# Patient Record
Sex: MALE | Race: WHITE | NOT HISPANIC OR LATINO | Employment: FULL TIME | URBAN - NONMETROPOLITAN AREA
[De-identification: names, ages, dates, MRNs, and addresses within clinical notes are randomized per-mention and may not be internally consistent; named-entity substitution may affect disease eponyms.]

---

## 2020-07-07 RX ORDER — TORSEMIDE 10 MG/1
TABLET ORAL
COMMUNITY
Start: 2019-01-24 | End: 2022-05-06 | Stop reason: SDUPTHER

## 2020-07-07 RX ORDER — ROSUVASTATIN CALCIUM 40 MG/1
TABLET, COATED ORAL
COMMUNITY
Start: 2019-04-10

## 2020-07-07 RX ORDER — ICOSAPENT ETHYL 1000 MG/1
2 CAPSULE ORAL 2 TIMES DAILY
COMMUNITY
End: 2020-08-12 | Stop reason: CLARIF

## 2020-07-07 RX ORDER — UBIDECARENONE 100 MG
100 CAPSULE ORAL DAILY
COMMUNITY

## 2020-07-07 RX ORDER — EZETIMIBE 10 MG/1
10 TABLET ORAL
COMMUNITY

## 2020-07-07 RX ORDER — ALLOPURINOL 300 MG/1
TABLET ORAL
COMMUNITY
Start: 2019-08-14

## 2020-07-07 RX ORDER — LANSOPRAZOLE 30 MG/1
CAPSULE, DELAYED RELEASE ORAL
COMMUNITY
Start: 2019-09-03

## 2020-07-07 RX ORDER — ASPIRIN 81 MG/1
81 TABLET ORAL
COMMUNITY
End: 2022-07-20

## 2020-07-07 RX ORDER — METOPROLOL TARTRATE 100 MG/1
TABLET ORAL
COMMUNITY
Start: 2019-09-06

## 2020-07-07 RX ORDER — AMLODIPINE BESYLATE 2.5 MG/1
TABLET ORAL
COMMUNITY
Start: 2019-09-04 | End: 2021-02-12 | Stop reason: DRUGHIGH

## 2020-07-07 RX ORDER — FLUTICASONE PROPIONATE 50 MCG
2 SPRAY, SUSPENSION (ML) NASAL EVERY 12 HOURS PRN
COMMUNITY

## 2020-07-07 RX ORDER — BENAZEPRIL HYDROCHLORIDE 20 MG/1
TABLET ORAL
COMMUNITY
Start: 2019-09-17 | End: 2020-07-10 | Stop reason: HOSPADM

## 2020-07-07 RX ORDER — FENOFIBRATE 160 MG/1
TABLET ORAL
COMMUNITY
Start: 2019-06-26 | End: 2020-07-10 | Stop reason: HOSPADM

## 2020-07-09 ENCOUNTER — HOSPITAL ENCOUNTER (INPATIENT)
Facility: HOSPITAL | Age: 65
LOS: 1 days | Discharge: HOME | DRG: 916 | End: 2020-07-10
Attending: EMERGENCY MEDICINE | Admitting: STUDENT IN AN ORGANIZED HEALTH CARE EDUCATION/TRAINING PROGRAM
Payer: COMMERCIAL

## 2020-07-09 DIAGNOSIS — E87.6 HYPOKALEMIA: ICD-10-CM

## 2020-07-09 DIAGNOSIS — N18.30 CHRONIC KIDNEY DISEASE (CKD), STAGE III (MODERATE) (HCC): ICD-10-CM

## 2020-07-09 DIAGNOSIS — T78.3XXA ANGIOEDEMA, INITIAL ENCOUNTER: Primary | ICD-10-CM

## 2020-07-09 DIAGNOSIS — I10 ESSENTIAL HYPERTENSION: ICD-10-CM

## 2020-07-09 DIAGNOSIS — E83.42 HYPOMAGNESEMIA: ICD-10-CM

## 2020-07-09 LAB
ANION GAP SERPL CALCULATED.3IONS-SCNC: 12 MMOL/L (ref 4–13)
BASOPHILS # BLD AUTO: 0.06 THOUSANDS/ΜL (ref 0–0.1)
BASOPHILS NFR BLD AUTO: 0 % (ref 0–1)
BUN SERPL-MCNC: 26 MG/DL (ref 5–25)
CALCIUM SERPL-MCNC: 7.4 MG/DL (ref 8.3–10.1)
CHLORIDE SERPL-SCNC: 99 MMOL/L (ref 100–108)
CO2 SERPL-SCNC: 26 MMOL/L (ref 21–32)
CREAT SERPL-MCNC: 2.88 MG/DL (ref 0.6–1.3)
EOSINOPHIL # BLD AUTO: 0.43 THOUSAND/ΜL (ref 0–0.61)
EOSINOPHIL NFR BLD AUTO: 3 % (ref 0–6)
ERYTHROCYTE [DISTWIDTH] IN BLOOD BY AUTOMATED COUNT: 13.9 % (ref 11.6–15.1)
GFR SERPL CREATININE-BSD FRML MDRD: 22 ML/MIN/1.73SQ M
GLUCOSE SERPL-MCNC: 219 MG/DL (ref 65–140)
HCT VFR BLD AUTO: 35.5 % (ref 36.5–49.3)
HGB BLD-MCNC: 12.2 G/DL (ref 12–17)
IMM GRANULOCYTES # BLD AUTO: 0.06 THOUSAND/UL (ref 0–0.2)
IMM GRANULOCYTES NFR BLD AUTO: 0 % (ref 0–2)
LYMPHOCYTES # BLD AUTO: 2.99 THOUSANDS/ΜL (ref 0.6–4.47)
LYMPHOCYTES NFR BLD AUTO: 22 % (ref 14–44)
MCH RBC QN AUTO: 31.2 PG (ref 26.8–34.3)
MCHC RBC AUTO-ENTMCNC: 34.4 G/DL (ref 31.4–37.4)
MCV RBC AUTO: 91 FL (ref 82–98)
MONOCYTES # BLD AUTO: 0.74 THOUSAND/ΜL (ref 0.17–1.22)
MONOCYTES NFR BLD AUTO: 5 % (ref 4–12)
NEUTROPHILS # BLD AUTO: 9.57 THOUSANDS/ΜL (ref 1.85–7.62)
NEUTS SEG NFR BLD AUTO: 70 % (ref 43–75)
NRBC BLD AUTO-RTO: 0 /100 WBCS
PLATELET # BLD AUTO: 366 THOUSANDS/UL (ref 149–390)
PMV BLD AUTO: 9.8 FL (ref 8.9–12.7)
POTASSIUM SERPL-SCNC: 2.9 MMOL/L (ref 3.5–5.3)
RBC # BLD AUTO: 3.91 MILLION/UL (ref 3.88–5.62)
SODIUM SERPL-SCNC: 137 MMOL/L (ref 136–145)
TROPONIN I SERPL-MCNC: <0.02 NG/ML
WBC # BLD AUTO: 13.85 THOUSAND/UL (ref 4.31–10.16)

## 2020-07-09 PROCEDURE — 93005 ELECTROCARDIOGRAM TRACING: CPT

## 2020-07-09 PROCEDURE — 85025 COMPLETE CBC W/AUTO DIFF WBC: CPT | Performed by: EMERGENCY MEDICINE

## 2020-07-09 PROCEDURE — 96365 THER/PROPH/DIAG IV INF INIT: CPT

## 2020-07-09 PROCEDURE — 84484 ASSAY OF TROPONIN QUANT: CPT | Performed by: EMERGENCY MEDICINE

## 2020-07-09 PROCEDURE — 99285 EMERGENCY DEPT VISIT HI MDM: CPT | Performed by: EMERGENCY MEDICINE

## 2020-07-09 PROCEDURE — 36415 COLL VENOUS BLD VENIPUNCTURE: CPT | Performed by: EMERGENCY MEDICINE

## 2020-07-09 PROCEDURE — 80048 BASIC METABOLIC PNL TOTAL CA: CPT | Performed by: EMERGENCY MEDICINE

## 2020-07-09 PROCEDURE — 83735 ASSAY OF MAGNESIUM: CPT | Performed by: PHYSICIAN ASSISTANT

## 2020-07-09 PROCEDURE — 96375 TX/PRO/DX INJ NEW DRUG ADDON: CPT

## 2020-07-09 PROCEDURE — 99285 EMERGENCY DEPT VISIT HI MDM: CPT

## 2020-07-09 RX ORDER — DEXAMETHASONE SODIUM PHOSPHATE 10 MG/ML
10 INJECTION, SOLUTION INTRAMUSCULAR; INTRAVENOUS ONCE
Status: COMPLETED | OUTPATIENT
Start: 2020-07-09 | End: 2020-07-09

## 2020-07-09 RX ORDER — HEPARIN SODIUM 5000 [USP'U]/ML
5000 INJECTION, SOLUTION INTRAVENOUS; SUBCUTANEOUS EVERY 8 HOURS SCHEDULED
Status: DISCONTINUED | OUTPATIENT
Start: 2020-07-09 | End: 2020-07-10 | Stop reason: HOSPADM

## 2020-07-09 RX ORDER — POTASSIUM CHLORIDE 14.9 MG/ML
20 INJECTION INTRAVENOUS
Status: COMPLETED | OUTPATIENT
Start: 2020-07-10 | End: 2020-07-10

## 2020-07-09 RX ORDER — POTASSIUM CHLORIDE 29.8 MG/ML
40 INJECTION INTRAVENOUS ONCE
Status: DISCONTINUED | OUTPATIENT
Start: 2020-07-09 | End: 2020-07-09 | Stop reason: RX

## 2020-07-09 RX ORDER — SODIUM CHLORIDE, SODIUM GLUCONATE, SODIUM ACETATE, POTASSIUM CHLORIDE, MAGNESIUM CHLORIDE, SODIUM PHOSPHATE, DIBASIC, AND POTASSIUM PHOSPHATE .53; .5; .37; .037; .03; .012; .00082 G/100ML; G/100ML; G/100ML; G/100ML; G/100ML; G/100ML; G/100ML
75 INJECTION, SOLUTION INTRAVENOUS CONTINUOUS
Status: DISCONTINUED | OUTPATIENT
Start: 2020-07-10 | End: 2020-07-10

## 2020-07-09 RX ADMIN — SODIUM CHLORIDE, SODIUM LACTATE, POTASSIUM CHLORIDE, AND CALCIUM CHLORIDE 1000 ML: .6; .31; .03; .02 INJECTION, SOLUTION INTRAVENOUS at 22:19

## 2020-07-09 RX ADMIN — DEXAMETHASONE SODIUM PHOSPHATE 10 MG: 10 INJECTION, SOLUTION INTRAMUSCULAR; INTRAVENOUS at 22:19

## 2020-07-09 RX ADMIN — FAMOTIDINE 40 MG: 10 INJECTION, SOLUTION INTRAVENOUS at 22:19

## 2020-07-10 ENCOUNTER — APPOINTMENT (INPATIENT)
Dept: RADIOLOGY | Facility: HOSPITAL | Age: 65
DRG: 916 | End: 2020-07-10
Payer: COMMERCIAL

## 2020-07-10 VITALS
SYSTOLIC BLOOD PRESSURE: 91 MMHG | DIASTOLIC BLOOD PRESSURE: 53 MMHG | HEART RATE: 69 BPM | WEIGHT: 256.39 LBS | OXYGEN SATURATION: 96 % | HEIGHT: 75 IN | TEMPERATURE: 97.8 F | BODY MASS INDEX: 31.88 KG/M2 | RESPIRATION RATE: 20 BRPM

## 2020-07-10 PROBLEM — K20.90 ESOPHAGITIS: Status: ACTIVE | Noted: 2020-07-10

## 2020-07-10 PROBLEM — R73.9 HYPERGLYCEMIA: Status: ACTIVE | Noted: 2020-07-10

## 2020-07-10 PROBLEM — I25.2 H/O ACUTE MYOCARDIAL INFARCTION: Status: ACTIVE | Noted: 2020-07-10

## 2020-07-10 PROBLEM — T78.3XXA ANGIO-EDEMA, INITIAL ENCOUNTER: Status: ACTIVE | Noted: 2020-07-10

## 2020-07-10 PROBLEM — N18.30 CHRONIC KIDNEY DISEASE (CKD), STAGE III (MODERATE) (HCC): Status: ACTIVE | Noted: 2020-07-10

## 2020-07-10 PROBLEM — E78.5 HYPERLIPIDEMIA: Status: ACTIVE | Noted: 2020-07-10

## 2020-07-10 PROBLEM — R63.4 WEIGHT LOSS, NON-INTENTIONAL: Status: ACTIVE | Noted: 2020-07-10

## 2020-07-10 PROBLEM — N17.9 ACUTE KIDNEY INJURY (HCC): Status: ACTIVE | Noted: 2020-07-10

## 2020-07-10 PROBLEM — I10 HTN (HYPERTENSION): Status: ACTIVE | Noted: 2020-07-10

## 2020-07-10 PROBLEM — I25.10 CAD (CORONARY ARTERY DISEASE): Status: ACTIVE | Noted: 2020-07-10

## 2020-07-10 PROBLEM — G47.30 SLEEP APNEA: Status: ACTIVE | Noted: 2020-07-10

## 2020-07-10 PROBLEM — E83.42 HYPOMAGNESEMIA: Status: ACTIVE | Noted: 2020-07-10

## 2020-07-10 PROBLEM — E87.6 HYPOKALEMIA: Status: ACTIVE | Noted: 2020-07-10

## 2020-07-10 LAB
ALBUMIN SERPL BCP-MCNC: 2.9 G/DL (ref 3.5–5)
ALP SERPL-CCNC: 104 U/L (ref 46–116)
ALT SERPL W P-5'-P-CCNC: 45 U/L (ref 12–78)
ANION GAP SERPL CALCULATED.3IONS-SCNC: 12 MMOL/L (ref 4–13)
ANION GAP SERPL CALCULATED.3IONS-SCNC: 8 MMOL/L (ref 4–13)
AST SERPL W P-5'-P-CCNC: 56 U/L (ref 5–45)
BASOPHILS # BLD AUTO: 0.02 THOUSANDS/ΜL (ref 0–0.1)
BASOPHILS NFR BLD AUTO: 0 % (ref 0–1)
BILIRUB DIRECT SERPL-MCNC: 0.2 MG/DL (ref 0–0.2)
BILIRUB SERPL-MCNC: 0.38 MG/DL (ref 0.2–1)
BUN SERPL-MCNC: 21 MG/DL (ref 5–25)
BUN SERPL-MCNC: 22 MG/DL (ref 5–25)
CALCIUM SERPL-MCNC: 8 MG/DL (ref 8.3–10.1)
CALCIUM SERPL-MCNC: 8.3 MG/DL (ref 8.3–10.1)
CHLORIDE SERPL-SCNC: 102 MMOL/L (ref 100–108)
CHLORIDE SERPL-SCNC: 102 MMOL/L (ref 100–108)
CO2 SERPL-SCNC: 24 MMOL/L (ref 21–32)
CO2 SERPL-SCNC: 26 MMOL/L (ref 21–32)
CREAT SERPL-MCNC: 2 MG/DL (ref 0.6–1.3)
CREAT SERPL-MCNC: 2.33 MG/DL (ref 0.6–1.3)
EOSINOPHIL # BLD AUTO: 0 THOUSAND/ΜL (ref 0–0.61)
EOSINOPHIL NFR BLD AUTO: 0 % (ref 0–6)
ERYTHROCYTE [DISTWIDTH] IN BLOOD BY AUTOMATED COUNT: 13.5 % (ref 11.6–15.1)
GFR SERPL CREATININE-BSD FRML MDRD: 29 ML/MIN/1.73SQ M
GFR SERPL CREATININE-BSD FRML MDRD: 34 ML/MIN/1.73SQ M
GLUCOSE SERPL-MCNC: 132 MG/DL (ref 65–140)
GLUCOSE SERPL-MCNC: 151 MG/DL (ref 65–140)
GLUCOSE SERPL-MCNC: 153 MG/DL (ref 65–140)
GLUCOSE SERPL-MCNC: 162 MG/DL (ref 65–140)
HCT VFR BLD AUTO: 35.8 % (ref 36.5–49.3)
HGB BLD-MCNC: 12.1 G/DL (ref 12–17)
IMM GRANULOCYTES # BLD AUTO: 0.04 THOUSAND/UL (ref 0–0.2)
IMM GRANULOCYTES NFR BLD AUTO: 1 % (ref 0–2)
LYMPHOCYTES # BLD AUTO: 0.76 THOUSANDS/ΜL (ref 0.6–4.47)
LYMPHOCYTES NFR BLD AUTO: 11 % (ref 14–44)
MAGNESIUM SERPL-MCNC: 0.4 MG/DL (ref 1.6–2.6)
MAGNESIUM SERPL-MCNC: 1.7 MG/DL (ref 1.6–2.6)
MAGNESIUM SERPL-MCNC: 2.8 MG/DL (ref 1.6–2.6)
MCH RBC QN AUTO: 30.8 PG (ref 26.8–34.3)
MCHC RBC AUTO-ENTMCNC: 33.8 G/DL (ref 31.4–37.4)
MCV RBC AUTO: 91 FL (ref 82–98)
MONOCYTES # BLD AUTO: 0.04 THOUSAND/ΜL (ref 0.17–1.22)
MONOCYTES NFR BLD AUTO: 1 % (ref 4–12)
NEUTROPHILS # BLD AUTO: 6.2 THOUSANDS/ΜL (ref 1.85–7.62)
NEUTS SEG NFR BLD AUTO: 87 % (ref 43–75)
NRBC BLD AUTO-RTO: 0 /100 WBCS
PLATELET # BLD AUTO: 313 THOUSANDS/UL (ref 149–390)
PMV BLD AUTO: 9.9 FL (ref 8.9–12.7)
POTASSIUM SERPL-SCNC: 3.9 MMOL/L (ref 3.5–5.3)
POTASSIUM SERPL-SCNC: 4.1 MMOL/L (ref 3.5–5.3)
PROT SERPL-MCNC: 7.2 G/DL (ref 6.4–8.2)
RBC # BLD AUTO: 3.93 MILLION/UL (ref 3.88–5.62)
SODIUM SERPL-SCNC: 136 MMOL/L (ref 136–145)
SODIUM SERPL-SCNC: 138 MMOL/L (ref 136–145)
WBC # BLD AUTO: 7.06 THOUSAND/UL (ref 4.31–10.16)

## 2020-07-10 PROCEDURE — 80076 HEPATIC FUNCTION PANEL: CPT | Performed by: PHYSICIAN ASSISTANT

## 2020-07-10 PROCEDURE — 99234 HOSP IP/OBS SM DT SF/LOW 45: CPT | Performed by: STUDENT IN AN ORGANIZED HEALTH CARE EDUCATION/TRAINING PROGRAM

## 2020-07-10 PROCEDURE — 83735 ASSAY OF MAGNESIUM: CPT | Performed by: PHYSICIAN ASSISTANT

## 2020-07-10 PROCEDURE — 82948 REAGENT STRIP/BLOOD GLUCOSE: CPT

## 2020-07-10 PROCEDURE — 80048 BASIC METABOLIC PNL TOTAL CA: CPT | Performed by: PHYSICIAN ASSISTANT

## 2020-07-10 PROCEDURE — 71045 X-RAY EXAM CHEST 1 VIEW: CPT

## 2020-07-10 PROCEDURE — 85025 COMPLETE CBC W/AUTO DIFF WBC: CPT | Performed by: PHYSICIAN ASSISTANT

## 2020-07-10 RX ORDER — MAGNESIUM SULFATE HEPTAHYDRATE 40 MG/ML
2 INJECTION, SOLUTION INTRAVENOUS ONCE
Status: COMPLETED | OUTPATIENT
Start: 2020-07-10 | End: 2020-07-10

## 2020-07-10 RX ORDER — PANTOPRAZOLE SODIUM 40 MG/1
40 TABLET, DELAYED RELEASE ORAL
Status: DISCONTINUED | OUTPATIENT
Start: 2020-07-10 | End: 2020-07-10 | Stop reason: HOSPADM

## 2020-07-10 RX ORDER — MAGNESIUM SULFATE HEPTAHYDRATE 40 MG/ML
2 INJECTION, SOLUTION INTRAVENOUS
Status: COMPLETED | OUTPATIENT
Start: 2020-07-10 | End: 2020-07-10

## 2020-07-10 RX ORDER — AMLODIPINE BESYLATE 2.5 MG/1
2.5 TABLET ORAL DAILY
Status: DISCONTINUED | OUTPATIENT
Start: 2020-07-10 | End: 2020-07-10 | Stop reason: HOSPADM

## 2020-07-10 RX ORDER — ATORVASTATIN CALCIUM 40 MG/1
80 TABLET, FILM COATED ORAL
Status: DISCONTINUED | OUTPATIENT
Start: 2020-07-10 | End: 2020-07-10 | Stop reason: HOSPADM

## 2020-07-10 RX ORDER — METOPROLOL TARTRATE 50 MG/1
100 TABLET, FILM COATED ORAL 2 TIMES DAILY
Status: DISCONTINUED | OUTPATIENT
Start: 2020-07-10 | End: 2020-07-10 | Stop reason: HOSPADM

## 2020-07-10 RX ORDER — MAGNESIUM SULFATE HEPTAHYDRATE 40 MG/ML
4 INJECTION, SOLUTION INTRAVENOUS ONCE
Status: DISCONTINUED | OUTPATIENT
Start: 2020-07-10 | End: 2020-07-10 | Stop reason: RX

## 2020-07-10 RX ORDER — TORSEMIDE 10 MG/1
10 TABLET ORAL DAILY
Status: DISCONTINUED | OUTPATIENT
Start: 2020-07-10 | End: 2020-07-10

## 2020-07-10 RX ORDER — NICOTINE 21 MG/24HR
14 PATCH, TRANSDERMAL 24 HOURS TRANSDERMAL DAILY
Status: DISCONTINUED | OUTPATIENT
Start: 2020-07-10 | End: 2020-07-10

## 2020-07-10 RX ORDER — EZETIMIBE 10 MG/1
10 TABLET ORAL
Status: DISCONTINUED | OUTPATIENT
Start: 2020-07-10 | End: 2020-07-10 | Stop reason: HOSPADM

## 2020-07-10 RX ORDER — ALLOPURINOL 100 MG/1
300 TABLET ORAL DAILY
Status: DISCONTINUED | OUTPATIENT
Start: 2020-07-10 | End: 2020-07-10 | Stop reason: HOSPADM

## 2020-07-10 RX ORDER — ASPIRIN 81 MG/1
81 TABLET ORAL DAILY
Status: DISCONTINUED | OUTPATIENT
Start: 2020-07-10 | End: 2020-07-10 | Stop reason: HOSPADM

## 2020-07-10 RX ADMIN — SODIUM CHLORIDE, SODIUM GLUCONATE, SODIUM ACETATE, POTASSIUM CHLORIDE, MAGNESIUM CHLORIDE, SODIUM PHOSPHATE, DIBASIC, AND POTASSIUM PHOSPHATE 75 ML/HR: .53; .5; .37; .037; .03; .012; .00082 INJECTION, SOLUTION INTRAVENOUS at 00:55

## 2020-07-10 RX ADMIN — HEPARIN SODIUM 5000 UNITS: 5000 INJECTION INTRAVENOUS; SUBCUTANEOUS at 05:05

## 2020-07-10 RX ADMIN — ALLOPURINOL 300 MG: 100 TABLET ORAL at 08:34

## 2020-07-10 RX ADMIN — METOPROLOL TARTRATE 100 MG: 50 TABLET, FILM COATED ORAL at 08:34

## 2020-07-10 RX ADMIN — MAGNESIUM SULFATE HEPTAHYDRATE 2 G: 40 INJECTION, SOLUTION INTRAVENOUS at 06:09

## 2020-07-10 RX ADMIN — POTASSIUM CHLORIDE 20 MEQ: 14.9 INJECTION, SOLUTION INTRAVENOUS at 01:03

## 2020-07-10 RX ADMIN — AMLODIPINE BESYLATE 2.5 MG: 2.5 TABLET ORAL at 08:34

## 2020-07-10 RX ADMIN — POTASSIUM CHLORIDE 20 MEQ: 14.9 INJECTION, SOLUTION INTRAVENOUS at 02:39

## 2020-07-10 RX ADMIN — MAGNESIUM SULFATE HEPTAHYDRATE 2 G: 40 INJECTION, SOLUTION INTRAVENOUS at 02:39

## 2020-07-10 RX ADMIN — INSULIN LISPRO 1 UNITS: 100 INJECTION, SOLUTION INTRAVENOUS; SUBCUTANEOUS at 08:50

## 2020-07-10 RX ADMIN — PANTOPRAZOLE SODIUM 40 MG: 40 TABLET, DELAYED RELEASE ORAL at 05:05

## 2020-07-10 RX ADMIN — MAGNESIUM SULFATE HEPTAHYDRATE 2 G: 40 INJECTION, SOLUTION INTRAVENOUS at 00:57

## 2020-07-10 RX ADMIN — INSULIN LISPRO 1 UNITS: 100 INJECTION, SOLUTION INTRAVENOUS; SUBCUTANEOUS at 12:31

## 2020-07-10 NOTE — ASSESSMENT & PLAN NOTE
· LFTs ordered for morning draw  · Continue statin and zetia  · Fenofibrate held with elevated creatinine

## 2020-07-10 NOTE — ASSESSMENT & PLAN NOTE
· Patient denies having DM  · Last HA1C 5 9 with PCP  · Check HA1C with morning labs  · Glucose 219 on admission  · Sliding scale ordered

## 2020-07-10 NOTE — ASSESSMENT & PLAN NOTE
· Resolved since treatment in ED  · Takes Benazepril, as home medication  Discontinue and add to allergy list  · Follow up outpatient with Cardiologist or PCP for blood pressure management

## 2020-07-10 NOTE — ASSESSMENT & PLAN NOTE
· Takes Norvasc, Lopressor and benazepril as home medications for BP control  · Benazepril stopped at this time due to angioedema  ACE inhibitors known to cause angioedema  · Will Consult Cardiology for input on medication change and discontinuation of benazepril

## 2020-07-10 NOTE — H&P
H&P- Brent Peñaloza 1955, 63 y o  male MRN: [id number]    Unit/Bed#: -01 Encounter: [id number]    Primary Care Provider: Oliver Mcintosh DO   Date and time admitted to hospital: 7/9/2020 10:00 PM        Hypomagnesemia  Assessment & Plan  · Magnesium 0 04 on initial labs  · Magnesium 4 g IV ordered stat  · Re-check with AM labs  * Angio-edema, initial encounter  Assessment & Plan  · Improving since treatment in ED  · Takes ARB, Benazepril, as home medication  Will hold at this time  · Placed on Benazepril recently by Cardiologist   Will consult Cardiology for input with medication change  · Monitor airway  Medications as needed if symptoms worsen  Hyperglycemia  Assessment & Plan  · Patient denies having DM  · Last HA1C 5 9 with PCP  · Check HA1C with morning labs  · Glucose 219 on admission  · Sliding scale ordered  Acute kidney injury [facility])  Assessment & Plan  · Creatinine 1 91 on 07/09/2020 on labs with PCP  · On admission, 2 88     · May be due to dehydration  Fluid resuscitation with Isolyte 75 mL/hr for 4 hours  · Encourage oral intake of fluids  · Re-check with AM labs  · Hold diuretic for now and avoid nephrotoxic agents  · Consider Nephrology consult  Hypokalemia  Assessment & Plan  · 2 9 on admission  · Potassium chloride 40 meq IV administered  · Re-check with morning labs  · No EKG changes noted  HTN (hypertension)  Assessment & Plan  · Takes Norvasc, Lopressor and benazepril as home medications for BP control  · Benazepril stopped at this time due to angioedema  ACE inhibitors known to cause angioedema  · Will Consult Cardiology for input on medication change and discontinuation of benazepril  Chronic kidney disease (CKD), stage III (moderate) ([facility])  Assessment & Plan  · 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  · [id number] [facility] one time many years ago     · Baseline creatinine appears to be 1 5-1 8 with review from Beau  · Labs drawn on 7/6, creatinine was 1 9    2 88 on admission  H/O acute myocardial infarction  Assessment & Plan  · MI and heart catheterization in 2004  · No chest pain reported at this time and no EKG changes  · Troponin < 0 02  CAD (coronary artery disease)  Assessment & Plan  · Takes aspirin 81 mg daily  Will continue  · H/o MI and cardiac catheterization in 2004  Esophagitis  Assessment & Plan  · Takes Prevacid at home  · Ordered as Protonix while inpatient  Hyperlipidemia  Assessment & Plan  · LFTs ordered for morning draw  · Continue statin and zetia  · Fenofibrate held with elevated creatinine  Sleep apnea  Assessment & Plan  · Uses CPAP at home  · Encouraged to have wife bring in home unit  · Does not want CPAP on tonight  If he becomes hypoxic while sleeping, will order tonight  Weight loss, non-intentional  Assessment & Plan  · Patient reported during exam of losing almost 20 lbs  Has gone from 271 lbs to his admission weight of 255 in less than a month  · Patient did state that he is eating less due to heat  Doesn't feel like eating  · He is encouraged to monitor weight and follow up with PCP for possible workup if weight loss continues  -------------------------------------------------------------------------------------------------------------  Chief Complaint: tongue and facial swelling  History of Present Illness   HX and PE limited by: None    Yessy Valladares is a pleasant 63 y o  male who presents with swelling of the face and tongue  It began on the evening of 7/9 after he ate dinner  He immediately called EMS  He has not had this issue before  Treatment in the ED with Benadryl, steroids and epi helped  Patient states facial swelling has gone away and his tongue feels mildly swollen  He is having no trouble breathing at this time       Of note, initial labs show hypokalemia, 2 9, and hypomagnesemia, 0 4   Repletion electrolytes ordered  He endorses un-intentional weight loss, from 271 to 255  He stated that he has not been eating much due to the heat and has not been drinking enough fluids  He denies SOB, headache, abdominal pain, chest pain, nausea, vomiting, diarrhea and fevers  It was noted that he takes Benazepril  Will discontinue for now and consult Cardiology for medication change  History obtained from chart review and the patient   -------------------------------------------------------------------------------------------------------------  Dispo: Admit to Stepdown Level 2    Code Status: Level 1 - Full Code  --------------------------------------------------------------------------------------------------------------  Review of Systems    A 12-point, complete review of systems was reviewed and negative except as stated above     Physical Exam   Constitutional: He is oriented to person, place, and time  He appears well-developed and well-nourished  No distress  HENT:   Head: Normocephalic and atraumatic  Right Ear: External ear normal    Left Ear: External ear normal    Nose: Nose normal    Mouth/Throat: Uvula is midline, oropharynx is clear and moist and mucous membranes are normal  No tonsillar exudate  Tongue noted to have mild edema on left side  Eyes: Pupils are equal, round, and reactive to light  Conjunctivae and EOM are normal  Right eye exhibits no discharge  Left eye exhibits no discharge  No scleral icterus  Neck: Normal range of motion  Neck supple  No JVD present  No tracheal deviation present  Cardiovascular: Normal rate, regular rhythm and intact distal pulses  Exam reveals no gallop and no friction rub  Pulmonary/Chest: Effort normal and breath sounds normal  No stridor  No respiratory distress  He has no wheezes  He has no rales  He exhibits no tenderness  Abdominal: Soft  Bowel sounds are normal  He exhibits no distension and no mass   There is no tenderness  There is no rebound and no guarding  Musculoskeletal: Normal range of motion  He exhibits no edema, tenderness or deformity  Neurological: He is alert and oriented to person, place, and time  No cranial nerve deficit or sensory deficit  He exhibits normal muscle tone  Coordination normal    Skin: Skin is warm and dry  Capillary refill takes less than 2 seconds  No rash noted  He is not diaphoretic  No erythema  No pallor  --------------------------------------------------------------------------------------------------------------  Vitals:   Vitals:    07/09/20 2245 07/09/20 2300 07/09/20 2330 07/10/20 0000   BP: 134/60 141/65 133/63 126/67   BP Location: Right arm Right arm Right arm Right arm   Pulse: 83 83 68 65   Resp: (!) 24 (!) 31 22 18   Temp:    98 3 °F (36 8 °C)   TempSrc:    Oral   SpO2: 94% 93% 93% 97%   Weight:    116 kg (256 lb 6 3 oz)   Height:    6' 3" (1 905 m)     Temp  Min: 98 3 °F (36 8 °C)  Max: 98 3 °F (36 8 °C)  IBW: 84 5 kg  Height: 6' 3" (190 5 cm)  Body mass index is 32 05 kg/m²  Laboratory and Diagnostics:  Results from last 7 days   Lab Units 07/09/20  2214   WBC Thousand/uL 13 85*   HEMOGLOBIN g/dL 12 2   HEMATOCRIT % 35 5*   PLATELETS Thousands/uL 366   NEUTROS PCT % 70   MONOS PCT % 5     Results from last 7 days   Lab Units 07/09/20  2214   SODIUM mmol/L 137   POTASSIUM mmol/L 2 9*   CHLORIDE mmol/L 99*   CO2 mmol/L 26   ANION GAP mmol/L 12   BUN mg/dL 26*   CREATININE mg/dL 2 88*   CALCIUM mg/dL 7 4*   GLUCOSE RANDOM mg/dL 219*     Results from last 7 days   Lab Units 07/09/20  2214   MAGNESIUM mg/dL 0 4*           Results from last 7 days   Lab Units 07/09/20  2214   TROPONIN I ng/mL <0 02         ABG:    VBG:          Micro:        EKG: NSR on telemetry  Imaging: There were no images to review         Historical Information   Past Medical History:   Diagnosis Date    CAD (coronary artery disease)     Chronic kidney disease     [facility]     Coronary artery disease     Esophagitis     H/O heart artery stent     Hyperlipidemia     Hypertension     [facility] infarction) [facility]     LUCIO (obstructive sleep apnea)     Sleep apnea      Past Surgical History:   Procedure Laterality Date    CARDIAC CATHETERIZATION      CARDIAC SURGERY      HERNIA REPAIR      ROTATOR CUFF REPAIR       Social History   Social History     Substance and Sexual Activity   Alcohol Use Yes    Frequency: Monthly or less     Social History     Substance and Sexual Activity   Drug Use Never     Social History     Tobacco Use   Smoking Status Former Smoker    Packs/day: 0 50    Types: Cigarettes   Smokeless Tobacco Never Used     Exercise History: Performs independent ADLs  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     Family History:   Family History   Problem Relation Age of Onset    Hypertension Mother     Arthritis Mother     Hypertension Father     Hyperlipidemia Father     Hypertension Sister     Hyperlipidemia Sister     Hyperlipidemia Brother     Diabetes Paternal Grandmother      I have reviewed this patient's family history and commented on sigificant items within the HPI      Medications:  Current Facility-Administered Medications   Medication Dose Route Frequency    allopurinol (ZYLOPRIM) tablet 300 mg  300 mg Oral Daily    amLODIPine (NORVASC) tablet 2 5 mg  2 5 mg Oral Daily    aspirin (ECOTRIN LOW STRENGTH) EC tablet 81 mg  81 mg Oral Daily    atorvastatin (LIPITOR) tablet 80 mg  80 mg Oral Daily With Dinner    ezetimibe (ZETIA) tablet 10 mg  10 mg Oral HS    heparin (porcine) subcutaneous injection 5,000 Units  5,000 Units Subcutaneous Q8H [address]    insulin lispro (HumaLOG) 100 units/mL subcutaneous injection 1-5 Units  1-5 Units Subcutaneous TID AC    magnesium sulfate 2 g/50 mL IVPB (premix) 2 g  2 g Intravenous Q2H    metoprolol tartrate (LOPRESSOR) tablet 100 mg  100 mg Oral BID    multi-electrolyte (PLASMALYTE-A/ISOLYTE-S PH 7 4) IV solution  75 mL/hr Intravenous Continuous    nicotine (NICODERM CQ) 14 mg/24hr TD 24 hr patch 14 mg  14 mg Transdermal Daily    pantoprazole (PROTONIX) EC tablet 40 mg  40 mg Oral Early Morning    potassium chloride 20 mEq IVPB (premix)  20 mEq Intravenous Q2H     Home medications:  Prior to Admission Medications   Prescriptions Last Dose Informant Patient Reported? Taking? Icosapent Ethyl 1 g CAPS 2020 at Unknown time  Yes Yes   Sig: Take 2 g by mouth 2 (two) times a day    allopurinol (ZYLOPRIM) 300 mg tablet 2020 at Unknown time  Yes Yes   Sig: TAKE (1) TABLET BY MOUTH ONCE DAILY  amLODIPine (NORVASC) 2 5 mg tablet 2020 at Unknown time  Yes Yes   Sig: TAKE (1) TABLET BY MOUTH DAILY IN THE MORNING   aspirin (ECOTRIN LOW STRENGTH) 81 mg EC tablet 2020 at Unknown time  Yes Yes   Sig: Take 81 mg by mouth daily after dinner Takes at 1800   benazepril (LOTENSIN) 20 mg tablet 2020 at Unknown time  Yes Yes   Sig: TAKE (1) TABLET BY MOUTH ONCE DAILY  co-enzyme Q-10 100 mg capsule 2020 at Unknown time  Yes Yes   Sig: Take 100 mg by mouth daily    ezetimibe (ZETIA) 10 mg tablet 2020  Yes No   Sig: Take 10 mg by mouth daily at bedtime    fenofibrate (TRIGLIDE) 160 MG tablet 2020  Yes No   Sig: daily at bedtime    fluticasone (FLONASE) 50 mcg/act nasal spray More than a month at Unknown time Spouse/Significant Other Yes No   Si sprays into each nostril every 12 (twelve) hours as needed (sinus infections)    lansoprazole (PREVACID) 30 mg capsule 2020 at Unknown time  Yes Yes   Sig: TAKE 1 CAPSULE BY MOUTH ONCE DAILY  metoprolol tartrate (LOPRESSOR) 100 mg tablet 2020 at Unknown time  Yes Yes   Sig: TAKE 1 TABLET BY MOUTH TWICE DAILY  rosuvastatin (CRESTOR) 40 MG tablet 2020 at Unknown time  Yes Yes   Sig: TAKE 1 TABLET BY MOUTH ONCE DAILY IN THE EVENING     torsemide (DEMADEX) 10 mg tablet 2020 at Unknown time  Yes Yes   Sig: TAKE (1) TABLET BY MOUTH ONCE DAILY  Facility-Administered Medications: None     Allergies: Allergies   Allergen Reactions    Alprazolam Other (See Comments)     Other reaction(s): Other: Document details in comments  SEVERE DISOREINTATION/CRAZY  SEVERE DISOREINTATION/CRAZY      Buspirone      Other reaction(s): Other (See Comments), Other: Document details in comments  SEVERE DISORIENTATION/CRAZY  SEVERE DISORIENTATION/CRAZY      Lorazepam      Other reaction(s): Other (See Comments), Other: Document details in comments  SEVERE DISORIENTATION/CRAZY  SEVERE DISORIENTATION/CRAZY         ------------------------------------------------------------------------------------------------------------  Advance Directive and Living Will:      Power of :    POLST:    ------------------------------------------------------------------------------------------------------------  Anticipated Length of Stay is > 2 midnights    Care Time Delivered:   No Critical Care time spent       [facility]        Portions of the record may have been created with voice recognition software  Occasional wrong word or "sound a like" substitutions may have occurred due to the inherent limitations of voice recognition software    Read the chart carefully and recognize, using context, where substitutions have occurred

## 2020-07-10 NOTE — ASSESSMENT & PLAN NOTE
· Takes Norvasc, Lopressor and benazepril as home medications for BP control  · Benazepril stopped at this time due to angioedema  ACE inhibitors known to cause angioedema    · Follow up outpatient for further management

## 2020-07-10 NOTE — ASSESSMENT & PLAN NOTE
· Patient denies having DM  · Last HA1C 5 9 with PCP  · Glucose 219 on admission and sliding scale ordered     · Follow up outpatient with PCP

## 2020-07-10 NOTE — UTILIZATION REVIEW
Notification of Inpatient Admission/Inpatient Authorization Request   This is a Notification of Inpatient Admission for [address]  Be advised that this patient was admitted to our facility under Inpatient Status  Contact Celina Echevarria at [phone] for additional admission information  [facility] DEPT  DEDICATED [phone]  Patient Name:   Ashley Perez   YOB: 1955       [address]   [address]:   [address]  Tax ID: [id number]  NPI: [id number] Attending Provider/NPI:  Phone:  Address: Jesusita Ybarra [[id number]]  [phone]  SAME AS FACILITY   Place of Service Code: 24 Place of Service Name:  [address] [place] [address]   Start Date: 7/9/20 2331     Discharge Date & Time: 7/10/2020  1:15 PM    Type of Admission: Inpatient Status Discharge Disposition (if discharged): Home/Self Care   Patient Diagnoses: Allergic reaction [T78 40XA]  Angioedema, initial encounter [T78  3XXA]     Orders: Admission Orders (From admission, onward)     Ordered        07/09/20 2334  Inpatient Admission  Once         07/09/20 2331  Inpatient Admission  Once                    Assigned Utilization Review Contact: Celina Echevarria  Utilization   Network Utilization Review Department  Phone: [phone]; Fax [phone]  Email: [email or web address]   ATTENTION PAYERS: Please call the assigned Utilization  directly with any questions or concerns ALL voicemails in the department are confidential  Send all requests for admission clinical reviews, approved or denied determinations and any other requests to dedicated fax number belonging to the campus where the patient is receiving treatment

## 2020-07-10 NOTE — ED NOTES
Pt  Re-assessed, no change to tongue swelling, no signs of acute resp distress noted, pt  breathing easy on room air, VSS, will continue to monitor     Bernardo Sánchez RN  07/09/20 9180

## 2020-07-10 NOTE — ASSESSMENT & PLAN NOTE
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 Nephrology one time many years ago  · Baseline creatinine appears to be 1 5-1 8 with review from Care Everywhere  · Labs drawn on 7/6, creatinine was 1 9 then 2 88 on admission     · Follow up outpatient

## 2020-07-10 NOTE — PROGRESS NOTES
Chart reviewed  Admit with angio edema  Possible dc today  CM consulted for dc planning:    CM met with patient at the bedside,baseline information  was obtained  CM discussed the role of CM in helping the patient develop a discharge plan and assist the patient in carry out their plan      07/10/20 [facility]   Patient Information   Mental Status Alert   Primary Caregiver Self   Support System Immediate family   Activities of Daily Living Prior to Admission   Functional Status Independent   Assistive Device No device needed   Living Arrangement House;Lives with someone   [place] of Transport to Appts: Drive Self       Patient has identified:his wife Mandy Salgado as her primary   She is able to assist upon discharge  No POA: Pt verbalized Mandy Salgado would be the person assisting with decisions with making if need be  No advance directive: At this time patient is not interested in receiving information  PCP:  Angel Luis Bonilla  Pt has a prescription plan and uses [employer]  Patient is not a [place]:   Pt denies SA/MH history    House set up: ranch home  Functional level PTA: very I/pta, works full time  DME use: cpap crom CPO2  Prior use of Home Care or STR: none  Transportation: does drive  Community Resources: None    CM reviewed d/c planning process including the following: identifying help at home, patient preference for d/c planning needs, availability of treatment team to discuss questions or concerns patient and/or family may have regarding understanding medications and recognizing signs and symptoms once discharged  CM also encouraged patient to follow up with all recommended appointments after discharge  Patient advised of importance for patient and family to participate in managing patients medical well being      Goal of patient upon discharge is to go home     Discussed PCP follow up and at this time patient does not want me to make the appointment he will as patient is a 14 for Unplanned Readmission- advised patient to call and been seen with in 3 business days  No other needs identified  Plan is home with family

## 2020-07-10 NOTE — ASSESSMENT & PLAN NOTE
· MI and heart catheterization in 2004  · No chest pain reported at this time and no EKG changes  · Troponin < 0 02

## 2020-07-10 NOTE — PLAN OF CARE
Pt admitted with angioedema which has improved significantly with steriods, benadryl, and epi in ED  Face symmetry back to WDL  Left side of tongue with minimal swelling noted  Pt's K replaced and new result pending from this am   Mag replaced and was 1 7 this am   Another 2 gm Mag hung this am   Pt's IV site with burning s/p replacements  No redness or cording noted  IV with + flushing and blood return  Pt stated his IV felt better after flushing  Warm compress also applied to left arm  Morning Mag to be infused over 4 hrs instead of 2 hrs per PA  Pt tolerating protocols  Cardiology consulted  BMP lab results pending  Problem: Potential for Falls  Goal: Patient will remain free of falls  Description  INTERVENTIONS:  - Assess patient frequently for physical needs  -  Identify cognitive and physical deficits and behaviors that affect risk of falls  -  [place] fall precautions as indicated by assessment   - Educate patient/family on patient safety including physical limitations  - Instruct patient to call for assistance with activity based on assessment  - Modify environment to reduce risk of injury  - Consider OT/PT consult to assist with strengthening/mobility  Outcome: Progressing     Problem: Nutrition/Hydration-ADULT  Goal: Nutrient/Hydration intake appropriate for improving, restoring or maintaining nutritional needs  Description  Monitor and assess patient's nutrition/hydration status for malnutrition  Collaborate with interdisciplinary team and initiate plan and interventions as ordered  Monitor patient's weight and dietary intake as ordered or per policy  Utilize nutrition screening tool and intervene as necessary  Determine patient's food preferences and provide high-protein, high-caloric foods as appropriate       INTERVENTIONS:  - Monitor oral intake, urinary output, labs, and treatment plans  - Assess nutrition and hydration status and recommend course of action  - Evaluate amount of meals eaten  - Assist patient with eating if necessary   - Allow adequate time for meals  - Recommend/ encourage appropriate diets, oral nutritional supplements, and vitamin/mineral supplements  - Order, calculate, and assess calorie counts as needed  - Recommend, monitor, and adjust tube feedings and TPN/PPN based on assessed needs  - Assess need for intravenous fluids  - Provide specific nutrition/hydration education as appropriate  - Include patient/family/caregiver in decisions related to nutrition  Outcome: Progressing     Problem: CARDIOVASCULAR - ADULT  Goal: Maintains optimal cardiac output and hemodynamic stability  Description  INTERVENTIONS:  - Monitor I/O, vital signs and rhythm  - Monitor for S/S and trends of decreased cardiac output  - Administer and titrate ordered vasoactive medications to optimize hemodynamic stability  - Assess quality of pulses, skin color and temperature  - Assess for signs of decreased coronary artery perfusion  - Instruct patient to report change in severity of symptoms  Outcome: Progressing  Goal: Absence of cardiac dysrhythmias or at baseline rhythm  Description  INTERVENTIONS:  - Continuous cardiac monitoring, vital signs, obtain 12 lead EKG if ordered  - Administer antiarrhythmic and heart rate control medications as ordered  - Monitor electrolytes and administer replacement therapy as ordered  Outcome: Progressing     Problem: GASTROINTESTINAL - ADULT  Goal: Maintains adequate nutritional intake  Description  INTERVENTIONS:  - Monitor percentage of each meal consumed  - Identify factors contributing to decreased intake, treat as appropriate  - Assist with meals as needed  - Monitor I&O, weight, and lab values if indicated  - Obtain nutrition services referral as needed  Outcome: Progressing     Problem: METABOLIC, FLUID AND ELECTROLYTES - ADULT  Goal: Electrolytes maintained within normal limits  Description  INTERVENTIONS:  - Monitor labs and assess patient for signs and symptoms of electrolyte imbalances  - Administer electrolyte replacement as ordered  - Monitor response to electrolyte replacements, including repeat lab results as appropriate  - Instruct patient on fluid and nutrition as appropriate  Outcome: Progressing  Goal: Fluid balance maintained  Description  INTERVENTIONS:  - Monitor labs   - Monitor I/O and WT  - Instruct patient on fluid and nutrition as appropriate  - Assess for signs & symptoms of volume excess or deficit  Outcome: Progressing  Goal: Glucose maintained within target range  Description  INTERVENTIONS:  - Monitor Blood Glucose as ordered  - Assess for signs and symptoms of hyperglycemia and hypoglycemia  - Administer ordered medications to maintain glucose within target range  - Assess nutritional intake and initiate nutrition service referral as needed  Outcome: Progressing     Problem: PAIN - ADULT  Goal: Verbalizes/displays adequate comfort level or baseline comfort level  Description  Interventions:  - Encourage patient to monitor pain and request assistance  - Assess pain using appropriate pain scale  - Administer analgesics based on type and severity of pain and evaluate response  - Implement non-pharmacological measures as appropriate and evaluate response  - Consider cultural and social influences on pain and pain management  - Notify physician/advanced practitioner if interventions unsuccessful or patient reports new pain  Outcome: Progressing     Problem: INFECTION - ADULT  Goal: Absence or prevention of progression during hospitalization  Description  INTERVENTIONS:  - Assess and monitor for signs and symptoms of infection  - Monitor lab/diagnostic results  - Monitor all insertion sites, i e  indwelling lines, tubes, and drains  - Monitor endotracheal if appropriate and nasal secretions for changes in amount and color  - [place] appropriate cooling/warming therapies per order  - Administer medications as ordered  - Instruct and encourage patient and family to use good hand hygiene technique  - Identify and instruct in appropriate isolation precautions for identified infection/condition  Outcome: Progressing     Problem: DISCHARGE PLANNING  Goal: Discharge to home or other facility with appropriate resources  Description  INTERVENTIONS:  - Identify barriers to discharge w/patient and caregiver  - Arrange for needed discharge resources and transportation as appropriate  - Identify discharge learning needs (meds, wound care, etc )  - Arrange for interpretive services to assist at discharge as needed  - Refer to Case Management Department for coordinating discharge planning if the patient needs post-hospital services based on physician/advanced practitioner order or complex needs related to functional status, cognitive ability, or social support system  Outcome: Progressing     Problem: Knowledge Deficit  Goal: Patient/family/caregiver demonstrates understanding of disease process, treatment plan, medications, and discharge instructions  Description  Complete learning assessment and assess knowledge base    Interventions:  - Provide teaching at level of understanding  - Provide teaching via preferred learning methods  Outcome: Progressing

## 2020-07-10 NOTE — DISCHARGE SUMMARY
Discharge- Adi Mora 1955, 63 y o  male MRN: [id number]    Unit/Bed#: -01 Encounter: [id number]    Primary Care Provider: Kraig Pak DO   Date and time admitted to hospital: 7/9/2020 10:00 PM  Hypomagnesemia  Assessment & Plan  · Magnesium 0 04 on initial labs  · Replaced- repeat as outpatient next week and follow up with PCP       * Angio-edema, initial encounter  Assessment & Plan  · Resolved since treatment in ED  · Takes Benazepril, as home medication  Discontinue and add to allergy list  · Follow up outpatient with Cardiologist or PCP for blood pressure management  Hyperglycemia  Assessment & Plan  · Patient denies having DM  · Last HA1C 5 9 with PCP  · Glucose 219 on admission and sliding scale ordered  · Follow up outpatient with PCP    Acute kidney injury [facility])  Assessment & Plan  · Creatinine 1 91 on 07/09/2020 on labs with PCP  · On admission, 2 88  · Possibly due to dehydration  Fluid resuscitation with Isolyte 75 mL/hr for 4 hours  · Encourage oral intake of fluids  · Repeat labs show improvement to 2 today  · Have labs drawn Monday 7/13 and follow up with PCP    Hypokalemia  Assessment & Plan  · 2 9 on admission  · Potassium replaced  · No EKG changes noted  · Follow up outpatient with PCP and have labs drawn Monday 7/13    HTN (hypertension)  Assessment & Plan  · Takes Norvasc, Lopressor and benazepril as home medications for BP control  · Benazepril stopped at this time due to angioedema  ACE inhibitors known to cause angioedema  · Follow up outpatient for further management    Chronic kidney disease (CKD), stage III (moderate) ([facility] [address] )  Assessment & Plan  · 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 [place]  · Baseline creatinine appears to be 1 5-1 8 with review from Care Everywhere  · Labs drawn on 7/6, creatinine was 1 9 then 2 88 on admission     · Follow up outpatient    H/O acute myocardial infarction  Assessment & Plan  · MI and heart catheterization in 2004  · No chest pain reported at this time and no EKG changes  · Troponin < 0 02  CAD (coronary artery disease)  Assessment & Plan  · Takes aspirin 81 mg daily  Will continue  · H/o MI and cardiac catheterization in 2004  Esophagitis  Assessment & Plan  · Takes Prevacid at home  Hyperlipidemia  Assessment & Plan  · LFT's normal    · Continue statin and zetia  · Fenofibrate held with elevated creatinine  Sleep apnea  Assessment & Plan  · Uses CPAP at home  · Encouraged to have wife bring in home unit  · Does not want CPAP on tonight  Weight loss, non-intentional  Assessment & Plan  · Patient reported during exam of losing almost 20 lbs  Has gone from 271 lbs to his admission weight of 255 in less than a month  · Patient did state that he is eating less due to heat  Doesn't feel like eating  · He is encouraged to monitor weight and follow up with PCP for workup of weight loss  Resolved Problems  Date Reviewed: 7/10/2020    None          Admission Date:   Admission Orders (From admission, onward)     Ordered        07/09/20 2334  Inpatient Admission  Once         07/09/20 2331  Inpatient Admission  Once                     Admitting Diagnosis: Allergic reaction [T78 40XA]  Angioedema, initial encounter [T78  3XXA]    HPI: Margi Muse is a pleasant 63 y o  male  with past medical history of hypertension, coronary artery disease, history of MI who presents with swelling of the face and tongue  It began on the evening of 7/9 after he ate dinner  He immediately called EMS  Procedures Performed: No orders of the defined types were placed in this encounter  Summary of Hospital Course: He has not had this issue before  Treatment in the ED with Benadryl, steroids and epi helped  Patient states facial swelling has gone away and his tongue feels mildly swollen    He is having no trouble breathing at the time of admission  He was not eating any new foods  This was determined to likely be angioedema from ACEI        Of note, initial labs show hypokalemia, 2 9, and hypomagnesemia, 0 4  Repletion electrolytes ordered       He endorses un-intentional weight loss, from 271 to 255  He stated that he has not been eating much due to the heat and has not been drinking enough fluids  He denies SOB, headache, abdominal pain, chest pain, nausea, vomiting, diarrhea and fevers          It was noted that he takes Benazepril, which was discontinued  The patient was monitored closely overnight and all symptoms continued to be resolved  His electrolytes were replaced and his initial creatinine was noted to be 2 88  His baseline appears to be around 1 8-1 9  He did receive IV fluid and repeat creatinine prior to discharge was 2 0  All electrolytes were normal upon discharge  Patient will follow-up outpatient with his PCP in 1 week and will have labs drawn on Monday July 13th to include a CBC, BMP, and magnesium  The patient also needs to follow-up with his primary care provider for monitoring his blood sugar and unintentional weight loss  He will follow-up with cardiology for continued management of his hypertension given that he is no longer able to take an ACE-inhibitor  He will continue on his Norvasc and metoprolol  His wife does have an EpiPen at home  She understands as does the patient to call 911 immediately with any signs of shortness of breath or facial swelling  All were in agreement and patient will be discharged today  Significant Findings, Care, Treatment and Services Provided: See above    Complications: None    Condition at Discharge: stable         Discharge instructions/Information to patient and family:   See after visit summary for information provided to patient and family        Provisions for Follow-Up Care:  See after visit summary for information related to follow-up care and any pertinent home health orders  PCP: Jody Cerna DO    Disposition: Home    Planned Readmission: No    Discharge Statement   I spent 30 minutes discharging the patient  This time was spent on the day of discharge  I had direct contact with the patient on the day of discharge  Additional documentation is required if more than 30 minutes were spent on discharge  Discharge Medications:  See after visit summary for reconciled discharge medications provided to patient and family

## 2020-07-10 NOTE — ASSESSMENT & PLAN NOTE
· Patient reported during exam of losing almost 20 lbs  Has gone from 271 lbs to his admission weight of 255 in less than a month  · Patient did state that he is eating less due to heat  Doesn't feel like eating  · He is encouraged to monitor weight and follow up with PCP for workup of weight loss

## 2020-07-10 NOTE — ASSESSMENT & PLAN NOTE
· Patient reported during exam of losing almost 20 lbs  Has gone from 271 lbs to his admission weight of 255 in less than a month  · Patient did state that he is eating less due to heat  Doesn't feel like eating  · He is encouraged to monitor weight and follow up with PCP for possible workup if weight loss continues

## 2020-07-10 NOTE — ASSESSMENT & PLAN NOTE
· Magnesium 0 04 on initial labs     · Replaced- repeat as outpatient next week and follow up with PCP

## 2020-07-10 NOTE — ED NOTES
Re-assessed pt , pt  Resting on liter, easily arousable , slight improvement in facial swelling, no significant change to tongue swelling, pt   Reports feeling like its becoming a bit easier to swallow, slurred speech still noted, VSS, will continue to monitor     Ayo Harrington RN  07/09/20 1465

## 2020-07-10 NOTE — ASSESSMENT & PLAN NOTE
· Improving since treatment in ED  · Takes ARB, Benazepril, as home medication  Will hold at this time  · Placed on Benazepril recently by Cardiologist   Will consult Cardiology for input with medication change  · Monitor airway  Medications as needed if symptoms worsen

## 2020-07-10 NOTE — ASSESSMENT & PLAN NOTE
· Creatinine 1 91 on 07/09/2020 on labs with PCP  · On admission, 2 88  · Possibly due to dehydration  Fluid resuscitation with Isolyte 75 mL/hr for 4 hours  · Encourage oral intake of fluids      · Repeat labs show improvement to 2 today  · Have labs drawn Monday 7/13 and follow up with PCP

## 2020-07-10 NOTE — ASSESSMENT & PLAN NOTE
· 2 9 on admission  · Potassium replaced  · No EKG changes noted     · Follow up outpatient with PCP and have labs drawn Monday 7/13

## 2020-07-10 NOTE — ASSESSMENT & PLAN NOTE
· Uses CPAP at home  · Encouraged to have wife bring in home unit  · Does not want CPAP on tonight  If he becomes hypoxic while sleeping, will order tonight

## 2020-07-10 NOTE — ASSESSMENT & PLAN NOTE
· 2 9 on admission  · Potassium chloride 40 meq IV administered  · Re-check with morning labs  · No EKG changes noted

## 2020-07-10 NOTE — ASSESSMENT & PLAN NOTE
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 one time many years ago  · Baseline creatinine appears to be 1 5-1 8 with review from Care Everywhere  · Labs drawn on 7/6, creatinine was 1 9    2 88 on admission

## 2020-07-10 NOTE — ASSESSMENT & PLAN NOTE
· Creatinine 1 91 on 07/09/2020 on labs with PCP  · On admission, 2 88     · May be due to dehydration  Fluid resuscitation with Isolyte 75 mL/hr for 4 hours  · Encourage oral intake of fluids  · Re-check with AM labs  · Hold diuretic for now and avoid nephrotoxic agents  · Consider Nephrology consult

## 2020-07-10 NOTE — UTILIZATION REVIEW
Initial Clinical Review    Admission: Date/Time/Statement: Admission Orders (From admission, onward)     Ordered        07/09/20 2334  Inpatient Admission  Once         07/09/20 2331  Inpatient Admission  Once                   Orders Placed This Encounter   Procedures    Inpatient Admission     Standing Status:   Standing     Number of Occurrences:   1     Order Specific Question:   Admitting Physician     Answer:   Yahir Rhodes [[id number]]     Order Specific Question:   Level of Care     Answer:   Critical Care [15]     Order Specific Question:   Estimated length of stay     Answer:   Not Applicable    Inpatient Admission     Standing Status:   Standing     Number of Occurrences:   1     Order Specific Question:   Admitting Physician     Answer:   Yahir Rhodes [[id number]]     Order Specific Question:   Level of Care     Answer:   Level 2 Stepdown / HOT [14]     Order Specific Question:   Estimated length of stay     Answer:   Not Applicable     ED Arrival Information     Expected Arrival Acuity Means of Arrival Escorted By Service Admission Type    7/9/2020 21:56 7/9/2020 21:56 Emergent Ambulance Unknown Critical Care/ICU Emergency    Arrival Complaint    allergic reaction        Chief Complaint   Patient presents with    Allergic Reaction     pt  had left sided facial swelling and left sided tounge swelling, denies change in diet or bug bite, slight difficulty swallowing, pt  took 25 benadryl prio to EMS arrival,2113 epi given by BLS, 2125 given addition 25 benadryl IV, 2135 0 3 epi IM given prior to ER arrival, sligjht improvemtn to facial and tounge swelling per EMS, pt  denies pain, no acute resop distress noted, pt  still c/o diffiuclty swallowing, slurred speech with tounge swelling     Assessment/Plan: 64 yo male to ED from home via EMS w/ swelling of face and tongue  Began after eating dinner and called EMS  In ED given benadryl , steroids and epi   Admitted IP status to ICU to monitor    Also noted to have K 2 9 and mg 0 4 both repleted  Also c/o unintentional weight loss  Hold ARB , benazepril and consult cardiology and monitor airway   Recheck lytes in am   ESTELLE creat 2 88 on admission , creat 1 91 on 7/9 , may be d/t dehydration , treat w/ IVF and consider nephrology consult  PE : tongue w/ mild edema on L side     ED Triage Vitals   Temperature Pulse Respirations Blood Pressure SpO2   07/09/20 2201 07/09/20 2201 07/09/20 2201 07/09/20 2201 07/09/20 2201   98 3 °F (36 8 °C) 90 16 156/71 94 %      Temp Source Heart Rate Source Patient Position - Orthostatic VS BP Location FiO2 (%)   07/09/20 2201 07/09/20 2201 07/09/20 2201 07/09/20 2201 --   Temporal Monitor Sitting Right arm       Pain Score       07/10/20 0000       No Pain        Wt Readings from Last 1 Encounters:   07/10/20 116 kg (256 lb 6 3 oz)     Additional Vital Signs:   07/10/20 1050  97 8 °F (36 6 °C)  69  20  91/53  68  96 %  None (Room air)  Lying   07/10/20 0716  97 5 °F (36 4 °C)  60  19  104/52  72  97 %  None (Room air)  Sitting   07/10/20 0700  --  58  14  --  --  94 %  --  --   07/10/20 0600  --  58  19  --  --  93 %  --  --   07/10/20 0500  --  61  22  --  --  97 %  --  --   07/10/20 0400  --  61  22  113/57  77  95 %  --  --   07/10/20 0300  97 5 °F (36 4 °C)  62  24Abnormal   126/59  85  93 %  None (Room air)  Lying   07/10/20 0200  --  62  23Abnormal   131/63  91  95 %  --  --   07/10/20 0100  --  66  23Abnormal   141/65  94  94 %  --  --   07/10/20 0000  98 3 °F (36 8 °C)  65  18  126/67  91  97 %  None (Room air)  Lying   07/09/20 2330  --  68  22  133/63  91  93 %  None (Room air)  Lying   07/09/20 2300  --  83  31Abnormal   141/65  93  93 %  None (Room air)  Sitting   07/09/20 2245  --  83  24Abnormal   134/60  87  94 %  None (Room air)  Sitting   07/09/20 2209  --  --  --  --  --  --  None (Room air)  --       Pertinent Labs/Diagnostic Test Results:   7/10 CXR - No acute cardiopulmonary disease  Results from last 7 days   Lab Units 07/10/20  0505 07/09/20  2214   WBC Thousand/uL 7 06 13 85*   HEMOGLOBIN g/dL 12 1 12 2   HEMATOCRIT % 35 8* 35 5*   PLATELETS Thousands/uL 313 366   NEUTROS ABS Thousands/µL 6 20 9 57*     Results from last 7 days   Lab Units 07/10/20  1159 07/10/20  0530 07/10/20  0505 07/09/20  2214   SODIUM mmol/L 136 138  --  137   POTASSIUM mmol/L 3 9 4 1  --  2 9*   CHLORIDE mmol/L 102 102  --  99*   CO2 mmol/L 26 24  --  26   ANION GAP mmol/L 8 12  --  12   BUN mg/dL 21 22  --  26*   CREATININE mg/dL 2 00* 2 33*  --  2 88*   EGFR ml/min/1 73sq m 34 29  --  22   CALCIUM mg/dL 8 3 8 0*  --  7 4*   MAGNESIUM mg/dL 2 8*  --  1 7 0 4*     Results from last 7 days   Lab Units 07/10/20  0505   AST U/L 56*   ALT U/L 45   ALK PHOS U/L 104   TOTAL PROTEIN g/dL 7 2   ALBUMIN g/dL 2 9*   TOTAL BILIRUBIN mg/dL 0 38   BILIRUBIN DIRECT mg/dL 0 20     Results from last 7 days   Lab Units 07/10/20  1052 07/10/20  0719   POC GLUCOSE mg/dl 153* 151*     Results from last 7 days   Lab Units 07/10/20  1159 07/10/20  0530 07/09/20  2214   GLUCOSE RANDOM mg/dL 132 162* 219*     Results from last 7 days   Lab Units 07/09/20  2214   TROPONIN I ng/mL <0 02     ED Treatment:   Medication Administration from 07/09/2020 2156 to 07/10/2020 0023       Date/Time Order Dose Route Action     07/09/2020 2219 lactated ringers bolus 1,000 mL 1,000 mL Intravenous New Bag     07/09/2020 2219 dexamethasone (PF) (DECADRON) injection 10 mg 10 mg Intravenous Given     07/09/2020 2219 famotidine (PEPCID) injection 40 mg 40 mg Intravenous Given        Past Medical History:   Diagnosis Date    CAD (coronary artery disease)     Chronic kidney disease     [facility]     Coronary artery disease     Esophagitis     H/O heart artery stent     Hyperlipidemia     Hypertension     MI (myocardial infarction) ([address] )     LUCIO (obstructive sleep apnea)     Sleep apnea      Present on Admission:   Hypokalemia   Acute kidney [facility] ([facility]   Angio-edema, initial encounter   Hyperglycemia   Chronic kidney disease (CKD), stage III (moderate) (HCC)   HTN (hypertension)   Hyperlipidemia   Esophagitis   CAD (coronary artery disease)   Sleep apnea   Hypomagnesemia   Weight loss, non-intentional      Admitting Diagnosis: Allergic reaction [T78 40XA]  Angioedema, initial encounter [T78  3XXA]  Age/Sex: 63 y o  male  Admission Orders:  Scheduled Medications:    Medications:  allopurinol 300 mg Oral Daily   amLODIPine 2 5 mg Oral Daily   aspirin 81 mg Oral Daily   atorvastatin 80 mg Oral Daily With Dinner   ezetimibe 10 mg Oral HS   heparin (porcine) 5,000 Units Subcutaneous Q8H [address]   insulin lispro 1-5 Units Subcutaneous TID AC   metoprolol tartrate 100 mg Oral BID   pantoprazole 40 mg Oral Early Morning     Continuous IV Infusions:     PRN Meds:    fingerstick ac and hs   Act as shamar   NPO   Neuro checks  Dysphagia eval     IP CONSULT TO CASE MANAGEMENT    Network Utilization Review Department  [email or web address]  ATTENTION: Please call with any questions or concerns to [phone] and carefully listen to the prompts so that you are directed to the right person  All voicemails are confidential   Brenda Cárdenas all requests for admission clinical reviews, approved or denied determinations and any other requests to dedicated fax number below belonging to the campus where the patient is receiving treatment   List of dedicated fax numbers for the Facilities:  FACILITY NAME UR FAX NUMBER   ADMISSION DENIALS (Administrative/Medical Necessity) [phone]   [address] (Maternity/NICU/Pediatrics) [phone]   Tenisha Baprajni [phone]   Areta Sell [phone]   [address] [place] [address] [phone]   [facility] [address] [facility] [address] [place] [address] [facility] [phone]   [address] [place], [place] [phone]

## 2020-07-13 LAB
ATRIAL RATE: 86 BPM
P AXIS: 60 DEGREES
PR INTERVAL: 140 MS
QRS AXIS: -15 DEGREES
QRSD INTERVAL: 90 MS
QT INTERVAL: 378 MS
QTC INTERVAL: 452 MS
T WAVE AXIS: 82 DEGREES
VENTRICULAR RATE: 86 BPM

## 2020-08-12 ENCOUNTER — OFFICE VISIT (OUTPATIENT)
Dept: CARDIOLOGY CLINIC | Facility: CLINIC | Age: 65
End: 2020-08-12
Payer: COMMERCIAL

## 2020-08-12 VITALS
DIASTOLIC BLOOD PRESSURE: 80 MMHG | HEART RATE: 58 BPM | HEIGHT: 74 IN | BODY MASS INDEX: 34.08 KG/M2 | SYSTOLIC BLOOD PRESSURE: 132 MMHG | TEMPERATURE: 97.2 F | WEIGHT: 265.6 LBS

## 2020-08-12 DIAGNOSIS — R60.0 LOCALIZED EDEMA: ICD-10-CM

## 2020-08-12 DIAGNOSIS — E78.2 MIXED HYPERLIPIDEMIA: ICD-10-CM

## 2020-08-12 DIAGNOSIS — E87.8 ELECTROLYTE ABNORMALITY: ICD-10-CM

## 2020-08-12 DIAGNOSIS — I25.10 CORONARY ARTERY DISEASE INVOLVING NATIVE CORONARY ARTERY OF NATIVE HEART WITHOUT ANGINA PECTORIS: Primary | ICD-10-CM

## 2020-08-12 DIAGNOSIS — G47.33 OBSTRUCTIVE SLEEP APNEA SYNDROME: ICD-10-CM

## 2020-08-12 DIAGNOSIS — Z95.820 S/P ANGIOPLASTY WITH STENT: ICD-10-CM

## 2020-08-12 DIAGNOSIS — I10 ESSENTIAL HYPERTENSION: ICD-10-CM

## 2020-08-12 PROCEDURE — 99244 OFF/OP CNSLTJ NEW/EST MOD 40: CPT | Performed by: INTERNAL MEDICINE

## 2020-08-12 RX ORDER — FENOFIBRATE 160 MG/1
160 TABLET ORAL DAILY
COMMUNITY
End: 2020-08-12 | Stop reason: CLARIF

## 2020-08-12 RX ORDER — BENAZEPRIL HYDROCHLORIDE 20 MG/1
20 TABLET ORAL DAILY
COMMUNITY
End: 2020-08-12 | Stop reason: CLARIF

## 2020-08-12 NOTE — PATIENT INSTRUCTIONS
I would recommend updated blood work sometime this week if possible  Blood pressure is elevated on exam on my personal recheck  I would recommend monitoring intermittently at home  Blood pressure check in the office next week  Bring home blood pressure cuff along with you so we can check calibration  It is possible that he will need more medication/different medication to replace the benazepril your previously taking  I would recommend updated echocardiogram to assess heart structure and function given increased lower extremity edema/swelling

## 2020-08-12 NOTE — PROGRESS NOTES
Cardiology Office Visit    Indra De La Cruz  [id number]  1955    [facility]  [address] [facility] [place] [id number] [phone]      Dear Doroteo Carreno DO,    I had the pleasure of seeing your patient at our [address] [place] [address] office today 8/12/2020  As you know he is a pleasant 63y o  year old male with a medical history as described below  Patient previously followed with me at The [address] [place] [address] Drive  I had last seen the patient 10/19/2016  Reason for office visit:  Reestablish care for coronary artery disease, hypertension and hyperlipidemia  1  Coronary artery disease involving native coronary artery of native heart without angina pectoris  Assessment & Plan:  Coronary Artery Disease:  A  Inferior MI 6/4/2004 with VF arrest   B  PCI and stent to RCA 6/4/2004  C  Cardiac catheterization 10/2005: LM ok  LAD 20-30% proximal stenosis  Dx 50% ostial  LCx 50% OM and PLB with 50%  RCA 40% ISR  No intervention  EF 60%  D  Non ischemic Lexiscan stress test 8/12/2019  EF 54%  Patient denies any chest pain or shortness of breath  Patient is up-to-date with cardiac stress testing as he did undergo Lexiscan stress test 08/12/2019 which showed no evidence of scar or ischemia and an ejection fraction of 54%  Repeat echocardiogram given lower extremity edema  Orders:  -     Echo complete with contrast if indicated; Future; Expected date: 08/12/2020  -     NT-BNP PRO; Future  -     Basic metabolic panel; Future  -     Magnesium    2  S/P angioplasty with stent    3  Essential hypertension  Assessment & Plan:  Blood pressure is high on exam   Benazepril was stopped in the hospital due to angioedema  Recommend blood pressure check in office in 1 week with home blood pressure cuff  May need to consider additional medication to replace benazepril  Orders:  -     Echo complete with contrast if indicated;  Future; Expected date: 08/12/2020  -     NT-BNP PRO; Future  -     Basic metabolic panel; Future  -     Magnesium    4  Mixed hyperlipidemia  Assessment & Plan:  Patient is currently on rosuvastatin 40 mg daily as well as Zetia and fenofibrate  LDL 7/6/2020 was 82  Will discuss possible transition to PCSK9 inhibitor at follow up as despite multiple medications his LDL remains above goal      Orders:  -     Echo complete with contrast if indicated; Future; Expected date: 08/12/2020  -     NT-BNP PRO; Future  -     Basic metabolic panel; Future  -     Magnesium    5  Obstructive sleep apnea syndrome  Assessment & Plan:  Uses CPAP consistently at home  6  Localized edema  Assessment & Plan:  Patient as noted onset of lower extremity edema  Repeat echocardiogram   BNP with updated BMP and magnesium given electrolyte abnormalities during hospitalization  Orders:  -     Echo complete with contrast if indicated; Future; Expected date: 08/12/2020  -     NT-BNP PRO; Future  -     Basic metabolic panel; Future  -     Magnesium    7  Electrolyte abnormality  Assessment & Plan:  Significant hypomagnesemia noted during hospital stay 7/9/2020 along with hypokalemia  Repeat BMP and Magnesium  HPI     Patient presents to reestablish care for coronary artery disease, hypertension and hyperlipidemia  Patient has a history of MI with VF arrest 2004 at which time he underwent PCI of the right coronary artery  Patient also has a history of chronic kidney disease and was following with Nephrology when I had last seen him in 2016  Patient was [facility] 7/9/2020 with swelling of his face and tongue  This occurred after eating dinner on 07/09/2020  The patient was given Benadryl and steroids as well as epinephrine it was felt that the patient likely had developed angioedema from ACE-inhibitor    On admission he was noted to have hypokalemia with a potassium of 2 9 as well as hypomagnesemia with a magnesium of 0 4  Electrolytes were repleted  The patient felt well and was discharged home on 07/10/2020  Patient states that since his hospitalization he has been feeling significantly better  He reported prior to admission that he had significantly decreased energy and was not eating well  He did undergo a nuclear stress test 08/11/2019 which showed ejection fraction of 54% and was in normal limits  Patient has noted occasional lower extremity edema  He denies any chest pain  He denies any shortness of breath  He denies any lightheadedness or dizziness  He denies any palpitations        Patient Active Problem List   Diagnosis    Hypokalemia    Hyperglycemia    [facility]CK[facility] ([facility]) [facility]    HTN (hypertension)    Hyperlipidemia    Esophagitis    Coronary artery disease involving native coronary artery of native heart without angina pectoris    H/O acute myocardial infarction    Sleep apnea    Hypomagnesemia    Weight loss, non-intentional    S/P angioplasty with stent    Electrolyte abnormality    Localized edema     Past Medical History:   Diagnosis Date    Chronic kidney disease     Coronary artery disease     GERD (gastroesophageal reflux disease)     H/O heart artery stent     [facility]     Hyperlipidemia     Hypertension     MI (myocardial infarction) ([facility] )     LUCIO (obstructive sleep apnea)     Tobacco use      Social History     Socioeconomic History    Marital status: /Civil Union     Spouse name: Not on file    Number of children: Not on file    Years of education: Not on file    Highest education level: Not on file   Occupational History    Not on file   Social Needs    Financial resource strain: Not on file    Food insecurity     Worry: Not on file     Inability: Not on file    Transportation needs     Medical: Not on file     Non-medical: Not on file   Tobacco Use    Smoking status: Former Smoker     Packs/day: 0 50     Types: Cigarettes    Smokeless tobacco: Never Used   Substance and Sexual Activity    Alcohol use: Yes     Frequency: Monthly or less    Drug use: Never    Sexual activity: Not on file     Comment: Defer   Lifestyle    Physical activity     Days per week: Not on file     Minutes per session: Not on file    Stress: Not on file   Relationships    Social connections     Talks on phone: Not on file     Gets together: Not on file     Attends Gnosticist service: Not on file     Active member of club or organization: Not on file     Attends meetings of clubs or organizations: Not on file     Relationship status: Not on file    Intimate partner violence     Fear of current or ex partner: Not on file     Emotionally abused: Not on file     Physically abused: Not on file     Forced sexual activity: Not on file   Other Topics Concern    Not on file   Social History Narrative    Not on file      Family History   Problem Relation Age of Onset    Hypertension Mother    Huntley Lita Arthritis Mother     Hypertension Father     Hyperlipidemia Father     Hypertension Sister     Hyperlipidemia Sister     Hyperlipidemia Brother     Diabetes Paternal Grandmother      Past Surgical History:   Procedure Laterality Date    BACK SURGERY  2012    CARDIAC CATHETERIZATION  06/04/2004    PTCA and Cypher stent placement to the RCA(p)   CARDIAC CATHETERIZATION  09/16/2005    LAD(p) 20-30%  Dx 50% ostial  LCx 50%  OM and PLB 50%  RCA 40% ISR  No intervention  EF 60%   CARDIAC SURGERY      HERNIA REPAIR      ROTATOR CUFF REPAIR         Current Outpatient Medications:     allopurinol (ZYLOPRIM) 300 mg tablet, TAKE (1) TABLET BY MOUTH ONCE DAILY  , Disp: , Rfl:     amLODIPine (NORVASC) 2 5 mg tablet, TAKE (1) TABLET BY MOUTH DAILY IN THE MORNING, Disp: , Rfl:     aspirin (ECOTRIN LOW STRENGTH) 81 mg EC tablet, Take 81 mg by mouth daily after dinner Takes at 1800, Disp: , Rfl:     co-enzyme Q-10 100 mg capsule, Take 100 mg by mouth daily , Disp: , Rfl:     ezetimibe (ZETIA) 10 mg tablet, Take 10 mg by mouth daily at bedtime , Disp: , Rfl:     fluticasone (FLONASE) 50 mcg/act nasal spray, 2 sprays into each nostril every 12 (twelve) hours as needed (sinus infections) , Disp: , Rfl:     lansoprazole (PREVACID) 30 mg capsule, TAKE 1 CAPSULE BY MOUTH ONCE DAILY  , Disp: , Rfl:     metoprolol tartrate (LOPRESSOR) 100 mg tablet, TAKE 1 TABLET BY MOUTH TWICE DAILY  , Disp: , Rfl:     rosuvastatin (CRESTOR) 40 MG tablet, TAKE 1 TABLET BY MOUTH ONCE DAILY IN THE EVENING , Disp: , Rfl:     torsemide (DEMADEX) 10 mg tablet, TAKE (1) TABLET BY MOUTH ONCE DAILY  , Disp: , Rfl:   Allergies   Allergen Reactions    Ace Inhibitors Angioedema    Alprazolam Other (See Comments)     Other reaction(s): Other: Document details in comments  SEVERE DISOREINTATION/CRAZY  SEVERE DISOREINTATION/CRAZY      Buspirone      Other reaction(s): Other (See Comments), Other: Document details in comments  SEVERE DISORIENTATION/CRAZY  SEVERE DISORIENTATION/CRAZY      Lorazepam      Other reaction(s): Other (See Comments), Other: Document details in comments  SEVERE DISORIENTATION/CRAZY  SEVERE DISORIENTATION/CRAZY         Cardiac Testing:    Nuclear Lexiscan Stress Test 8/12/2019:   Normal study  EF 54%  Echocardiogram(5/11/2015):  EF 55-60%  Mild MR  Mild TR  Holter Monitor - (7/6/2004) 27 VE beats  1 idioventricular episode 2 4 seconds  EKG - (4/15/2016) NSR 70 BPM  Inferior MI  Nuclear Stress Test - (5/11/2015) [place]  No scar  No ischemia  EF 57%  Cardiac Procedures:    Cardiac Catheterization - (9/16/2005) LM ok  LAD 20-30% proximal stenosis  Dx 50% ostial  LCx 50% OM and PLB with 50%  RCA 40% ISR  No intervention  EF 60%  Cardiac Catheterization - (6/4/2004) PTCA and Cypher stent placement to the Proximal RCA  Percutaneous Coronary Intervention - (6/4/2004) Cypher stent to RCA        Review of Systems:    Review of Systems   Constitutional: Positive for unexpected weight change (20 pound weight loss)  Negative for activity change, appetite change and fatigue  HENT: Negative for congestion, hearing loss, tinnitus and trouble swallowing  Eyes: Negative for visual disturbance  Respiratory: Negative for cough, chest tightness, shortness of breath and wheezing  Cardiovascular: Positive for leg swelling  Negative for chest pain and palpitations  Gastrointestinal: Negative for abdominal distention, abdominal pain, nausea and vomiting  Genitourinary: Negative for difficulty urinating  Musculoskeletal: Negative for arthralgias  Skin: Negative for rash  Neurological: Negative for dizziness, syncope and light-headedness  Hematological: Does not bruise/bleed easily  Psychiatric/Behavioral: Negative for confusion  The patient is not nervous/anxious  All other systems reviewed and are negative  Vitals:    08/12/20 1315   BP: 132/80   BP Location: Left arm   Patient Position: Sitting   Cuff Size: Adult   Pulse: 58   Temp: (!) 97 2 °F (36 2 °C)   Weight: 120 kg (265 lb 9 6 oz)   Height: 6' 2" (1 88 m)     Vitals:    08/12/20 1315   Weight: 120 kg (265 lb 9 6 oz)     Height: 6' 2" (188 cm)     Physical Exam   Constitutional: He is oriented to person, place, and time  He appears well-developed and well-nourished  HENT:   Head: Normocephalic and atraumatic  Eyes: Pupils are equal, round, and reactive to light  Conjunctivae are normal    Neck: Normal range of motion  No JVD present  Cardiovascular: Normal rate, regular rhythm and normal heart sounds  Exam reveals no gallop and no friction rub  No murmur heard  Pulmonary/Chest: Effort normal and breath sounds normal    Abdominal: Soft  Bowel sounds are normal    Musculoskeletal:         General: Edema present  Neurological: He is alert and oriented to person, place, and time  Skin: Skin is warm and dry  Psychiatric: He has a normal mood and affect   His behavior is normal  Vitals reviewed

## 2020-08-21 ENCOUNTER — CLINICAL SUPPORT (OUTPATIENT)
Dept: CARDIOLOGY CLINIC | Facility: CLINIC | Age: 65
End: 2020-08-21
Payer: COMMERCIAL

## 2020-08-21 VITALS — HEART RATE: 57 BPM | DIASTOLIC BLOOD PRESSURE: 80 MMHG | SYSTOLIC BLOOD PRESSURE: 130 MMHG

## 2020-08-21 DIAGNOSIS — I10 HYPERTENSION, UNSPECIFIED TYPE: Primary | ICD-10-CM

## 2020-08-21 PROCEDURE — 99211 OFF/OP EST MAY X REQ PHY/QHP: CPT

## 2020-08-21 NOTE — PROGRESS NOTES
Patient came to office for blood pressure check  Vitals were found to be as follows:  Pulse 57  /80    Patient brought in his blood pressure cuff for calibration  His cuff registered /72 Pulse 50  Also instructed patient to make sure that his arm is at heart level and supported, and to place cuff on bare skin while taking at home

## 2020-08-23 PROBLEM — T78.3XXA ANGIO-EDEMA, INITIAL ENCOUNTER: Status: RESOLVED | Noted: 2020-07-10 | Resolved: 2020-08-23

## 2020-08-23 PROBLEM — Z95.820 S/P ANGIOPLASTY WITH STENT: Status: ACTIVE | Noted: 2020-08-23

## 2020-08-23 PROBLEM — E87.8 ELECTROLYTE ABNORMALITY: Status: ACTIVE | Noted: 2020-08-23

## 2020-08-23 PROBLEM — R60.0 LOCALIZED EDEMA: Status: ACTIVE | Noted: 2020-08-23

## 2020-08-23 PROBLEM — N17.9 ACUTE KIDNEY INJURY (HCC): Status: RESOLVED | Noted: 2020-07-10 | Resolved: 2020-08-23

## 2020-08-23 NOTE — ASSESSMENT & PLAN NOTE
Coronary Artery Disease:  A  Inferior MI 6/4/2004 with VF arrest   B  PCI and stent to RCA 6/4/2004  C  Cardiac catheterization 10/2005: LM ok  LAD 20-30% proximal stenosis  Dx 50% ostial  LCx 50% OM and PLB with 50%  RCA 40% ISR  No intervention  EF 60%  D  Non ischemic Lexiscan stress test 8/12/2019  EF 54%  Patient denies any chest pain or shortness of breath  Patient is up-to-date with cardiac stress testing as he did undergo Lexiscan stress test 08/12/2019 which showed no evidence of scar or ischemia and an ejection fraction of 54%  Repeat echocardiogram given lower extremity edema

## 2020-08-24 NOTE — ASSESSMENT & PLAN NOTE
Patient is currently on rosuvastatin 40 mg daily as well as Zetia and fenofibrate  LDL 7/6/2020 was 82     Will discuss possible transition to PCSK9 inhibitor at follow up as despite multiple medications his LDL remains above goal

## 2020-08-24 NOTE — ASSESSMENT & PLAN NOTE
Blood pressure is high on exam   Benazepril was stopped in the hospital due to angioedema  Recommend blood pressure check in office in 1 week with home blood pressure cuff  May need to consider additional medication to replace benazepril

## 2020-08-24 NOTE — ASSESSMENT & PLAN NOTE
Significant hypomagnesemia noted during hospital stay 7/9/2020 along with hypokalemia  Repeat BMP and Magnesium

## 2020-08-24 NOTE — ASSESSMENT & PLAN NOTE
Patient as noted onset of lower extremity edema  Repeat echocardiogram   BNP with updated BMP and magnesium given electrolyte abnormalities during hospitalization

## 2020-08-24 NOTE — PROGRESS NOTES
Please call the patient and let him know that I am aware of BP reading  Continue current medications without change  Thank you

## 2020-09-18 ENCOUNTER — HOSPITAL ENCOUNTER (OUTPATIENT)
Dept: NON INVASIVE DIAGNOSTICS | Facility: HOSPITAL | Age: 65
Discharge: HOME | End: 2020-09-18
Attending: INTERNAL MEDICINE
Payer: COMMERCIAL

## 2020-09-18 DIAGNOSIS — I10 ESSENTIAL HYPERTENSION: ICD-10-CM

## 2020-09-18 DIAGNOSIS — E78.2 MIXED HYPERLIPIDEMIA: ICD-10-CM

## 2020-09-18 DIAGNOSIS — R60.0 LOCALIZED EDEMA: ICD-10-CM

## 2020-09-18 DIAGNOSIS — I25.10 CORONARY ARTERY DISEASE INVOLVING NATIVE CORONARY ARTERY OF NATIVE HEART WITHOUT ANGINA PECTORIS: ICD-10-CM

## 2020-09-18 PROCEDURE — 93306 TTE W/DOPPLER COMPLETE: CPT

## 2020-09-18 PROCEDURE — 93306 TTE W/DOPPLER COMPLETE: CPT | Performed by: INTERNAL MEDICINE

## 2020-09-18 RX ADMIN — PERFLUTREN 0.6 ML/MIN: 6.52 INJECTION, SUSPENSION INTRAVENOUS at 16:22

## 2021-02-10 ENCOUNTER — CLINICAL SUPPORT (OUTPATIENT)
Dept: CARDIOLOGY CLINIC | Facility: CLINIC | Age: 66
End: 2021-02-10
Payer: COMMERCIAL

## 2021-02-10 VITALS
HEART RATE: 60 BPM | WEIGHT: 274.4 LBS | OXYGEN SATURATION: 95 % | HEIGHT: 74 IN | DIASTOLIC BLOOD PRESSURE: 70 MMHG | BODY MASS INDEX: 35.22 KG/M2 | TEMPERATURE: 97.7 F | SYSTOLIC BLOOD PRESSURE: 152 MMHG

## 2021-02-10 DIAGNOSIS — I10 ESSENTIAL HYPERTENSION: Primary | ICD-10-CM

## 2021-02-10 PROCEDURE — 99211 OFF/OP EST MAY X REQ PHY/QHP: CPT

## 2021-02-12 ENCOUNTER — TELEPHONE (OUTPATIENT)
Dept: CARDIOLOGY CLINIC | Facility: CLINIC | Age: 66
End: 2021-02-12

## 2021-02-12 DIAGNOSIS — I10 ESSENTIAL HYPERTENSION: Primary | ICD-10-CM

## 2021-02-12 RX ORDER — AMLODIPINE BESYLATE 5 MG/1
5 TABLET ORAL DAILY
Qty: 30 TABLET | Refills: 11 | Status: SHIPPED | OUTPATIENT
Start: 2021-02-12 | End: 2022-05-06 | Stop reason: SDUPTHER

## 2021-02-12 NOTE — TELEPHONE ENCOUNTER
Wife called stating BP was 147/67 after doubling amlodipine x 2 days  If he is to continue with that dose, he will need a new rx sent to pharmacy

## 2021-02-12 NOTE — PROGRESS NOTES
Pt her under the direction of Dr Maggie Berg    Per Dr Maggie Berg, he is to double his amlodipine to 5 mg

## 2021-02-15 RX ORDER — AMLODIPINE BESYLATE 5 MG/1
5 TABLET ORAL DAILY
Qty: 30 TABLET | Refills: 11 | Status: CANCELLED | OUTPATIENT
Start: 2021-02-15

## 2021-02-22 ENCOUNTER — TELEPHONE (OUTPATIENT)
Dept: OBGYN CLINIC | Facility: CLINIC | Age: 66
End: 2021-02-22

## 2021-02-22 ENCOUNTER — HOSPITAL ENCOUNTER (EMERGENCY)
Facility: HOSPITAL | Age: 66
Discharge: HOME | End: 2021-02-22
Attending: EMERGENCY MEDICINE | Admitting: EMERGENCY MEDICINE
Payer: OTHER MISCELLANEOUS

## 2021-02-22 ENCOUNTER — APPOINTMENT (OUTPATIENT)
Dept: RADIOLOGY | Facility: HOSPITAL | Age: 66
End: 2021-02-22
Payer: OTHER MISCELLANEOUS

## 2021-02-22 ENCOUNTER — APPOINTMENT (EMERGENCY)
Dept: RADIOLOGY | Facility: HOSPITAL | Age: 66
End: 2021-02-22
Payer: OTHER MISCELLANEOUS

## 2021-02-22 VITALS
HEART RATE: 50 BPM | DIASTOLIC BLOOD PRESSURE: 70 MMHG | RESPIRATION RATE: 14 BRPM | SYSTOLIC BLOOD PRESSURE: 149 MMHG | HEIGHT: 75 IN | TEMPERATURE: 97.7 F | BODY MASS INDEX: 35.77 KG/M2 | WEIGHT: 287.7 LBS | OXYGEN SATURATION: 95 %

## 2021-02-22 DIAGNOSIS — S62.101A CLOSED FRACTURE OF RIGHT WRIST, INITIAL ENCOUNTER: Primary | ICD-10-CM

## 2021-02-22 PROCEDURE — 99284 EMERGENCY DEPT VISIT MOD MDM: CPT

## 2021-02-22 PROCEDURE — 99284 EMERGENCY DEPT VISIT MOD MDM: CPT | Performed by: ORTHOPAEDIC SURGERY

## 2021-02-22 PROCEDURE — 96374 THER/PROPH/DIAG INJ IV PUSH: CPT

## 2021-02-22 PROCEDURE — 99285 EMERGENCY DEPT VISIT HI MDM: CPT | Performed by: EMERGENCY MEDICINE

## 2021-02-22 PROCEDURE — 73090 X-RAY EXAM OF FOREARM: CPT

## 2021-02-22 PROCEDURE — 73110 X-RAY EXAM OF WRIST: CPT

## 2021-02-22 PROCEDURE — 25605 CLTX DST RDL FX/EPHYS SEP W/: CPT | Performed by: ORTHOPAEDIC SURGERY

## 2021-02-22 PROCEDURE — 73100 X-RAY EXAM OF WRIST: CPT

## 2021-02-22 RX ORDER — LIDOCAINE HYDROCHLORIDE 10 MG/ML
10 INJECTION, SOLUTION EPIDURAL; INFILTRATION; INTRACAUDAL; PERINEURAL ONCE
Status: COMPLETED | OUTPATIENT
Start: 2021-02-22 | End: 2021-02-22

## 2021-02-22 RX ORDER — HYDROCODONE BITARTRATE AND ACETAMINOPHEN 5; 325 MG/1; MG/1
1 TABLET ORAL EVERY 6 HOURS PRN
Qty: 9 TABLET | Refills: 0 | Status: SHIPPED | OUTPATIENT
Start: 2021-02-22 | End: 2021-02-24 | Stop reason: SDUPTHER

## 2021-02-22 RX ORDER — MORPHINE SULFATE 4 MG/ML
4 INJECTION, SOLUTION INTRAMUSCULAR; INTRAVENOUS ONCE
Status: COMPLETED | OUTPATIENT
Start: 2021-02-22 | End: 2021-02-22

## 2021-02-22 RX ADMIN — LIDOCAINE HYDROCHLORIDE 10 ML: 10 INJECTION, SOLUTION EPIDURAL; INFILTRATION; INTRACAUDAL; PERINEURAL at 12:23

## 2021-02-22 RX ADMIN — MORPHINE SULFATE 4 MG: 4 INJECTION INTRAVENOUS at 11:38

## 2021-02-22 NOTE — ED ATTENDING ATTESTATION
2/22/2021  I, Heidy Cueto MD, saw and evaluated the patient  I have discussed the patient with the resident/non-physician practitioner and agree with the resident's/non-physician practitioner's findings, Plan of Care, and MDM as documented in the resident's/non-physician practitioner's note, except where noted  All available labs and Radiology studies were reviewed  I was present for key portions of any procedure(s) performed by the resident/non-physician practitioner and I was immediately available to provide assistance  At this point I agree with the current assessment done in the Emergency Department        ED Course         Critical Care Time  Procedures

## 2021-02-22 NOTE — Clinical Note
Ramon Haq was seen and treated in our emergency department on 2/22/2021  No work until cleared by Family Doctor/Orthopedics        Diagnosis:     Brand Nicolas    He may return on this date: If you have any questions or concerns, please don't hesitate to call        Cruz Chang PA-C    ______________________________           _______________          _______________  Hospital Representative                              Date                                Time

## 2021-02-22 NOTE — ED PROVIDER NOTES
History  Chief Complaint   Patient presents with    Wrist Injury     pt slipped on ice and fell forward bracing fall with hands  pt c/o right wrist pain w/noticible deformity per EMS  denies hitting head/loc  74-year-old male presents the emergency department for evaluation of right wrist injury  Patient states while at work he slipped on ice and fell forward  Reports he braced himself with both hands  States right wrist "took the brunt of the fall " Patient denies hitting his head or LOC  States he did land on his knees  Denies any knee pain  States he was able to stand and ambulate on his own after the fall  Denies any nausea, vomiting, headaches, visual changes  Reports significant discomfort in the right wrist   Denies shoulder elbow pain  Denies any chest pain or difficulty breathing  Denies any previous injury  Denies any weakness, numbness, paresthesias  History provided by:  Patient  Wrist Pain  Location:  Right wrist  Severity:  Severe  Onset quality:  Sudden  Duration:  30 minutes  Timing:  Constant  Chronicity:  New  Context:  FOOSH  Ineffective treatments:  Splint and ice by EMS  Associated symptoms: no abdominal pain, no chest pain, no congestion, no cough, no diarrhea, no ear pain, no fatigue, no fever, no headaches, no loss of consciousness, no myalgias, no nausea, no rash, no rhinorrhea, no shortness of breath, no sore throat, no vomiting and no wheezing        Prior to Admission Medications   Prescriptions Last Dose Informant Patient Reported? Taking?   allopurinol (ZYLOPRIM) 300 mg tablet   Yes Yes   Sig: TAKE (1) TABLET BY MOUTH ONCE DAILY     amLODIPine (NORVASC) 5 mg tablet   No Yes   Sig: Take 1 tablet (5 mg total) by mouth daily   aspirin (ECOTRIN LOW STRENGTH) 81 mg EC tablet   Yes Yes   Sig: Take 81 mg by mouth daily after dinner Takes at 1800   co-enzyme Q-10 100 mg capsule   Yes Yes   Sig: Take 100 mg by mouth daily    ezetimibe (ZETIA) 10 mg tablet   Yes Yes   Sig: Take 10 mg by mouth daily at bedtime    fluticasone (FLONASE) 50 mcg/act nasal spray  Spouse/Significant Other Yes Yes   Si sprays into each nostril every 12 (twelve) hours as needed (sinus infections)    lansoprazole (PREVACID) 30 mg capsule   Yes Yes   Sig: TAKE 1 CAPSULE BY MOUTH ONCE DAILY  metoprolol tartrate (LOPRESSOR) 100 mg tablet   Yes Yes   Sig: TAKE 1 TABLET BY MOUTH TWICE DAILY  rosuvastatin (CRESTOR) 40 MG tablet   Yes Yes   Sig: TAKE 1 TABLET BY MOUTH ONCE DAILY IN THE EVENING  torsemide (DEMADEX) 10 mg tablet   Yes Yes   Sig: TAKE (1) TABLET BY MOUTH ONCE DAILY  Facility-Administered Medications: None       Past Medical History:   Diagnosis Date    Chronic kidney disease     Coronary artery disease     GERD (gastroesophageal reflux disease)     H/O heart artery stent     [facility]     Hyperlipidemia     Hypertension     [facility] )     LUCIO (obstructive sleep apnea)     Tobacco use        Past Surgical History:   Procedure Laterality Date    BACK SURGERY      CARDIAC CATHETERIZATION  2004    PTCA and Cypher stent placement to the RCA(p)   CARDIAC CATHETERIZATION  2005    LAD(p) 20-30%  Dx 50% ostial  LCx 50%  OM and PLB 50%  RCA 40% ISR  No intervention  EF 60%   CARDIAC SURGERY      HERNIA REPAIR      ROTATOR CUFF REPAIR         Family History   Problem Relation Age of Onset    Hypertension Mother    Eduaradrianna Holder Arthritis Mother     Hypertension Father     Hyperlipidemia Father     Hypertension Sister     Hyperlipidemia Sister     Hyperlipidemia Brother     Diabetes Paternal Grandmother      I have reviewed and agree with the history as documented      E-Cigarette/Vaping    E-Cigarette Use Never User      E-Cigarette/Vaping Substances    Nicotine No     THC No     CBD No     Flavoring No      Social History     Tobacco Use    Smoking status: Former Smoker     Packs/day: 0 50     Types: Cigarettes    Smokeless tobacco: Never Used   Substance Use Topics    Alcohol use: Yes     Frequency: Monthly or less    Drug use: Never       Review of Systems   Constitutional: Negative  Negative for appetite change, chills, diaphoresis, fatigue and fever  HENT: Negative  Negative for congestion, ear pain, rhinorrhea and sore throat  Eyes: Negative for visual disturbance  Respiratory: Negative  Negative for cough, choking, chest tightness, shortness of breath, wheezing and stridor  Cardiovascular: Negative  Negative for chest pain, palpitations and leg swelling  Gastrointestinal: Negative  Negative for abdominal pain, diarrhea, nausea and vomiting  Genitourinary: Negative  Musculoskeletal: Positive for arthralgias and joint swelling  Negative for back pain, gait problem, myalgias, neck pain and neck stiffness  Skin: Negative  Negative for color change, pallor, rash and wound  Neurological: Negative  Negative for loss of consciousness and headaches  All other systems reviewed and are negative  Physical Exam  Physical Exam  Vitals signs and nursing note reviewed  Constitutional:       General: He is not in acute distress  Appearance: Normal appearance  He is not ill-appearing, toxic-appearing or diaphoretic  HENT:      Head: Normocephalic and atraumatic  Nose: Nose normal  No congestion or rhinorrhea  Mouth/Throat:      Mouth: Mucous membranes are moist       Pharynx: Oropharynx is clear  No oropharyngeal exudate or posterior oropharyngeal erythema  Eyes:      Extraocular Movements: Extraocular movements intact  Conjunctiva/sclera: Conjunctivae normal       Pupils: Pupils are equal, round, and reactive to light  Neck:      Musculoskeletal: Normal range of motion and neck supple  No muscular tenderness  Cardiovascular:      Rate and Rhythm: Normal rate and regular rhythm  Pulses:           Radial pulses are 2+ on the right side and 2+ on the left side     Pulmonary: Effort: Pulmonary effort is normal  No respiratory distress  Breath sounds: Normal breath sounds  No stridor  No wheezing, rhonchi or rales  Chest:      Chest wall: No tenderness  Abdominal:      General: Abdomen is flat  Bowel sounds are normal  There is no distension  Palpations: Abdomen is soft  Tenderness: There is no abdominal tenderness  There is no guarding  Musculoskeletal:         General: Swelling, tenderness and deformity present  Right shoulder: Normal       Left shoulder: Normal       Right elbow: Normal He exhibits normal range of motion, no swelling, no effusion and no deformity  No tenderness found  Left elbow: Normal       Right wrist: He exhibits decreased range of motion, tenderness, bony tenderness, swelling and deformity  He exhibits no laceration  Left wrist: Normal       Right hip: Normal       Left hip: Normal       Right knee: Normal       Left knee: Normal       Cervical back: Normal       Thoracic back: Normal       Lumbar back: Normal       Right forearm: He exhibits tenderness, bony tenderness and deformity  Right hand: He exhibits decreased range of motion  He exhibits no tenderness, no bony tenderness, normal capillary refill, no laceration and no swelling  Normal sensation noted  Normal strength noted  Skin:     General: Skin is warm and dry  Capillary Refill: Capillary refill takes less than 2 seconds  Coloration: Skin is not pale  Findings: No bruising, erythema or rash  Neurological:      General: No focal deficit present  Mental Status: He is alert and oriented to person, place, and time  Sensory: No sensory deficit  Motor: No weakness  Coordination: Coordination normal       Gait: Gait normal       Deep Tendon Reflexes: Reflexes normal    Psychiatric:         Mood and Affect: Mood normal          Behavior: Behavior normal          Thought Content:  Thought content normal          Judgment: Judgment normal          Vital Signs  ED Triage Vitals   Temperature Pulse Respirations Blood Pressure SpO2   02/22/21 1033 02/22/21 1033 02/22/21 1033 02/22/21 1033 02/22/21 1032   97 7 °F (36 5 °C) (!) 50 14 (!) 183/81 96 %      Temp Source Heart Rate Source Patient Position - Orthostatic VS BP Location FiO2 (%)   02/22/21 1033 02/22/21 1033 02/22/21 1033 02/22/21 1033 --   Temporal Monitor Sitting Left arm       Pain Score       02/22/21 1033       6           Vitals:    02/22/21 1130 02/22/21 1215 02/22/21 1230 02/22/21 1255   BP: 134/63 152/67 151/75 149/70   Pulse: (!) 51 (!) 50 (!) 50 (!) 50   Patient Position - Orthostatic VS: Lying Lying Lying Lying         Visual Acuity      ED Medications  Medications   morphine (PF) 4 mg/mL injection 4 mg (4 mg Intravenous Given 2/22/21 1138)   lidocaine (PF) (XYLOCAINE-MPF) 1 % injection 10 mL (10 mL Infiltration Given by Other 2/22/21 1223)       Diagnostic Studies  Results Reviewed     None                 XR wrist 2 vw right   Final Result by Nelson Yanez MD (02/22 1343)      Status post reduction of the distal right radial fracture            Workstation performed: [id number]         XR forearm 2 views RIGHT   Final Result by Montrell St MD (02/22 1211)   Acute fractures distal radius and ulna         Workstation performed: [id number]         XR wrist 3+ views RIGHT   Final Result by Montrell St MD (02/22 1212)      Acute fractures of distal radius and ulna            Workstation performed: [id number]                    Procedures  Procedures         ED Course                             SBIRT 22yo+      Most Recent Value   SBIRT (23 yo +)   In order to provide better care to our patients, we are screening all of our patients for alcohol and drug use  Would it be okay to ask you these screening questions? Yes Filed at: 02/22/2021 1045   Initial Alcohol Screen: US AUDIT-C    1  How often do you have a drink containing alcohol?   1 Filed at: 02/22/2021 1047 2  How many drinks containing alcohol do you have on a typical day you are drinking? 1 Filed at: 02/22/2021 1045   3a  Male UNDER 65: How often do you have five or more drinks on one occasion? 1 Filed at: 02/22/2021 1045   Audit-C Score  3 Filed at: 02/22/2021 1045   PADMINI: How many times in the past year have you    Used an illegal drug or used a prescription medication for non-medical reasons? Never Filed at: 02/22/2021 1045                    MDM  Number of Diagnoses or Management Options  Closed fracture of right wrist, initial encounter: new and requires workup  Diagnosis management comments: 63year old male presents to the emergency department for evaluation of right wrist injury status post slip and fall on ice  [address] [place] [address] Rd injury  Vitals and medical record reviewed  Deformity noted  Palpable radial pulse  No head strike or LOC  X-rays confirming distal ulnar and radius fracture  Discussed with Orthopedics who saw patient at bedside, reduce the injury and applied splint  Patient was educated on results, findings, symptomatic treatment and symptoms that require prompt return to the ED for further evaluation verbalized understanding  He will follow-up with orthopedics on Friday         Amount and/or Complexity of Data Reviewed  Clinical lab tests: ordered and reviewed  Tests in the radiology section of CPT®: ordered and reviewed  Review and summarize past medical records: yes  Discuss the patient with other providers: yes  Independent visualization of images, tracings, or specimens: yes        Disposition  Final diagnoses:   Closed fracture of right wrist, initial encounter     Time reflects when diagnosis was documented in both MDM as applicable and the Disposition within this note     Time User Action Codes Description Comment    2/22/2021 12:50 PM Miles Encarnacion Add [U00 101A] Closed fracture of right wrist, initial encounter       ED Disposition     ED Disposition Condition Date/Time Comment Discharge Stable Mon Feb 22, 2021 12:50 PM Sierra Herbert discharge to home/self care  Follow-up Information     Follow up With Specialties Details Why Robert Casper 55, DO Family Medicine In 1 week As needed, If symptoms worsen Cyrus Gonzalez C/ [address]  [address] [facility] In 3 days PLEASE SEE DR Gary Avilezsworth [facility] [address]  [address]  [address] [place] [address],[place] [address] [phone]            Discharge Medication List as of 2/22/2021 12:53 PM      START taking these medications    Details   HYDROcodone-acetaminophen (NORCO) 5-325 mg per tablet Take 1 tablet by mouth every 6 (six) hours as needed for pain for up to 3 daysMax Daily Amount: 4 tablets, Starting Mon 2/22/2021, Until Thu 2/25/2021, Normal         CONTINUE these medications which have NOT CHANGED    Details   allopurinol (ZYLOPRIM) 300 mg tablet TAKE (1) TABLET BY MOUTH ONCE DAILY  , Historical Med      amLODIPine (NORVASC) 5 mg tablet Take 1 tablet (5 mg total) by mouth daily, Starting Fri 2/12/2021, Normal      aspirin (ECOTRIN LOW STRENGTH) 81 mg EC tablet Take 81 mg by mouth daily after dinner Takes at 1800, Historical Med      co-enzyme Q-10 100 mg capsule Take 100 mg by mouth daily , Historical Med      ezetimibe (ZETIA) 10 mg tablet Take 10 mg by mouth daily at bedtime , Historical Med      fluticasone (FLONASE) 50 mcg/act nasal spray 2 sprays into each nostril every 12 (twelve) hours as needed (sinus infections) , Historical Med      lansoprazole (PREVACID) 30 mg capsule TAKE 1 CAPSULE BY MOUTH ONCE DAILY  , Historical Med      metoprolol tartrate (LOPRESSOR) 100 mg tablet TAKE 1 TABLET BY MOUTH TWICE DAILY  , Historical Med      rosuvastatin (CRESTOR) 40 MG tablet TAKE 1 TABLET BY MOUTH ONCE DAILY IN THE EVENING , Historical Med      torsemide (DEMADEX) 10 mg tablet TAKE (1) TABLET BY MOUTH ONCE DAILY  , Historical Med           No discharge procedures on file      PDMP Review     None          ED Provider  Electronically Signed by           Jomar Betancourt PA-C  02/22/21 310 [facility] MD Matthew  02/23/21 2089

## 2021-02-22 NOTE — ED NOTES
Dr Franck Estrada at pt bedside performed right wrist reduction w/nerve block and splinted extremity  Pt tolerated procedure well  V/S stable w/o complaints        Batsheva Christensen RN  02/22/21 7977

## 2021-02-22 NOTE — CONSULTS
Consultation - Orthopedics   Mey Ball 63 y o  male MRN: [id number]  Unit/Bed#:  13A Encounter: [id number]      Assessment/Plan     Assessment:  Comminuted, displaced fracture distal right radius and ulna  Plan:   I discussed the risks, benefits, options and alternatives of treatment  After a thorough discussion of both operative and non operative alternatives, he elected to proceed with closed reduction with a local anesthetic  This was performed in the emergency room with acceptable alignment in a sugar-tong splint  He does understand that there is significant comminution and that displacement may occur requiring additional surgical intervention  I will see him in my office on 02/26/2021  X-rays of the wrist will be obtained in the AP and lateral images  If this does show any loss of reduction, referral to hand/ upper extremity for surgical treatment would be indicated  He was provided with analgesic medication by the emergency room  He was instructed to contact me if questions or concerns arise  He is to maintain the splint, keep the splint clean and dry and to use ice and elevation over the next 48-72 hours  History of Present Illness   Physician Requesting Consult: Aric Iraheta MD  Reason for Consult / Principal Problem:   Fracture right wrist  HPI: Mey Ball is a 63y o  year old right-hand-dominant male who presents with  Injury to his right wrist when he fell at work slipping on ice earlier today  He was brought to the emergency room where he was evaluated, x-rays obtained and orthopedic consultation was requested  The patient was seen by myself  He notes paresthesias in the median nerve distribution of the right hand  He does admit to significant carpal tunnel symptoms in his left hand  He denies any additional injuries occurring when he fell today     He denies any history of previous injuries to his right wrist   He is status post right rotator cuff repair without residual symptoms  Consults    Review of Systems :  The patient is 10 organ system review was negative excluding as indicated in the history of chief complaint above  Historical Information   Past Medical History:   Diagnosis Date    Chronic kidney disease     Coronary artery disease     GERD (gastroesophageal reflux disease)     H/O heart artery stent     [facility]     Hyperlipidemia     Hypertension     [facility]     LUCIO (obstructive sleep apnea)     Tobacco use      Past Surgical History:   Procedure Laterality Date    BACK SURGERY  2012    CARDIAC CATHETERIZATION  06/04/2004    PTCA and Cypher stent placement to the RCA(p)   CARDIAC CATHETERIZATION  09/16/2005    LAD(p) 20-30%  Dx 50% ostial  LCx 50%  OM and PLB 50%  RCA 40% ISR  No intervention  EF 60%   CARDIAC SURGERY      HERNIA REPAIR      ROTATOR CUFF REPAIR       Social History   Social History     Substance and Sexual Activity   Alcohol Use Yes    Frequency: Monthly or less     Social History     Substance and Sexual Activity   Drug Use Never     E-Cigarette/Vaping    E-Cigarette Use Never User      E-Cigarette/Vaping Substances    Nicotine No     THC No     CBD No     Flavoring No      Social History     Tobacco Use   Smoking Status Former Smoker    Packs/day: 0 50    Types: Cigarettes   Smokeless Tobacco Never Used     Family History: non-contributory    Meds/Allergies   all current active meds have been reviewed  Allergies   Allergen Reactions    Ace Inhibitors Angioedema    Alprazolam Other (See Comments)     Other reaction(s): Other: Document details in comments  SEVERE DISOREINTATION/CRAZY  SEVERE DISOREINTATION/CRAZY      Buspirone      Other reaction(s): Other (See Comments), Other: Document details in comments  SEVERE DISORIENTATION/CRAZY  SEVERE DISORIENTATION/CRAZY      Lorazepam      Other reaction(s):  Other (See Comments), Other: Document details in comments  SEVERE DISORIENTATION/CRAZY  SEVERE DISORIENTATION/CRAZY         Objective   Vitals: Blood pressure 149/70, pulse (!) 50, temperature 97 7 °F (36 5 °C), temperature source Temporal, resp  rate 14, height 6' 3" (1 905 m), weight 130 kg (287 lb 11 2 oz), SpO2 95 %  ,Body mass index is 35 96 kg/m²  No intake or output data in the 24 hours ending 02/22/21 1303  No intake/output data recorded  Invasive Devices     None                 Physical Exam :  Zahira Kulkarni is alert and oriented and seems to be in no acute distress  His right wrist is splinted  HEENT exam is benign   Heart regular, peripheral pulses palpable   lungs clear   abdomen soft and nontender   clavicles and ribs nontender   Skin without lacerations or abrasions  Ortho Exam: The right upper extremity exam demonstrates the splint in place at the right wrist   There is obvious deformity without laceration or abrasion  Distal sensation is grossly intact although he does indicate subjectively decreased sensation in the index and long fingers  The right elbow, humerus and shoulder exams are benign  The left upper extremity exam and bilateral lower extremity examinations are benign  X-rays of his wrist and forearm were reviewed  These demonstrate a completely dorsally displaced, shortened and impacted fracture of the distal radius and ulnar styloid fracture  Post reduction x-rays demonstrate acceptable alignment with significant comminution noted primarily on the lateral image  Imaging Studies: I have personally reviewed pertinent reports  Procedure:  After a thorough discussion of the risks, benefits, options and alternatives of treatment, it was elected to proceed with closed reduction under a local anesthetic  10 cc of 1% plain lidocaine were injected as a hematoma block after a time-out was performed    After adequate analgesia, a closed reduction of the distal radius and ulnar styloid fracture was performed in standard fashion followed by application of a sugar-tong splint with molding  Postreduction x-rays were obtained demonstrating acceptable alignment

## 2021-02-22 NOTE — TELEPHONE ENCOUNTER
LVM That we need all his workers comp info for his appt, mainly name, address and phone to where we will be submitting his medical claims

## 2021-02-24 ENCOUNTER — TELEPHONE (OUTPATIENT)
Dept: OBGYN CLINIC | Facility: HOSPITAL | Age: 66
End: 2021-02-24

## 2021-02-24 ENCOUNTER — TELEPHONE (OUTPATIENT)
Dept: OBGYN CLINIC | Facility: MEDICAL CENTER | Age: 66
End: 2021-02-24

## 2021-02-24 DIAGNOSIS — S62.101A CLOSED FRACTURE OF RIGHT WRIST, INITIAL ENCOUNTER: ICD-10-CM

## 2021-02-24 RX ORDER — HYDROCODONE BITARTRATE AND ACETAMINOPHEN 5; 325 MG/1; MG/1
1 TABLET ORAL EVERY 6 HOURS PRN
Qty: 9 TABLET | Refills: 0 | Status: CANCELLED | OUTPATIENT
Start: 2021-02-24 | End: 2021-02-27

## 2021-02-24 RX ORDER — HYDROCODONE BITARTRATE AND ACETAMINOPHEN 5; 325 MG/1; MG/1
1 TABLET ORAL EVERY 6 HOURS PRN
Qty: 20 TABLET | Refills: 0 | Status: SHIPPED | OUTPATIENT
Start: 2021-02-24 | End: 2022-05-06 | Stop reason: ALTCHOICE

## 2021-02-24 NOTE — TELEPHONE ENCOUNTER
Patient called in requesting to speak to [facility]  He stated he would like an afternoon appt instead  Reach out to the office and Pollie Meigs took a msg

## 2021-02-24 NOTE — TELEPHONE ENCOUNTER
Johann Ziegler, patient's wife is calling to find out if Dr Sim Dong could give her  a work note that would keep him out of work until seen by Dr Samantha Parker on 3/2/21  Please let her know if we cannot or fax it to her at [phone]       [phone]

## 2021-02-24 NOTE — TELEPHONE ENCOUNTER
Pt has an apt for Tuesday w/ Dr Jamal Zavala, however, pt is in a lot of pain and will take his last dose this morn   Please call back to advise if med can/not be refilled

## 2021-02-25 ENCOUNTER — OFFICE VISIT (OUTPATIENT)
Dept: OBGYN CLINIC | Facility: MEDICAL CENTER | Age: 66
End: 2021-02-25
Payer: OTHER MISCELLANEOUS

## 2021-02-25 VITALS
BODY MASS INDEX: 35.68 KG/M2 | SYSTOLIC BLOOD PRESSURE: 152 MMHG | DIASTOLIC BLOOD PRESSURE: 77 MMHG | HEART RATE: 57 BPM | HEIGHT: 75 IN | WEIGHT: 287 LBS

## 2021-02-25 DIAGNOSIS — S62.101A CLOSED FRACTURE OF RIGHT WRIST, INITIAL ENCOUNTER: Primary | ICD-10-CM

## 2021-02-25 PROCEDURE — 29125 APPL SHORT ARM SPLINT STATIC: CPT | Performed by: ORTHOPAEDIC SURGERY

## 2021-02-25 PROCEDURE — 99214 OFFICE O/P EST MOD 30 MIN: CPT | Performed by: ORTHOPAEDIC SURGERY

## 2021-02-25 NOTE — H&P (VIEW-ONLY)
Chief Complaint     Right wrist fracture      History of Present Illness     Brina Fagan is a 63 y o  right hand dominant male  Presenting for evaluation regarding his right wrist fracture sustained 02/22/2021 when he slipped on ice while walking into work  He was treated in the ED for a distal radius fracture and had this reduced  He was placed in a splint and referred to hand surgery  He does not have history of injury to this right wrist although he did have right rotator cuff repair done about a year ago  He states he also had carpal tunnel but describes a numbness and tingling to the ring and small fingers  He states he has had this for years prior to this injury  He had an increase of this numbness and tingling prior to the fracture reduction and notes that this is overall resolved post reduction  Patient has been maintaining finger motion  He has been taking hydrocodone for pain  Patient is a smoker, states he smokes 2-3 cigarettes per day  Patient is currently working as maintenance  This is a worker's compensation case  Past Medical History:   Diagnosis Date    Chronic kidney disease     Coronary artery disease     GERD (gastroesophageal reflux disease)     H/O heart artery stent     [facility] [address] [facility]     Hyperlipidemia     Hypertension     [facility]myocardial infarction) [facility] [address] [facility]     LUCIO (obstructive sleep apnea)     Tobacco use        Past Surgical History:   Procedure Laterality Date    BACK SURGERY  2012    CARDIAC CATHETERIZATION  06/04/2004    PTCA and Cypher stent placement to the RCA(p)   CARDIAC CATHETERIZATION  09/16/2005    LAD(p) 20-30%  Dx 50% ostial  LCx 50%  OM and PLB 50%  RCA 40% ISR  No intervention  EF 60%   CARDIAC SURGERY      HERNIA REPAIR      ROTATOR CUFF REPAIR         Allergies   Allergen Reactions    Ace Inhibitors Angioedema    Alprazolam Other (See Comments)     Other reaction(s):  Other: Document details in comments  SEVERE DISOREINTATION/CRAZY  SEVERE DISOREINTATION/CRAZY      Buspirone      Other reaction(s): Other (See Comments), Other: Document details in comments  SEVERE DISORIENTATION/CRAZY  SEVERE DISORIENTATION/CRAZY      Lorazepam      Other reaction(s): Other (See Comments), Other: Document details in comments  SEVERE DISORIENTATION/CRAZY  SEVERE DISORIENTATION/CRAZY         Current Outpatient Medications on File Prior to Visit   Medication Sig Dispense Refill    allopurinol (ZYLOPRIM) 300 mg tablet TAKE (1) TABLET BY MOUTH ONCE DAILY   amLODIPine (NORVASC) 5 mg tablet Take 1 tablet (5 mg total) by mouth daily 30 tablet 11    aspirin (ECOTRIN LOW STRENGTH) 81 mg EC tablet Take 81 mg by mouth daily after dinner Takes at 1800      co-enzyme Q-10 100 mg capsule Take 100 mg by mouth daily       ezetimibe (ZETIA) 10 mg tablet Take 10 mg by mouth daily at bedtime       fluticasone (FLONASE) 50 mcg/act nasal spray 2 sprays into each nostril every 12 (twelve) hours as needed (sinus infections)       HYDROcodone-acetaminophen (NORCO) 5-325 mg per tablet Take 1 tablet by mouth every 6 (six) hours as needed for pain for up to 20 dosesMax Daily Amount: 4 tablets 20 tablet 0    lansoprazole (PREVACID) 30 mg capsule TAKE 1 CAPSULE BY MOUTH ONCE DAILY   metoprolol tartrate (LOPRESSOR) 100 mg tablet TAKE 1 TABLET BY MOUTH TWICE DAILY   rosuvastatin (CRESTOR) 40 MG tablet TAKE 1 TABLET BY MOUTH ONCE DAILY IN THE EVENING   torsemide (DEMADEX) 10 mg tablet TAKE (1) TABLET BY MOUTH ONCE DAILY  No current facility-administered medications on file prior to visit          Social History     Tobacco Use    Smoking status: Former Smoker     Packs/day: 0 50     Types: Cigarettes    Smokeless tobacco: Never Used   Substance Use Topics    Alcohol use: Yes     Frequency: Monthly or less    Drug use: Never       Family History   Problem Relation Age of Onset    Hypertension Mother    Julieann Frankel Arthritis Mother    Julieann Frankel Hypertension Father     Hyperlipidemia Father     Hypertension Sister     Hyperlipidemia Sister     Hyperlipidemia Brother     Diabetes Paternal Grandmother        Review of Systems     As stated in the HPI  All other systems were reviewed and are negative  Physical Exam     /77   Pulse 57   Ht 6' 3" (1 905 m)   Wt 130 kg (287 lb)   BMI 35 87 kg/m²     GENERAL: This is a well-developed, well-nourished, age-appropriate patient in no acute distress  The patient is alert and oriented x3  Pleasant and cooperative  Eyes: Anicteric sclerae  Extraocular movements appear intact  HENT: Nares are patent with no drainage  Lungs: There is equal chest rise on inspection  Breathing is non-labored with no audible wheezing  Cardiovascular: No cyanosis  No upper extremity lymphadema  Skin: Skin is warm to touch  No obvious skin lesions or rashes other than described below  Neurologic: No ataxia  Psychiatric: Mood and affect are appropriate  Right wrist:  No significant deformity noted  Minimal swelling about the wrist, no ecchymosis or erythema  Tender distal radius,  No tenderness at the elbow   Mildly elevated DPC  5/5 Motor to the APB, FDI, FDP2, FDP5, EDC  Sensation intact to light touch in the median, radial, and ulnar nerve distribution  Brisk capillary refill noted all digits   Palpable distal radial pulse    Data Review     Results Reviewed     None             Imaging:  Pre and post reduction xrays of the right wrist were personally reviewed by me and demonstrate improved alignment and angulation of distal radius fracture with ulnar styloid  Base fracture    Assessment and Plan      Diagnoses and all orders for this visit:    Closed fracture of right wrist, initial encounter  -     CT upper extremity wo contrast right; Future         63year old male with right distal radius and ulnar styloid fractures   Discussed that non-operative management of this fracture will have a very low success rate as this is a very unstable fracture with significant intra-articular injury despite the  Improved alignment on reduction  For this reason I strongly recommended surgical management in the form of closed reduction internal fixation  Explained to the patient that this would be best done in a 2 stage process with a dorsal bridge plate and subsequent removal  We can consider returning to work in some capacity in between but we will discuss this at a later date  Patient is a light smoker and was counseled on the risks of this in regards to healing and infection  Patient reports a heart attack in 2004 but states he is able to do light exertional activity including walking up a flight of stairs without significant shortness of breath and no chest pain  I would like to obtain a CT prior to surgery for planning purposes  This was ordered STAT today  Patient was placed in a new volar resting splint today  Follow Up: post-op    To Do Next Visit: sutures out    PROCEDURES PERFORMED:  Splint application    Date/Time: 2/25/2021 2:53 PM  Performed by: Puneet Zapata MD  Authorized by: Puneet Zapata MD   Universal Protocol:  Consent: Verbal consent obtained  Risks and benefits: risks, benefits and alternatives were discussed  Consent given by: patient  Timeout called at: 2/25/2021 2:53 PM     Pre-procedure details:     Sensation:  Normal  Procedure details:     Laterality:  Right    Location:  Wrist    Splint type:  Volar short arm    Supplies:  Elastic bandage, fiberglass and cotton padding  Post-procedure details:     Pain:  Unchanged    Sensation:  Normal    Patient tolerance of procedure:   Tolerated well, no immediate complications           Scribe Attestation    I,:  Ginny Kincaid MA am acting as a scribe while in the presence of the attending physician :       I,:  Puneet Zapata MD personally performed the services described in this documentation    as scribed in my presence :

## 2021-02-25 NOTE — PROGRESS NOTES
Chief Complaint     Right wrist fracture      History of Present Illness     Mara Hodgson is a 63 y o  right hand dominant male  Presenting for evaluation regarding his right wrist fracture sustained 02/22/2021 when he slipped on ice while walking into work  He was treated in the ED for a distal radius fracture and had this reduced  He was placed in a splint and referred to hand surgery  He does not have history of injury to this right wrist although he did have right rotator cuff repair done about a year ago  He states he also had carpal tunnel but describes a numbness and tingling to the ring and small fingers  He states he has had this for years prior to this injury  He had an increase of this numbness and tingling prior to the fracture reduction and notes that this is overall resolved post reduction  Patient has been maintaining finger motion  He has been taking hydrocodone for pain  Patient is a smoker, states he smokes 2-3 cigarettes per day  Patient is currently working as maintenance  This is a worker's compensation case  Past Medical History:   Diagnosis Date    Chronic kidney disease     Coronary artery disease     GERD (gastroesophageal reflux disease)     H/O heart artery stent     [facility] attack [facility] [address]     Hyperlipidemia     Hypertension     MI [facility]ocardial infarction) [facility] [address] [facility]     LUCIO (obstructive sleep apnea)     Tobacco use        Past Surgical History:   Procedure Laterality Date    BACK SURGERY  2012    CARDIAC CATHETERIZATION  06/04/2004    PTCA and Cypher stent placement to the RCA(p)   CARDIAC CATHETERIZATION  09/16/2005    LAD(p) 20-30%  Dx 50% ostial  LCx 50%  OM and PLB 50%  RCA 40% ISR  No intervention  EF 60%   CARDIAC SURGERY      HERNIA REPAIR      ROTATOR CUFF REPAIR         Allergies   Allergen Reactions    Ace Inhibitors Angioedema    Alprazolam Other (See Comments)     Other reaction(s):  Other: Document details in comments  SEVERE DISOREINTATION/CRAZY  SEVERE DISOREINTATION/CRAZY      Buspirone      Other reaction(s): Other (See Comments), Other: Document details in comments  SEVERE DISORIENTATION/CRAZY  SEVERE DISORIENTATION/CRAZY      Lorazepam      Other reaction(s): Other (See Comments), Other: Document details in comments  SEVERE DISORIENTATION/CRAZY  SEVERE DISORIENTATION/CRAZY         Current Outpatient Medications on File Prior to Visit   Medication Sig Dispense Refill    allopurinol (ZYLOPRIM) 300 mg tablet TAKE (1) TABLET BY MOUTH ONCE DAILY   amLODIPine (NORVASC) 5 mg tablet Take 1 tablet (5 mg total) by mouth daily 30 tablet 11    aspirin (ECOTRIN LOW STRENGTH) 81 mg EC tablet Take 81 mg by mouth daily after dinner Takes at 1800      co-enzyme Q-10 100 mg capsule Take 100 mg by mouth daily       ezetimibe (ZETIA) 10 mg tablet Take 10 mg by mouth daily at bedtime       fluticasone (FLONASE) 50 mcg/act nasal spray 2 sprays into each nostril every 12 (twelve) hours as needed (sinus infections)       HYDROcodone-acetaminophen (NORCO) 5-325 mg per tablet Take 1 tablet by mouth every 6 (six) hours as needed for pain for up to 20 dosesMax Daily Amount: 4 tablets 20 tablet 0    lansoprazole (PREVACID) 30 mg capsule TAKE 1 CAPSULE BY MOUTH ONCE DAILY   metoprolol tartrate (LOPRESSOR) 100 mg tablet TAKE 1 TABLET BY MOUTH TWICE DAILY   rosuvastatin (CRESTOR) 40 MG tablet TAKE 1 TABLET BY MOUTH ONCE DAILY IN THE EVENING   torsemide (DEMADEX) 10 mg tablet TAKE (1) TABLET BY MOUTH ONCE DAILY  No current facility-administered medications on file prior to visit          Social History     Tobacco Use    Smoking status: Former Smoker     Packs/day: 0 50     Types: Cigarettes    Smokeless tobacco: Never Used   Substance Use Topics    Alcohol use: Yes     Frequency: Monthly or less    Drug use: Never       Family History   Problem Relation Age of Onset    Hypertension Mother    [facility] Arthritis Mother    [facility] Hypertension Father     Hyperlipidemia Father     Hypertension Sister     Hyperlipidemia Sister     Hyperlipidemia Brother     Diabetes Paternal Grandmother        Review of Systems     As stated in the HPI  All other systems were reviewed and are negative  Physical Exam     /77   Pulse 57   Ht 6' 3" (1 905 m)   Wt 130 kg (287 lb)   BMI 35 87 kg/m²     GENERAL: This is a well-developed, well-nourished, age-appropriate patient in no acute distress  The patient is alert and oriented x3  Pleasant and cooperative  Eyes: Anicteric sclerae  Extraocular movements appear intact  HENT: Nares are patent with no drainage  Lungs: There is equal chest rise on inspection  Breathing is non-labored with no audible wheezing  Cardiovascular: No cyanosis  No upper extremity lymphadema  Skin: Skin is warm to touch  No obvious skin lesions or rashes other than described below  Neurologic: No ataxia  Psychiatric: Mood and affect are appropriate  Right wrist:  No significant deformity noted  Minimal swelling about the wrist, no ecchymosis or erythema  Tender distal radius,  No tenderness at the elbow   Mildly elevated DPC  5/5 Motor to the APB, FDI, FDP2, FDP5, EDC  Sensation intact to light touch in the median, radial, and ulnar nerve distribution  Brisk capillary refill noted all digits   Palpable distal radial pulse    Data Review     Results Reviewed     None             Imaging:  Pre and post reduction xrays of the right wrist were personally reviewed by me and demonstrate improved alignment and angulation of distal radius fracture with ulnar styloid  Base fracture    Assessment and Plan      Diagnoses and all orders for this visit:    Closed fracture of right wrist, initial encounter  -     CT upper extremity wo contrast right; Future         63year old male with right distal radius and ulnar styloid fractures   Discussed that non-operative management of this fracture will have a very low success rate as this is a very unstable fracture with significant intra-articular injury despite the  Improved alignment on reduction  For this reason I strongly recommended surgical management in the form of closed reduction internal fixation  Explained to the patient that this would be best done in a 2 stage process with a dorsal bridge plate and subsequent removal  We can consider returning to work in some capacity in between but we will discuss this at a later date  Patient is a light smoker and was counseled on the risks of this in regards to healing and infection  Patient reports a heart attack in 2004 but states he is able to do light exertional activity including walking up a flight of stairs without significant shortness of breath and no chest pain  I would like to obtain a CT prior to surgery for planning purposes  This was ordered STAT today  Patient was placed in a new volar resting splint today  Follow Up: post-op    To Do Next Visit: sutures out    PROCEDURES PERFORMED:  Splint application    Date/Time: 2/25/2021 2:53 PM  Performed by: Abimbola Alvarez MD  Authorized by: Abimbola Alvarez MD   Universal Protocol:  Consent: Verbal consent obtained  Risks and benefits: risks, benefits and alternatives were discussed  Consent given by: patient  Timeout called at: 2/25/2021 2:53 PM     Pre-procedure details:     Sensation:  Normal  Procedure details:     Laterality:  Right    Location:  Wrist    Splint type:  Volar short arm    Supplies:  Elastic bandage, fiberglass and cotton padding  Post-procedure details:     Pain:  Unchanged    Sensation:  Normal    Patient tolerance of procedure:   Tolerated well, no immediate complications           Scribe Attestation    I,:  Roverto Masters MA am acting as a scribe while in the presence of the attending physician :       I,:  Abimbola Alvarez MD personally performed the services described in this documentation    as scribed in my presence :

## 2021-02-26 ENCOUNTER — ANESTHESIA EVENT (OUTPATIENT)
Dept: PERIOP | Facility: HOSPITAL | Age: 66
End: 2021-02-26
Payer: OTHER MISCELLANEOUS

## 2021-02-26 ENCOUNTER — HOSPITAL ENCOUNTER (OUTPATIENT)
Dept: CT IMAGING | Facility: HOSPITAL | Age: 66
Discharge: HOME | End: 2021-02-26
Attending: ORTHOPAEDIC SURGERY
Payer: OTHER MISCELLANEOUS

## 2021-02-26 DIAGNOSIS — S62.101A CLOSED FRACTURE OF RIGHT WRIST, INITIAL ENCOUNTER: ICD-10-CM

## 2021-02-26 PROCEDURE — 73200 CT UPPER EXTREMITY W/O DYE: CPT

## 2021-02-26 NOTE — PRE-PROCEDURE INSTRUCTIONS
Pre-Surgery Instructions:   Medication Instructions    allopurinol (ZYLOPRIM) 300 mg tablet Instructed to take per normal schedule including DOS with sips water    amLODIPine (NORVASC) 5 mg tablet Instructed to take per normal schedule including DOS with sips water    co-enzyme Q-10 100 mg capsule Instructed to avoid all ASA/NSAIDs and OTC Vit/Supp from now until after surgery  Tylenol ok prn    ezetimibe (ZETIA) 10 mg tablet Instructed to take per normal schedule including DOS with sips water    fluticasone (FLONASE) 50 mcg/act nasal spray Instructed to take as needed including DOS    HYDROcodone-acetaminophen (NORCO) 5-325 mg per tablet Instructed to take as needed including DOS    lansoprazole (PREVACID) 30 mg capsule Instructed to take per normal schedule including DOS with sips water    metoprolol tartrate (LOPRESSOR) 100 mg tablet Instructed to take per normal schedule including DOS with sips water    rosuvastatin (CRESTOR) 40 MG tablet Instructed to take per normal schedule including DOS with sips water    torsemide (DEMADEX) 10 mg tablet Instructed to take per normal schedule except DOS    Pre op,medications and showering instructions reviewed-Patient has hibiclens  Pt is on Baby ASA but was not instructed when to stop  Message sent to [facility] and will call Pt  Back with instructions  Instructed to avoid all NSAIDs and OTC Vit/Supp from now until after surgery  Tylenol ok prn  Pt  Verbalized an understanding of all instructions reviewed and offers no concerns at this time    Pt  Verbalized understanding of current visitor restrictions

## 2021-02-28 NOTE — ANESTHESIA PREPROCEDURE EVALUATION
Procedure:  OPEN REDUCTION W/ INTERNAL FIXATION (ORIF) RADIUS / ULNA (WRIST) (Right Wrist)    Relevant Problems   CARDIO   (+) Coronary artery disease involving native coronary artery of native heart without angina pectoris   (+) HTN (hypertension) (lopressor taken this am)   (+) Hyperlipidemia      /RENAL   (+) Chronic kidney disease (CKD), stage III (moderate)      NEURO/PSYCH   (+) H/O acute myocardial infarction      PULMONARY   (+) Sleep apnea      Other   (+) S/P angioplasty with stent (2004)         LEFT VENTRICLE: Size was normal  Systolic function was normal  Ejection fraction was estimated in the range of 55 % to 60 %  There were no regional wall motion abnormalities  Wall thickness was at the upper limits of normal  No evidence of  apical thrombus  DOPPLER: Doppler parameters were consistent with abnormal left ventricular relaxation (grade 1 diastolic dysfunction)      RIGHT VENTRICLE: The size was normal  Systolic function was normal  Wall thickness was normal      LEFT ATRIUM: The atrium was mildly dilated      RIGHT ATRIUM: Size was at the upper limits of normal      MITRAL VALVE: Valve structure was normal  There was normal leaflet separation  DOPPLER: The transmitral velocity was within the normal range  There was no evidence for stenosis  There was trace regurgitation      AORTIC VALVE: Leaflets exhibited sclerosis  DOPPLER: Transaortic velocity was within the normal range  There was no evidence for stenosis  There was no significant regurgitation      TRICUSPID VALVE: The valve structure was normal  There was normal leaflet separation  DOPPLER: The transtricuspid velocity was within the normal range  There was no evidence for stenosis  There was trace to mild regurgitation      PULMONIC VALVE: Not well visualized  DOPPLER: The transpulmonic velocity was within the normal range  There was no significant regurgitation      PERICARDIUM: There was no pericardial effusion   The pericardium was normal in appearance      AORTA: The root exhibited normal size       Anesthesia Plan  ASA Score- 3     Anesthesia Type- general and regional with ASA Monitors  Additional Monitors:   Airway Plan: LMA  Comment: Right Supraclavicular block  Plan Factors-    Chart reviewed  EKG reviewed  Obstructive sleep apnea risk education given perioperatively  Induction- intravenous  Postoperative Plan-     Informed Consent- Anesthetic plan and risks discussed with patient  I personally reviewed this patient with the CRNA  Discussed and agreed on the Anesthesia Plan with the CRNA  Destiny Reynoso

## 2021-03-01 ENCOUNTER — APPOINTMENT (OUTPATIENT)
Dept: RADIOLOGY | Facility: HOSPITAL | Age: 66
End: 2021-03-01
Payer: OTHER MISCELLANEOUS

## 2021-03-01 ENCOUNTER — HOSPITAL ENCOUNTER (OUTPATIENT)
Facility: HOSPITAL | Age: 66
Setting detail: OUTPATIENT SURGERY
Discharge: HOME | End: 2021-03-01
Attending: ORTHOPAEDIC SURGERY | Admitting: ORTHOPAEDIC SURGERY
Payer: OTHER MISCELLANEOUS

## 2021-03-01 ENCOUNTER — ANESTHESIA (OUTPATIENT)
Dept: PERIOP | Facility: HOSPITAL | Age: 66
End: 2021-03-01
Payer: OTHER MISCELLANEOUS

## 2021-03-01 VITALS
WEIGHT: 275 LBS | HEART RATE: 59 BPM | SYSTOLIC BLOOD PRESSURE: 170 MMHG | RESPIRATION RATE: 18 BRPM | TEMPERATURE: 97.3 F | HEIGHT: 75 IN | OXYGEN SATURATION: 97 % | DIASTOLIC BLOOD PRESSURE: 81 MMHG | BODY MASS INDEX: 34.19 KG/M2

## 2021-03-01 DIAGNOSIS — Z87.81 S/P ORIF (OPEN REDUCTION INTERNAL FIXATION) FRACTURE: Primary | ICD-10-CM

## 2021-03-01 DIAGNOSIS — Z98.890 S/P ORIF (OPEN REDUCTION INTERNAL FIXATION) FRACTURE: Primary | ICD-10-CM

## 2021-03-01 PROCEDURE — C1713 ANCHOR/SCREW BN/BN,TIS/BN: HCPCS | Performed by: ORTHOPAEDIC SURGERY

## 2021-03-01 PROCEDURE — 73110 X-RAY EXAM OF WRIST: CPT

## 2021-03-01 PROCEDURE — 25609 OPTX DST RD XART FX/EP SEP3+: CPT | Performed by: ORTHOPAEDIC SURGERY

## 2021-03-01 PROCEDURE — 73110 X-RAY EXAM OF WRIST: CPT | Performed by: ORTHOPAEDIC SURGERY

## 2021-03-01 DEVICE — 2.5 CORTICAL SCREW 16MM, HD7, 5/PKG
Type: IMPLANTABLE DEVICE | Site: WRIST | Status: FUNCTIONAL
Brand: APTUS

## 2021-03-01 DEVICE — 2.5 CORTICAL SCREW 18MM, HD7, 5/PKG
Type: IMPLANTABLE DEVICE | Site: WRIST | Status: FUNCTIONAL
Brand: APTUS

## 2021-03-01 DEVICE — 2.5 ADAPTIVE II TRILOCK DISTRAD.PL VOL R
Type: IMPLANTABLE DEVICE | Site: WRIST | Status: FUNCTIONAL
Brand: APTUS

## 2021-03-01 DEVICE — 2.5 TRILOCK SCREW 22MM, HD7, 1/PKG
Type: IMPLANTABLE DEVICE | Site: WRIST | Status: FUNCTIONAL
Brand: APTUS

## 2021-03-01 DEVICE — 2.5 TRILOCK SCREW 20MM, HD7, 1/PKG
Type: IMPLANTABLE DEVICE | Site: WRIST | Status: FUNCTIONAL
Brand: APTUS

## 2021-03-01 RX ORDER — PROPOFOL 10 MG/ML
INJECTION, EMULSION INTRAVENOUS AS NEEDED
Status: DISCONTINUED | OUTPATIENT
Start: 2021-03-01 | End: 2021-03-01

## 2021-03-01 RX ORDER — MAGNESIUM HYDROXIDE 1200 MG/15ML
LIQUID ORAL AS NEEDED
Status: DISCONTINUED | OUTPATIENT
Start: 2021-03-01 | End: 2021-03-01 | Stop reason: HOSPADM

## 2021-03-01 RX ORDER — DEXMEDETOMIDINE HYDROCHLORIDE 100 UG/ML
INJECTION, SOLUTION INTRAVENOUS AS NEEDED
Status: DISCONTINUED | OUTPATIENT
Start: 2021-03-01 | End: 2021-03-01

## 2021-03-01 RX ORDER — FENTANYL CITRATE/PF 50 MCG/ML
25 SYRINGE (ML) INJECTION
Status: COMPLETED | OUTPATIENT
Start: 2021-03-01 | End: 2021-03-01

## 2021-03-01 RX ORDER — ONDANSETRON 2 MG/ML
INJECTION INTRAMUSCULAR; INTRAVENOUS AS NEEDED
Status: DISCONTINUED | OUTPATIENT
Start: 2021-03-01 | End: 2021-03-01

## 2021-03-01 RX ORDER — ROPIVACAINE HYDROCHLORIDE 5 MG/ML
INJECTION, SOLUTION EPIDURAL; INFILTRATION; PERINEURAL
Status: COMPLETED | OUTPATIENT
Start: 2021-03-01 | End: 2021-03-01

## 2021-03-01 RX ORDER — OXYCODONE HYDROCHLORIDE 5 MG/1
5 TABLET ORAL EVERY 6 HOURS PRN
Qty: 20 TABLET | Refills: 0 | Status: SHIPPED | OUTPATIENT
Start: 2021-03-01 | End: 2022-05-06 | Stop reason: ALTCHOICE

## 2021-03-01 RX ORDER — HYDROMORPHONE HCL/PF 1 MG/ML
0.5 SYRINGE (ML) INJECTION
Status: DISCONTINUED | OUTPATIENT
Start: 2021-03-01 | End: 2021-03-01 | Stop reason: HOSPADM

## 2021-03-01 RX ORDER — OXYCODONE HYDROCHLORIDE 5 MG/1
5 TABLET ORAL EVERY 6 HOURS PRN
Status: DISCONTINUED | OUTPATIENT
Start: 2021-03-01 | End: 2021-03-01 | Stop reason: HOSPADM

## 2021-03-01 RX ORDER — FENTANYL CITRATE 50 UG/ML
INJECTION, SOLUTION INTRAMUSCULAR; INTRAVENOUS AS NEEDED
Status: DISCONTINUED | OUTPATIENT
Start: 2021-03-01 | End: 2021-03-01

## 2021-03-01 RX ORDER — LIDOCAINE HYDROCHLORIDE 10 MG/ML
INJECTION, SOLUTION EPIDURAL; INFILTRATION; INTRACAUDAL; PERINEURAL AS NEEDED
Status: DISCONTINUED | OUTPATIENT
Start: 2021-03-01 | End: 2021-03-01

## 2021-03-01 RX ORDER — GLYCOPYRROLATE 0.2 MG/ML
INJECTION INTRAMUSCULAR; INTRAVENOUS AS NEEDED
Status: DISCONTINUED | OUTPATIENT
Start: 2021-03-01 | End: 2021-03-01

## 2021-03-01 RX ORDER — EPHEDRINE SULFATE 50 MG/ML
INJECTION INTRAVENOUS AS NEEDED
Status: DISCONTINUED | OUTPATIENT
Start: 2021-03-01 | End: 2021-03-01

## 2021-03-01 RX ORDER — DIPHENHYDRAMINE HYDROCHLORIDE 50 MG/ML
12.5 INJECTION INTRAMUSCULAR; INTRAVENOUS ONCE AS NEEDED
Status: DISCONTINUED | OUTPATIENT
Start: 2021-03-01 | End: 2021-03-01 | Stop reason: HOSPADM

## 2021-03-01 RX ORDER — SODIUM CHLORIDE, SODIUM LACTATE, POTASSIUM CHLORIDE, CALCIUM CHLORIDE 600; 310; 30; 20 MG/100ML; MG/100ML; MG/100ML; MG/100ML
125 INJECTION, SOLUTION INTRAVENOUS CONTINUOUS
Status: DISCONTINUED | OUTPATIENT
Start: 2021-03-01 | End: 2021-03-01 | Stop reason: HOSPADM

## 2021-03-01 RX ORDER — DEXAMETHASONE SODIUM PHOSPHATE 10 MG/ML
INJECTION, SOLUTION INTRAMUSCULAR; INTRAVENOUS AS NEEDED
Status: DISCONTINUED | OUTPATIENT
Start: 2021-03-01 | End: 2021-03-01

## 2021-03-01 RX ADMIN — SODIUM CHLORIDE, SODIUM LACTATE, POTASSIUM CHLORIDE, AND CALCIUM CHLORIDE 125 ML/HR: .6; .31; .03; .02 INJECTION, SOLUTION INTRAVENOUS at 15:16

## 2021-03-01 RX ADMIN — GLYCOPYRROLATE 0.2 MG: 0.2 INJECTION, SOLUTION INTRAMUSCULAR; INTRAVENOUS at 13:50

## 2021-03-01 RX ADMIN — FENTANYL CITRATE 25 MCG: 50 INJECTION, SOLUTION INTRAMUSCULAR; INTRAVENOUS at 15:28

## 2021-03-01 RX ADMIN — DEXMEDETOMIDINE HYDROCHLORIDE 8 MCG: 100 INJECTION, SOLUTION INTRAVENOUS at 13:44

## 2021-03-01 RX ADMIN — ROPIVACAINE HYDROCHLORIDE 25 ML: 5 INJECTION, SOLUTION EPIDURAL; INFILTRATION; PERINEURAL at 13:01

## 2021-03-01 RX ADMIN — SODIUM CHLORIDE, SODIUM LACTATE, POTASSIUM CHLORIDE, AND CALCIUM CHLORIDE: .6; .31; .03; .02 INJECTION, SOLUTION INTRAVENOUS at 12:36

## 2021-03-01 RX ADMIN — FENTANYL CITRATE 50 MCG: 50 INJECTION INTRAMUSCULAR; INTRAVENOUS at 13:41

## 2021-03-01 RX ADMIN — FENTANYL CITRATE 50 MCG: 50 INJECTION INTRAMUSCULAR; INTRAVENOUS at 12:45

## 2021-03-01 RX ADMIN — LIDOCAINE HYDROCHLORIDE 30 MG: 10 INJECTION, SOLUTION EPIDURAL; INFILTRATION; INTRACAUDAL; PERINEURAL at 13:15

## 2021-03-01 RX ADMIN — ONDANSETRON HYDROCHLORIDE 4 MG: 2 INJECTION, SOLUTION INTRAMUSCULAR; INTRAVENOUS at 13:15

## 2021-03-01 RX ADMIN — DEXMEDETOMIDINE HYDROCHLORIDE 12 MCG: 100 INJECTION, SOLUTION INTRAVENOUS at 13:18

## 2021-03-01 RX ADMIN — HYDROMORPHONE HYDROCHLORIDE 0.5 MG: 1 INJECTION, SOLUTION INTRAMUSCULAR; INTRAVENOUS; SUBCUTANEOUS at 15:33

## 2021-03-01 RX ADMIN — DEXMEDETOMIDINE HYDROCHLORIDE 12 MCG: 100 INJECTION, SOLUTION INTRAVENOUS at 14:45

## 2021-03-01 RX ADMIN — DEXAMETHASONE SODIUM PHOSPHATE 4 MG: 10 INJECTION, SOLUTION INTRAMUSCULAR; INTRAVENOUS at 13:15

## 2021-03-01 RX ADMIN — EPHEDRINE SULFATE 10 MG: 50 INJECTION, SOLUTION INTRAVENOUS at 13:28

## 2021-03-01 RX ADMIN — CEFAZOLIN SODIUM 3000 MG: 1 POWDER, FOR SOLUTION INTRAMUSCULAR; INTRAVENOUS at 13:04

## 2021-03-01 RX ADMIN — PROPOFOL 200 MG: 10 INJECTION, EMULSION INTRAVENOUS at 13:15

## 2021-03-01 RX ADMIN — FENTANYL CITRATE 25 MCG: 50 INJECTION, SOLUTION INTRAMUSCULAR; INTRAVENOUS at 15:18

## 2021-03-01 RX ADMIN — EPHEDRINE SULFATE 10 MG: 50 INJECTION, SOLUTION INTRAVENOUS at 13:23

## 2021-03-01 RX ADMIN — FENTANYL CITRATE 25 MCG: 50 INJECTION, SOLUTION INTRAMUSCULAR; INTRAVENOUS at 15:23

## 2021-03-01 RX ADMIN — FENTANYL CITRATE 50 MCG: 50 INJECTION INTRAMUSCULAR; INTRAVENOUS at 13:36

## 2021-03-01 RX ADMIN — FENTANYL CITRATE 50 MCG: 50 INJECTION INTRAMUSCULAR; INTRAVENOUS at 12:56

## 2021-03-01 RX ADMIN — FENTANYL CITRATE 25 MCG: 50 INJECTION, SOLUTION INTRAMUSCULAR; INTRAVENOUS at 15:13

## 2021-03-01 NOTE — OP NOTE
DATE OF SURGERY: 3/1/2021    SURGEON: Tanya Wilkins MD    PREOPERATIVE DIAGNOSIS:   Right intra-articular distal radius fracture    POSTOPERATIVE DIAGNOSIS:  same    PROCEDURE:  Open reduction internal fixation right distal radius fracture intra-articular greater than 3 parts    IMPLANTS:   Implant Name Type Inv  Item Serial No   Lot No  LRB No  Used Action   PLATE 7 2OG TRILOCK DSTL RADIUS VOLAR WIDE 13HL RT - [id number] Plate PLATE 4 6BJ TRILOCK DSTL RADIUS VOLAR WIDE 13HL RT  [employer]ART[employer]  Right 1 Implanted   SCREW TRILOCK 2 5 X 20MM HEXADRIVE 7 - [id number] Screw SCREW TRILOCK 2 5 X 20MM HEXADRIVE 7  [employer]ART[employer]  Right 6 Implanted   SCREW TRILOCK 2 5 X 22MM HEXADRIVE 7 - [id number] Screw SCREW TRILOCK 2 5 X 22MM HEXADRIVE 7  [employer]ARTIS  Right 1 Implanted   SCREW RIVKA 2 5 X 18MM HEXADRIVE 7 - [id number] Screw SCREW RIVKA 2 5 X 18MM HEXADRIVE 7  [employer]ARTIS  Right 2 Implanted   SCREW RIVKA 2 5 X 16MM HEXADRIVE 7 - [id number] Screw SCREW RIVKA 2 5 X 16MM HEXADRIVE 7  MEDARTIS  Right 1 Implanted        ASSISTANTS:     * Cristal Parrish MD - Assisting     * Marcia Mandujano PA-C - Assisting       ANESTHESIA: General w/ Regional    ESTIMATED BLOOD LOSS: Minimal     INTRAVENOUS FLUIDS: Per anesthesia    URINE OUTPUT:  none    TOURNIQUET TIME:  About 60 minutes    COMPLICATIONS:  none    ANTIBIOTICS:  3 g Ancef    SPECIMENS: * No specimens in log *         INDICATIONS: Cynthia Mercedes is a 63y o  year old male who sustained the above conditions  The indications for operative intervention were  A displaced intra-articular distal radius fracture  The alternatives to operative intervention included  nonoperative care  The risks of the operative procedure were discussed in detail with the patient and  His wife including but not limited to the risks of anesthesia, infection, injury to blood vessel/nerve, pain, malunion, nonunion, painful implants, stiffness, changes in sensation, and the need for repeat surgery   The patient understood these risks and alternatives and elected to proceed with surgery  DESCRIPTION OF PROCEDURE: The patient was seen in the preoperative holding area and identification was performed as per the institution's protocol  The patient was then brought to the operating room, a briefing was held and prophylactic antibiotics  Were given  Anesthesia was induced  A tourniquet cuff was applied to the operative extremity, set at  250 mmHg The site was pre-scrubbed with chlorhexidine and prepped with  ChloraPrep and draped in the usual sterile fashion  Following a timeout procedure, incision was made overlying the flexor carpi radialis tendon  Dissection was carried down through skin and subcutaneous tissue taking care to cauterize any crossing veins  Dissection was carried into the sub sheath of the FCR was and the tendon was retracted ulnarly  The sub sheath was incised taking care to avoid the palmar cutaneous branch of the median nerve  The flexor pollicis longus tendon was swept ulnarly as well revealing the pronator quadratus  This was incised transversely at the transitional fibrous zone and then longitudinally along the radial border of the radius  The fracture was then exposed  Hematoma was cleared from the fracture  Using manual reduction, the fracture was reduced  We had a bit of difficulty mobilizing the radial sided fracture, so brachial radialis tenotomy was performed  We then placed a Maci wire down through the radial styloid and from the radial shaft into the ulnar corner  This provided provisional fixation  A volar locking plate was then coapted to the fracture and a nonlocking screw was drilled into the oblong hole  Biplanar fluoroscopy was used to set the appropriate orientation of the plate and small adjustments were made as necessary  Once we were satisfied, a nonlocking screw was then drilled into the distal row to help coapt the plate to the distal fragment perfectly  This also transfixed the intra-articular splits  We then filled the remaining screws in the distal row with locking screws taking care to not place them bicortically and ensuring at least 2 good locking screws in the ulnar corner  The nonlocking screw was replaced with a locking screw in the distal row  wires were removed  Two locking kickstand screws were then placed in the second distal row  Two further nonlocking screws were then placed proximally  Biplanar fluoroscopic views in addition to a skyline view showed no evidence of joint penetration with the distal row screws and the length of all screws was appropriate  The wrist was then ranged in flexion, extension, pronation, and supination with no crepitance or instability noted  The DRUJ was stressed and was stable  The wounds were copiously irrigated and closed in layers including   Three-0 Monocryl and 3-0 nylon in the skin  Sterile dressing was applied as well as a volar resting splint  All sharps and sponge counts were correct and there were no complications  I attest that I, Christin Stevenson, was present and scrubbed for the entirety of the procedure  A physician assistant was required during the procedure for retraction, tissue handling, dissection and suturing  The patient was awoken from anesthesia in good condition and taken to the PACU  PLAN: The patient will   Follow up in 10-14 days  And three-view x-rays of the right wrist will be taken out of splint

## 2021-03-01 NOTE — NURSING NOTE
Pt returned to APU awake,alert,IV infusing,mild operative pain, not requiring pain medication at this time, S/O at bedside  Taking po

## 2021-03-01 NOTE — NURSING NOTE
Pt is awake,alert,tolerated diet, assisted OOB to BR, voided QS  Written and verbal instructions reviewed with patient and wife, who verbalize an understanding and voices no questions or complaints  Sling applied for support, due to block  Exposed fingers are pink and warm  With (+) movement

## 2021-03-01 NOTE — DISCHARGE INSTRUCTIONS
[facility] - Dr Will Solorzano (Orthopedic Surgery)    Follow-up Appointments   Please call to set up/confirm your first postoperative visit with Dr Rowena Mendoza in 10-14 days      Dressing and [address] A dressing has been placed on your hand/arm to keep the incisions clean  Keep your dressing clean and dry  o Do not remove the surgical dressing/splint  It will be removed at your first postoperative office visit with the doctor or therapist     Hailey Noe a plastic bag over your dressing/splint whenever you take a shower or bath until you are allowed to remove it   Swelling is normal after surgery  Elevate your hand/arm so the surgical site is above your heart to decrease the swelling  Swelling is like water, it runs downhill  This is especially important for the first 72 hours after surgery  o The best way to elevate your hand/arm is with your fingers pointing towards the ceiling and your hand/arm above the level of the heart   o You can use pillows to help prop your hand/arm up when sitting or lying down   If you are experiencing pain, be sure you are elevating your hand/arm as often as possible   Apply an ice pack over your dressing/splint for 20 minutes of every hour for the first 3 days when you are awake  This can help to reduce swelling and inflammation  Be sure the ice pack is waterproof so it does not leak on the dressing/splint  A simple ice pack can be made by adding ten cubes and a small amount of water in a small zip-lock bag  Seal this small bag tightly  Place this small bag in a larger zip lock bag  Apply to the area in pain   If the dressing feels too tight in spite of elevation, loosen the outer wrap but do not remove the entire dressing     If you have exposed pins/wires, take clean gauze or a cotton tip applicator (like a Q-tip), get it wet in clean warm water mixed with non-scented hand soap (like Blackfeet, [place], Dial, etc ), and gently wipe around the base of the pin where it comes through the skin once a day  Do not use water from a well to clean as this has bacteria in it and can contribute to infections  ACTIVITIES:  [facility] and straighten the parts of your hand, wrist, elbow, and shoulder that are not included in your surgical dressing or splint  Do this at least 6 times a day, as this will help decrease swelling and speed up your recovery  This includes your fingers  See the instructions listed below for finger motion  THIS IS VERY IMPORTANT FOR YOUR RECOVERY! POSTOPERATIVE CARE/CONCERNS:  Emaline Folds You may experience some temporary numbness in your fingers   You should have very little to no bleeding on your dressing   Notify the office (see contact info at bottom of page) for any of the following:  o Excessive pain not relieved by rest, elevation, and pain medications  o Feeling that the dressing is too tight in spite of adequately elevating hand/arm  o Active bleeding through the dressing  o Drainage from the wound site or pin sites  o Foul odor from the dressing/wound  o Temperature greater that 101? F or chills  o Blue or excessively cold fingertips  o Numbness of the fingertips that does not improve in spite of adequately elevating hand/arm    PAIN MEDICATION:   Pain is a normal part of the recovery after surgery  The pain medication provided to you will help to decrease the discomfort but will not completely eliminate the pain   A prescription for a narcotic pain medicine (oxycodone or hydrocodone) and anti-inflammatory (naproxen) were called in to your pharmacy  Please take the anti-inflammatory medication AND over-the-counter acetaminophen (Tylenol) regularly  The instructions will be listed on the bottles  The narcotic pain medicine should be used for pain that is not controlled by these other medications and only for the first few days after surgery   The narcotic is HIGHLY ADDICTIVE and has many side effects such as causing constipation, dizziness, confusion, decreased breathing and more  It is safe to take for a short period of time after surgery  It is almost never prescribed for longer than a few weeks and never after one month for elective surgeries   Do not take narcotic or anti-inflammatories on an empty stomach   It is illegal to drive while taking narcotic pain medication   Your pain should decrease over the first few days after surgery which will allow you to take less pain medicine, increase the time between doses of medication, or stop taking all pain medicine        OFFICE CONTACT NUMBERS   Please call [phone] with any questions about appointments or any medical concerns

## 2021-03-01 NOTE — ANESTHESIA PROCEDURE NOTES
Peripheral Block    Start time: 3/1/2021 1:55 PM  Reason for block: at surgeon's request and post-op pain management  Staffing  Anesthesiologist: Mario Alberto Clayton MD  Preanesthetic Checklist  Completed: patient identified, site marked, surgical consent, pre-op evaluation, timeout performed, IV checked, risks and benefits discussed and monitors and equipment checked  Peripheral Block  Patient position: supine  Prep: ChloraPrep  Patient monitoring: heart rate, cardiac monitor, continuous pulse ox and frequent blood pressure checks  Laterality: right  Injection technique: single-shot  Procedures: ultrasound guided, Ultrasound guidance required for the procedure to increase accuracy and safety of medication placement and decrease risk of complications  and nerve stimulator  Ultrasound permanent image savedropivacaine (NAROPIN) 0 5 % perineural infiltration, 25 mL  Needle  Needle type: Stimuplex   Needle gauge: 20G    Needle length: 10 cm  Needle localization: ultrasound guidance and nerve stimulator  Assessment  Injection assessment: incremental injection, local visualized surrounding nerve on ultrasound and negative aspiration for heme  Paresthesia pain: none  Heart rate change: no  Slow fractionated injection: no  Post-procedure:  site cleaned  patient tolerated the procedure well with no immediate complications

## 2021-03-02 ENCOUNTER — TELEPHONE (OUTPATIENT)
Dept: OBGYN CLINIC | Facility: MEDICAL CENTER | Age: 66
End: 2021-03-02

## 2021-03-02 ENCOUNTER — TELEPHONE (OUTPATIENT)
Dept: OBGYN CLINIC | Facility: HOSPITAL | Age: 66
End: 2021-03-02

## 2021-03-02 NOTE — TELEPHONE ENCOUNTER
Patient Sees Dr Matthew Quinonez    Patient's wife Mello Blanchard) called and wanted the work note faxed to: [phone]  She also wanted to let Dr Matthew Quinonez know that Robb Essex is doing fine       - [phone]

## 2021-03-02 NOTE — TELEPHONE ENCOUNTER
Called patient in regards to picking up a work note  Left message for patient to call office back regarding whether he would like to print it from his my chart, or have it faxed, or pick it up from office

## 2021-03-10 ENCOUNTER — TELEPHONE (OUTPATIENT)
Dept: OBGYN CLINIC | Facility: HOSPITAL | Age: 66
End: 2021-03-10

## 2021-03-10 NOTE — TELEPHONE ENCOUNTER
Dr Breanna Zurita said he can give her a call later in the day  We have a full schedule tomorrow during clinic hours

## 2021-03-10 NOTE — TELEPHONE ENCOUNTER
Patient Sees Dr Debby Pacheco a  for [facility] called and would like to know if she can meet with the Dr after the patients appointment tomorrow at 8:45am ?    [address]  - [phone]

## 2021-03-11 ENCOUNTER — OFFICE VISIT (OUTPATIENT)
Dept: OBGYN CLINIC | Facility: MEDICAL CENTER | Age: 66
End: 2021-03-11

## 2021-03-11 ENCOUNTER — APPOINTMENT (OUTPATIENT)
Dept: RADIOLOGY | Facility: MEDICAL CENTER | Age: 66
End: 2021-03-11
Payer: OTHER MISCELLANEOUS

## 2021-03-11 VITALS
BODY MASS INDEX: 34.19 KG/M2 | DIASTOLIC BLOOD PRESSURE: 74 MMHG | HEART RATE: 50 BPM | TEMPERATURE: 98.1 F | SYSTOLIC BLOOD PRESSURE: 166 MMHG | HEIGHT: 75 IN | WEIGHT: 275 LBS

## 2021-03-11 DIAGNOSIS — S62.101A CLOSED FRACTURE OF RIGHT WRIST, INITIAL ENCOUNTER: ICD-10-CM

## 2021-03-11 DIAGNOSIS — S62.101A CLOSED FRACTURE OF RIGHT WRIST, INITIAL ENCOUNTER: Primary | ICD-10-CM

## 2021-03-11 DIAGNOSIS — Z87.81 S/P ORIF (OPEN REDUCTION INTERNAL FIXATION) FRACTURE: ICD-10-CM

## 2021-03-11 DIAGNOSIS — Z98.890 S/P ORIF (OPEN REDUCTION INTERNAL FIXATION) FRACTURE: ICD-10-CM

## 2021-03-11 PROCEDURE — 99024 POSTOP FOLLOW-UP VISIT: CPT | Performed by: ORTHOPAEDIC SURGERY

## 2021-03-11 PROCEDURE — 73110 X-RAY EXAM OF WRIST: CPT

## 2021-03-11 NOTE — LETTER
March 11, 2021     Patient: Joel Mariscal   YOB: 1955   Date of Visit: 3/11/2021       To Whom it May Concern:    Joel Mariscal is under my professional care  He was seen in my office on 3/11/2021  He should remain out of work until his follow up in 4 weeks  Work status will be reevaluated at that time  If you have any questions or concerns, please don't hesitate to call           Sincerely,          Hilario Roberson MD        CC: No Recipients

## 2021-03-11 NOTE — PATIENT INSTRUCTIONS
For the next two weeks at least, use the brace on your injured wrist  Use it longer than two weeks if you continue to have pain  During this time, you should remove the brace 4-5 times a day and practice exercises for your wrist  There are 5 exercises (see below)  You can take the brace off to shower and wash hands as well  Do not lift more than 10 lbs in the next two weeks  Exercises:  - Keep elbow at the side  Rotate palm up and palm down  Assist the rotation with your uninjured hand  - Keep elbow on the table  Flex wrist down and back   Assist the flexion and back bending with your uninjured hand  - Clench fist  Assist the clench with your uninjured hand

## 2021-03-11 NOTE — PROGRESS NOTES
History of the Present Illness     Sheryle Chambers is a 63 y o  male status post right distal radius ORIF 03/01/21  Patient states overall he is doing well  Denies numbness/tingling  Has been trying to move his fingers  Physical Exam     /74   Pulse (!) 50   Temp 98 1 °F (36 7 °C)   Ht 6' 3" (1 905 m)   Wt 125 kg (275 lb)   BMI 34 37 kg/m²     Right Upper Extremity:  Expected postoperative edema  Incision healing well  No erythema, fluctuance, drainage  20 degrees supination  70 degrees pronation  30 degrees flexion  10 degrees extension  DPC 0    5/5 Motor to the APB, FDI, FDP2, FDP5, EDC  Sensation intact to light touch in the median, radial, and ulnar nerve distribution  Palmar cutaneous nerve intact  Radial pulse 2+  Data Review       Imaging:  Xrays of the right wrist taken today were personally reviewed by me and show well-aligned distal radius fracture status post ORIF  No evidence of implant loosening  Assessment and Plan     63 y o  male status post right distal radius ORIF 03/01/21  Xrays were reviewed with the patient today  At this time, patient will be transitioned to a removable wrist splint  Should wear this other than hygiene and therapy  Therapy to work on range of motion, no strengthening  No lifting with this hand  Scar massage discussed  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

## 2021-03-12 ENCOUNTER — TELEPHONE (OUTPATIENT)
Dept: OBGYN CLINIC | Facility: MEDICAL CENTER | Age: 66
End: 2021-03-12

## 2021-03-12 DIAGNOSIS — Z87.81 S/P ORIF (OPEN REDUCTION INTERNAL FIXATION) FRACTURE: Primary | ICD-10-CM

## 2021-03-12 DIAGNOSIS — Z98.890 S/P ORIF (OPEN REDUCTION INTERNAL FIXATION) FRACTURE: Primary | ICD-10-CM

## 2021-03-30 ENCOUNTER — TELEPHONE (OUTPATIENT)
Dept: OBGYN CLINIC | Facility: HOSPITAL | Age: 66
End: 2021-03-30

## 2021-03-30 NOTE — TELEPHONE ENCOUNTER
Patient sees Dr Geovanna Guerrero () called to have operative report from 3/1 to [phone] Attn: Daniel Hopping  Please fax

## 2021-04-07 NOTE — TELEPHONE ENCOUNTER
Brit from [facility] called in requesting operative notes to be faxed again  It was never received       Fax#: [phone] att: Maria C Harris    Please fax

## 2021-04-08 ENCOUNTER — TELEPHONE (OUTPATIENT)
Dept: OBGYN CLINIC | Facility: HOSPITAL | Age: 66
End: 2021-04-08

## 2021-04-08 ENCOUNTER — APPOINTMENT (OUTPATIENT)
Dept: RADIOLOGY | Facility: MEDICAL CENTER | Age: 66
End: 2021-04-08

## 2021-04-08 ENCOUNTER — OFFICE VISIT (OUTPATIENT)
Dept: OBGYN CLINIC | Facility: MEDICAL CENTER | Age: 66
End: 2021-04-08

## 2021-04-08 ENCOUNTER — OFFICE VISIT (OUTPATIENT)
Dept: CARDIOLOGY CLINIC | Facility: CLINIC | Age: 66
End: 2021-04-08
Payer: COMMERCIAL

## 2021-04-08 VITALS
BODY MASS INDEX: 34.19 KG/M2 | HEIGHT: 75 IN | DIASTOLIC BLOOD PRESSURE: 72 MMHG | HEART RATE: 64 BPM | WEIGHT: 275 LBS | SYSTOLIC BLOOD PRESSURE: 134 MMHG

## 2021-04-08 VITALS
DIASTOLIC BLOOD PRESSURE: 78 MMHG | WEIGHT: 289.8 LBS | HEIGHT: 75 IN | BODY MASS INDEX: 36.03 KG/M2 | OXYGEN SATURATION: 97 % | SYSTOLIC BLOOD PRESSURE: 158 MMHG | HEART RATE: 58 BPM

## 2021-04-08 DIAGNOSIS — Z98.890 S/P ORIF (OPEN REDUCTION INTERNAL FIXATION) FRACTURE: ICD-10-CM

## 2021-04-08 DIAGNOSIS — Z87.81 S/P ORIF (OPEN REDUCTION INTERNAL FIXATION) FRACTURE: Primary | ICD-10-CM

## 2021-04-08 DIAGNOSIS — I10 ESSENTIAL HYPERTENSION: ICD-10-CM

## 2021-04-08 DIAGNOSIS — G47.33 OBSTRUCTIVE SLEEP APNEA SYNDROME: ICD-10-CM

## 2021-04-08 DIAGNOSIS — Z98.890 S/P ORIF (OPEN REDUCTION INTERNAL FIXATION) FRACTURE: Primary | ICD-10-CM

## 2021-04-08 DIAGNOSIS — Z87.81 S/P ORIF (OPEN REDUCTION INTERNAL FIXATION) FRACTURE: ICD-10-CM

## 2021-04-08 DIAGNOSIS — S62.101D CLOSED FRACTURE OF RIGHT WRIST WITH ROUTINE HEALING, SUBSEQUENT ENCOUNTER: ICD-10-CM

## 2021-04-08 DIAGNOSIS — E78.2 MIXED HYPERLIPIDEMIA: ICD-10-CM

## 2021-04-08 DIAGNOSIS — R60.0 LOCALIZED EDEMA: ICD-10-CM

## 2021-04-08 DIAGNOSIS — I25.10 CORONARY ARTERY DISEASE INVOLVING NATIVE CORONARY ARTERY OF NATIVE HEART WITHOUT ANGINA PECTORIS: Primary | ICD-10-CM

## 2021-04-08 DIAGNOSIS — Z95.820 S/P ANGIOPLASTY WITH STENT: ICD-10-CM

## 2021-04-08 DIAGNOSIS — E87.8 ELECTROLYTE ABNORMALITY: ICD-10-CM

## 2021-04-08 PROBLEM — R73.9 HYPERGLYCEMIA: Status: RESOLVED | Noted: 2020-07-10 | Resolved: 2021-04-08

## 2021-04-08 PROBLEM — E87.6 HYPOKALEMIA: Status: RESOLVED | Noted: 2020-07-10 | Resolved: 2021-04-08

## 2021-04-08 PROBLEM — E83.42 HYPOMAGNESEMIA: Status: RESOLVED | Noted: 2020-07-10 | Resolved: 2021-04-08

## 2021-04-08 PROBLEM — R63.4 WEIGHT LOSS, NON-INTENTIONAL: Status: RESOLVED | Noted: 2020-07-10 | Resolved: 2021-04-08

## 2021-04-08 PROCEDURE — 99024 POSTOP FOLLOW-UP VISIT: CPT | Performed by: ORTHOPAEDIC SURGERY

## 2021-04-08 PROCEDURE — 73110 X-RAY EXAM OF WRIST: CPT

## 2021-04-08 PROCEDURE — 99214 OFFICE O/P EST MOD 30 MIN: CPT | Performed by: INTERNAL MEDICINE

## 2021-04-08 RX ORDER — DOXAZOSIN MESYLATE 1 MG/1
1 TABLET ORAL DAILY
Qty: 30 TABLET | Refills: 11 | Status: SHIPPED | OUTPATIENT
Start: 2021-04-08 | End: 2022-04-13

## 2021-04-08 NOTE — ASSESSMENT & PLAN NOTE
Patient is currently on rosuvastatin 40 mg daily as well as Zetia and fenofibrate  Lipid Panel 2/9/2021: C 163  T 215  H 38  L 82    Will discuss possible transition to PCSK9 inhibitor at follow up as despite multiple medications his LDL remains above goal

## 2021-04-08 NOTE — ASSESSMENT & PLAN NOTE
Blood pressure remains elevated on exam   Benazepril was stopped in the hospital due to angioedema  I had added amlodipine 2 5 mg and increased it to 5 mg since his last office visit  Blood pressure still elevated intermittently  Will add doxazosin 1 mg daily  Patient was instructed to monitor blood pressure and to call me next week and let me know how his pressures are running

## 2021-04-08 NOTE — TELEPHONE ENCOUNTER
Patient sees Dr Alonzo Leon (Hand Therapist) called stating she faxed over a letter for Dr Marti Bernal pertaining to the patient  Nothing was seen in Epic  She will fax again  Please keep a look out for it

## 2021-04-08 NOTE — LETTER
April 8, 2021     Patient: Flex Singh   YOB: 1955   Date of Visit: 4/8/2021       To Whom it May Concern:    Flex Singh is under my professional care  He was seen in my office on 4/8/2021  He can return to work with the following restrictions: 5lb weight limit, avoid heavy gripping/pinching activities of the right hand/wrist     If you have any questions or concerns, please don't hesitate to call           Sincerely,          Shu Lilly MD        CC: No Recipients

## 2021-04-08 NOTE — TELEPHONE ENCOUNTER
Fax received, we will get it scanned into patient's chart  I did speak with Regina Javier who assured me she tried sending it multiple times and I explained that we understand sometimes it doesn't come through

## 2021-04-08 NOTE — TELEPHONE ENCOUNTER
Lane Flor is calling back asking to speak to Doctor or PA  Lane Flor wants to make sure that there is communication because she was yelled at by the rehab nurse stating that she was going to report Lane Flor wants us to confirm that we got the fax & she is sending it to [phone] very shortly       [phone]

## 2021-04-08 NOTE — ASSESSMENT & PLAN NOTE
Patient continues to have lower extremity edema  Echocardiogram 9/18/2020 showed mild diastolic dysfunction  Suspect edema increased due to amlodipine  NT-proBNP 8/13/2020: 359

## 2021-04-08 NOTE — PROGRESS NOTES
Cardiology Office Visit    Brent Peñaloza  [id number]  1955    [facility] [place] [place] [id number] [phone]      Dear Oliver Mcintosh DO,    I had the pleasure of seeing your patient at our [place] [address] [facility] office today 4/8/2021  As you know he is a pleasant 63y o  year old male with a medical history as described below  Reason for office visit: Follow up coronary artery disease, hypertension and hyperlipidemia  1  Coronary artery disease involving native coronary artery of native heart without angina pectoris  Assessment & Plan:  Coronary Artery Disease:  A  Inferior MI 6/4/2004 with VF arrest   B  PCI and stent to RCA 6/4/2004  C  Cardiac catheterization 10/2005: LM ok  LAD 20-30% proximal stenosis  Dx 50% ostial  LCx 50% OM and PLB with 50%  RCA 40% ISR  No intervention  EF 60%  D  Non ischemic Lexiscan stress test 8/12/2019  EF 54%  Patient denies any chest pain or shortness of breath  Patient is up-to-date with cardiac stress testing as he did undergo Lexiscan stress test 08/12/2019 which showed no evidence of scar or ischemia and an ejection fraction of 54%  Echocardiogram 9/18/2020 showed ejection fraction 55-60% with mild diastolic dysfunction  Will discuss repeat stress testing in 2022 unless symptoms warrant earlier testing  2  S/P angioplasty with stent    3  Essential hypertension  Assessment & Plan:  Blood pressure remains elevated on exam   Benazepril was stopped in the hospital due to angioedema  I had added amlodipine 2 5 mg and increased it to 5 mg since his last office visit  Blood pressure still elevated intermittently  Will add doxazosin 1 mg daily  Patient was instructed to monitor blood pressure and to call me next week and let me know how his pressures are running  Orders:  -     doxazosin (CARDURA) 1 mg tablet; Take 1 tablet (1 mg total) by mouth daily    4  Mixed hyperlipidemia  Assessment & Plan:  Patient is currently on rosuvastatin 40 mg daily as well as Zetia and fenofibrate  Lipid Panel 2/9/2021: C 163  T 215  H 38  L 82  Will discuss possible transition to PCSK9 inhibitor at follow up as despite multiple medications his LDL remains above goal        5  Obstructive sleep apnea syndrome  Assessment & Plan:  Uses CPAP consistently at home  6  Localized edema  Assessment & Plan:  Patient continues to have lower extremity edema  Echocardiogram 9/18/2020 showed mild diastolic dysfunction  Suspect edema increased due to amlodipine  NT-proBNP 8/13/2020: 359       7  Electrolyte abnormality           HPI     Patient has a history of MI with VF arrest 2004 at which time he underwent PCI of the right coronary artery  Patient was admitted to [facility] 7/9/2020 with swelling of his face and tongue  This occurred after eating dinner on 07/09/2020  The patient was given Benadryl and steroids as well as epinephrine it was felt that the patient likely had developed angioedema from ACE-inhibitor  On admission he was noted to have hypokalemia with a potassium of 2 9 as well as hypomagnesemia with a magnesium of 0 4  Electrolytes were repleted  The patient felt well and was discharged home on 07/10/2020       8/12/2020: Patient presents to reestablish care for coronary artery disease, hypertension and hyperlipidemia  Patient states that since his hospitalization he has been feeling significantly better  He reported prior to admission that he had significantly decreased energy and was not eating well  He did undergo a nuclear stress test 08/11/2019 which showed ejection fraction of 54% and was in normal limits  Patient has noted occasional lower extremity edema  He denies any chest pain  He denies any shortness of breath  He denies any lightheadedness or dizziness  He denies any palpitations        4/8/2021: Patient presents to the office today for follow-up  He has been doing well in general the exception of breaking his wrist 02/22/2021  He underwent surgical intervention 03/01/2021  He still has a brace in place  Blood pressure has been intermittently labile  He denies any chest pain  Patient did present for blood pressure check 02/10/2021 as his blood pressure had been running high  His amlodipine was increased to 5 mg daily  Patient Active Problem List   Diagnosis    Chronic kidney disease (CKD), stage III (moderate)    HTN (hypertension)    Hyperlipidemia    Esophagitis    Coronary artery disease involving native coronary artery of native heart without angina pectoris    H/O acute myocardial infarction    Sleep apnea    S/P angioplasty with stent    Localized edema     Past Medical History:   Diagnosis Date    Chronic kidney disease     Coronary artery disease     CPAP (continuous positive airway pressure) dependence     Intermittant    GERD (gastroesophageal reflux disease)     H/O heart artery stent     Heart attack ([facility] )     Hyperlipidemia     Hypertension     MI (myocardial infarction) ([facility] )     Myocardial infarction ([place] [address] )     2004 x 1 stent    LUCIO (obstructive sleep apnea)     Tobacco use      Social History     Socioeconomic History    Marital status: /Civil Union     Spouse name: Not on file    Number of children: Not on file    Years of education: Not on file    Highest education level: Not on file   Occupational History    Not on file   Social Needs    Financial resource strain: Not on file    Food insecurity     Worry: Not on file     Inability: Not on file   [employer] needs     Medical: Not on file     Non-medical: Not on file   Tobacco Use    Smoking status: Current Every Day Smoker     Packs/day: 0 25     Types: Cigarettes    Smokeless tobacco: Never Used    Tobacco comment:  Occ Cigs   Substance and Sexual Activity    Alcohol use: Not Currently     Frequency: Monthly or less    Drug use: Never    Sexual activity: Not on file     Comment: Defer   Lifestyle    Physical activity     Days per week: Not on file     Minutes per session: Not on file    Stress: Not on file   Relationships    Social connections     Talks on phone: Not on file     Gets together: Not on file     Attends Congregational service: Not on file     Active member of club or organization: Not on file     Attends meetings of clubs or organizations: Not on file     Relationship status: Not on file    Intimate partner violence     Fear of current or ex partner: Not on file     Emotionally abused: Not on file     Physically abused: Not on file     Forced sexual activity: Not on file   Other Topics Concern    Not on file   Social History Narrative    Not on file      Family History   Problem Relation Age of Onset    Hypertension Mother    [facility] Arthritis Mother     Hypertension Father     Hyperlipidemia Father     Hypertension Sister     Hyperlipidemia Sister     Hyperlipidemia Brother     Diabetes Paternal Grandmother      Past Surgical History:   Procedure Laterality Date    BACK SURGERY  2012    CARDIAC CATHETERIZATION  06/04/2004    PTCA and Cypher stent placement to the RCA(p)   CARDIAC CATHETERIZATION  09/16/2005    LAD(p) 20-30%  Dx 50% ostial  LCx 50%  OM and PLB 50%  RCA 40% ISR  No intervention  EF 60%   CARDIAC SURGERY      HERNIA REPAIR      IL OPEN RX DISTAL RADIUS FX, INTRA-ARTICULAR, 2 FRAG Right 3/1/2021    Procedure: OPEN REDUCTION W/ INTERNAL FIXATION (ORIF) DISTAL RADIUS;  Surgeon: Albert Vaca MD;  Location: [address] [place] [address];  Service: Orthopedics    ROTATOR CUFF REPAIR         Current Outpatient Medications:     allopurinol (ZYLOPRIM) 300 mg tablet, TAKE (1) TABLET BY MOUTH ONCE DAILY  , Disp: , Rfl:     amLODIPine (NORVASC) 5 mg tablet, Take 1 tablet (5 mg total) by mouth daily, Disp: 30 tablet, Rfl: 11    aspirin (ECOTRIN LOW STRENGTH) 81 mg EC tablet, Take 81 mg by mouth daily after dinner Takes at 1800, Disp: , Rfl:     co-enzyme Q-10 100 mg capsule, Take 100 mg by mouth daily , Disp: , Rfl:     ezetimibe (ZETIA) 10 mg tablet, Take 10 mg by mouth daily at bedtime , Disp: , Rfl:     fluticasone (FLONASE) 50 mcg/act nasal spray, 2 sprays into each nostril every 12 (twelve) hours as needed (sinus infections) , Disp: , Rfl:     lansoprazole (PREVACID) 30 mg capsule, TAKE 1 CAPSULE BY MOUTH ONCE DAILY  , Disp: , Rfl:     metoprolol tartrate (LOPRESSOR) 100 mg tablet, TAKE 1 TABLET BY MOUTH TWICE DAILY  , Disp: , Rfl:     rosuvastatin (CRESTOR) 40 MG tablet, TAKE 1 TABLET BY MOUTH ONCE DAILY IN THE EVENING , Disp: , Rfl:     torsemide (DEMADEX) 10 mg tablet, TAKE (1) TABLET BY MOUTH ONCE DAILY  , Disp: , Rfl:     doxazosin (CARDURA) 1 mg tablet, Take 1 tablet (1 mg total) by mouth daily, Disp: 30 tablet, Rfl: 11    HYDROcodone-acetaminophen (NORCO) 5-325 mg per tablet, Take 1 tablet by mouth every 6 (six) hours as needed for pain for up to 20 dosesMax Daily Amount: 4 tablets (Patient not taking: Reported on 4/8/2021), Disp: 20 tablet, Rfl: 0    oxyCODONE (ROXICODONE) 5 mg immediate release tablet, Take 1 tablet (5 mg total) by mouth every 6 (six) hours as needed for severe pain for up to 20 dosesMax Daily Amount: 20 mg (Patient not taking: Reported on 4/8/2021), Disp: 20 tablet, Rfl: 0  Allergies   Allergen Reactions    Ace Inhibitors Angioedema    Alprazolam Other (See Comments)     Other reaction(s): Other: Document details in comments  SEVERE DISOREINTATION/CRAZY  SEVERE DISOREINTATION/CRAZY      Buspirone      Other reaction(s): Other (See Comments), Other: Document details in comments  SEVERE DISORIENTATION/CRAZY  SEVERE DISORIENTATION/CRAZY      Lorazepam      Other reaction(s): Other (See Comments), Other: Document details in comments  SEVERE DISORIENTATION/CRAZY  SEVERE DISORIENTATION/CRAZY         Cardiac Test Results:     Lipid Panel 2/9/2021: C 163  T 215  H 38   L 82     Echocardiogram 9/18/2020:   EF 55-60%  Upper normal wall thickness  Mild diastolic dysfunction  Mildly dilated left atrium  Trace mitral regurgitation  Trace to mild tricuspid regurgitation  No significant change compared to prior study 05/11/2015  Nuclear Lexiscan Stress Test 8/12/2019:   Normal study  EF 54%  Echocardiogram(5/11/2015):  EF 55-60%  Mild MR  Mild TR  Holter Monitor - (7/6/2004) 27 VE beats  1 idioventricular episode 2 4 seconds  EKG - (4/15/2016) NSR 70 BPM  Inferior MI  Nuclear Stress Test - (5/11/2015) Noah Yan  No scar  No ischemia  EF 57%  Cardiac Procedures:    Cardiac Catheterization - (9/16/2005) LM ok  LAD 20-30% proximal stenosis  Dx 50% ostial  LCx 50% OM and PLB with 50%  RCA 40% ISR  No intervention  EF 60%  Cardiac Catheterization - (6/4/2004) PTCA and Cypher stent placement to the Proximal RCA  Percutaneous Coronary Intervention - (6/4/2004) Cypher stent to RCA  Review of Systems:    Review of Systems   Constitutional: Negative for activity change, appetite change and fatigue  HENT: Negative for congestion, hearing loss, tinnitus and trouble swallowing  Eyes: Negative for visual disturbance  Respiratory: Negative for cough, chest tightness, shortness of breath and wheezing  Cardiovascular: Positive for leg swelling  Negative for chest pain and palpitations  Gastrointestinal: Negative for abdominal distention, abdominal pain, nausea and vomiting  Genitourinary: Negative for difficulty urinating  Musculoskeletal: Negative for arthralgias  Skin: Negative for rash  Neurological: Negative for dizziness, syncope and light-headedness  Hematological: Does not bruise/bleed easily  Psychiatric/Behavioral: Negative for confusion  The patient is not nervous/anxious  All other systems reviewed and are negative          Vitals:    04/08/21 1537 04/08/21 1636   BP: 152/88 158/78   Pulse: 58    SpO2: 97%    Weight: 131 kg (289 lb 12 8 oz) Height: 6' 3" (1 905 m)      Vitals:    04/08/21 1537   Weight: 131 kg (289 lb 12 8 oz)     Height: 6' 3" (190 5 cm)     Physical Exam   Constitutional: He is oriented to person, place, and time  He appears well-developed and well-nourished  HENT:   Head: Normocephalic and atraumatic  Eyes: Pupils are equal, round, and reactive to light  Conjunctivae are normal    Neck: Normal range of motion  No JVD present  Cardiovascular: Normal rate, regular rhythm and normal heart sounds  Exam reveals no gallop and no friction rub  No murmur heard  Pulmonary/Chest: Effort normal and breath sounds normal    Abdominal: Soft  Bowel sounds are normal    Musculoskeletal:         General: Edema present  Neurological: He is alert and oriented to person, place, and time  Skin: Skin is warm and dry  Psychiatric: He has a normal mood and affect  His behavior is normal    Vitals reviewed

## 2021-04-08 NOTE — ASSESSMENT & PLAN NOTE
Coronary Artery Disease:  A  Inferior MI 6/4/2004 with VF arrest   B  PCI and stent to RCA 6/4/2004  C  Cardiac catheterization 10/2005: LM ok  LAD 20-30% proximal stenosis  Dx 50% ostial  LCx 50% OM and PLB with 50%  RCA 40% ISR  No intervention  EF 60%  D  Non ischemic Lexiscan stress test 8/12/2019  EF 54%  Patient denies any chest pain or shortness of breath  Patient is up-to-date with cardiac stress testing as he did undergo Lexiscan stress test 08/12/2019 which showed no evidence of scar or ischemia and an ejection fraction of 54%  Echocardiogram 9/18/2020 showed ejection fraction 55-60% with mild diastolic dysfunction  Will discuss repeat stress testing in 2022 unless symptoms warrant earlier testing

## 2021-04-09 ENCOUNTER — TELEPHONE (OUTPATIENT)
Dept: OBGYN CLINIC | Facility: HOSPITAL | Age: 66
End: 2021-04-09

## 2021-04-09 NOTE — TELEPHONE ENCOUNTER
Patient sees Dr Tomas Thomas the   is calling for office notes from 4/8  I advised I will fax once they are signed off        FAX: [phone]

## 2021-05-06 ENCOUNTER — APPOINTMENT (OUTPATIENT)
Dept: RADIOLOGY | Facility: MEDICAL CENTER | Age: 66
End: 2021-05-06

## 2021-05-06 ENCOUNTER — TELEPHONE (OUTPATIENT)
Dept: OBGYN CLINIC | Facility: MEDICAL CENTER | Age: 66
End: 2021-05-06

## 2021-05-06 ENCOUNTER — OFFICE VISIT (OUTPATIENT)
Dept: OBGYN CLINIC | Facility: MEDICAL CENTER | Age: 66
End: 2021-05-06

## 2021-05-06 VITALS
SYSTOLIC BLOOD PRESSURE: 155 MMHG | BODY MASS INDEX: 35.93 KG/M2 | WEIGHT: 289 LBS | DIASTOLIC BLOOD PRESSURE: 81 MMHG | HEART RATE: 52 BPM | HEIGHT: 75 IN

## 2021-05-06 DIAGNOSIS — Z87.81 S/P ORIF (OPEN REDUCTION INTERNAL FIXATION) FRACTURE: ICD-10-CM

## 2021-05-06 DIAGNOSIS — M18.11 ARTHRITIS OF CARPOMETACARPAL (CMC) JOINT OF RIGHT THUMB: ICD-10-CM

## 2021-05-06 DIAGNOSIS — Z98.890 S/P ORIF (OPEN REDUCTION INTERNAL FIXATION) FRACTURE: ICD-10-CM

## 2021-05-06 DIAGNOSIS — Z87.81 S/P ORIF (OPEN REDUCTION INTERNAL FIXATION) FRACTURE: Primary | ICD-10-CM

## 2021-05-06 DIAGNOSIS — Z98.890 S/P ORIF (OPEN REDUCTION INTERNAL FIXATION) FRACTURE: Primary | ICD-10-CM

## 2021-05-06 PROCEDURE — 99024 POSTOP FOLLOW-UP VISIT: CPT | Performed by: ORTHOPAEDIC SURGERY

## 2021-05-06 PROCEDURE — 73110 X-RAY EXAM OF WRIST: CPT

## 2021-05-06 NOTE — TELEPHONE ENCOUNTER
Patient sees Dr Natalia Sutherland Part at [facility] requesting work notes from 5/6/2021, faxed to [phone] completed

## 2021-05-06 NOTE — PROGRESS NOTES
History of the Present Illness     Doreen Brito is a 63 y o  male who presents to the office today for a follow up evaluation s/p right distal radius fracture ORIF 3/1/21  This is a continuation of a workers compensation case  Patient states he has made improvement since his last visit  He has been experiencing pain at the base of his thumb which his therapist has been treating with a comfort cool brace  He continues to work on his range of motion with his hand therapist   He does continue to have ulnar-sided pain and swelling about the wrist   He notes this was  Does limit him from some activities especially when he was trying to start his   Patient has not had any new injuries  He does not report any numbness and tingling today  Has returned to work with no lifting greater than 5 lb  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 but does report that 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 and this does make him nervous as he does not have full strength about his wrist       Physical Exam     /81   Pulse (!) 52   Ht 6' 3" (1 905 m)   Wt 131 kg (289 lb)   BMI 36 12 kg/m²       Right wrist  Skin intact, incision well healed  No erythema or ecchymosis noted  Resolving swelling, mild swelling noted at ulnar aspect   50 degrees of forearm supination, full pronation   30 degrees of wrist flexion, 45 degrees of wrist extension  Tender at EPL, ulnar styloid, thumb cmc    Non tender at distal radius  5/5 Motor to the APB, FDI, FDP2, FDP5, EDC  Sensation intact to light touch in the median, radial, and ulnar nerve distribution    Brisk capillary refill noted to all digits    Data Review       Imaging:   x-rays of the right wrist obtained on 05/06/2021 were personally reviewed by me in the office and pain demonstrate stable alignment of implant at the distal radius with healing noted at the ulnar styloid fracture,  Degenerative changes noted at the thumb Aia 16    Assessment and Plan        72-year-old male status post above surgery with arthritic flare of thumb CMC arthritis  Patient and I discussed in regards to his distal radius and ulnar styloid fracture that he continues to make progress with hand therapy  I would like him to start aggressive strengthening with therapy  He may perform all activities with no restrictions  We again discussed that ulnar-sided wrist pain is completely normal with the injury he has sustained and can take up to 6 months to improve  This is likely what is limiting his ability to fully supinate as well  In regards to his right thumb pain this is an arthritic flare of CMC arthritis  I recommended use of Voltaren gel as well as continued use of his Comfort Cool  We did discuss in the future corticosteroid injection may be warranted  Expect him to reaches maximal medical improvement in 5-6 months  This may be longer than expected due to the arthritic flare  He may return to work with no lifting greater than 10 lb with the right hand and no heavy pinching or gripping  He  I will see him back in 6 weeks time for re-evaluation and new x-rays      Follow Up: 6 weeks     To Do Next Visit: re-evaluation, new x-rays,  possible thumb CMC CSI    PROCEDURES PERFORMED:  Procedures  No Procedures performed today      Scribe Attestation    I,:  Rhiannon Elliott MA am acting as a scribe while in the presence of the attending physician :       I,:  Jo-Ann Truong MD personally performed the services described in this documentation    as scribed in my presence :

## 2021-05-07 NOTE — TELEPHONE ENCOUNTER
Anyi Fernández is calling back stating she received the incorrect office notes      Faxed 5/6 notes to [phone]

## 2021-06-17 ENCOUNTER — OFFICE VISIT (OUTPATIENT)
Dept: OBGYN CLINIC | Facility: MEDICAL CENTER | Age: 66
End: 2021-06-17
Payer: OTHER MISCELLANEOUS

## 2021-06-17 ENCOUNTER — TELEPHONE (OUTPATIENT)
Dept: OBGYN CLINIC | Facility: OTHER | Age: 66
End: 2021-06-17

## 2021-06-17 ENCOUNTER — APPOINTMENT (OUTPATIENT)
Dept: RADIOLOGY | Facility: MEDICAL CENTER | Age: 66
End: 2021-06-17

## 2021-06-17 VITALS
SYSTOLIC BLOOD PRESSURE: 174 MMHG | WEIGHT: 289 LBS | HEART RATE: 52 BPM | HEIGHT: 75 IN | BODY MASS INDEX: 35.93 KG/M2 | DIASTOLIC BLOOD PRESSURE: 79 MMHG

## 2021-06-17 DIAGNOSIS — M18.11 ARTHRITIS OF CARPOMETACARPAL (CMC) JOINT OF RIGHT THUMB: ICD-10-CM

## 2021-06-17 DIAGNOSIS — Z87.81 S/P ORIF (OPEN REDUCTION INTERNAL FIXATION) FRACTURE: ICD-10-CM

## 2021-06-17 DIAGNOSIS — Z98.890 S/P ORIF (OPEN REDUCTION INTERNAL FIXATION) FRACTURE: ICD-10-CM

## 2021-06-17 DIAGNOSIS — Z98.890 S/P ORIF (OPEN REDUCTION INTERNAL FIXATION) FRACTURE: Primary | ICD-10-CM

## 2021-06-17 DIAGNOSIS — Z87.81 S/P ORIF (OPEN REDUCTION INTERNAL FIXATION) FRACTURE: Primary | ICD-10-CM

## 2021-06-17 PROCEDURE — 99214 OFFICE O/P EST MOD 30 MIN: CPT | Performed by: ORTHOPAEDIC SURGERY

## 2021-06-17 PROCEDURE — 20600 DRAIN/INJ JOINT/BURSA W/O US: CPT | Performed by: ORTHOPAEDIC SURGERY

## 2021-06-17 PROCEDURE — 73110 X-RAY EXAM OF WRIST: CPT

## 2021-06-17 RX ADMIN — LIDOCAINE HYDROCHLORIDE 0.5 ML: 10 INJECTION, SOLUTION INFILTRATION; PERINEURAL at 11:55

## 2021-06-17 RX ADMIN — BETAMETHASONE SODIUM PHOSPHATE AND BETAMETHASONE ACETATE 3 MG: 3; 3 INJECTION, SUSPENSION INTRA-ARTICULAR; INTRALESIONAL; INTRAMUSCULAR; SOFT TISSUE at 11:55

## 2021-06-17 NOTE — TELEPHONE ENCOUNTER
Jesusita Pompa @ [address] [place] [address] is requesting Office Notes from todays visit to be faxed      Fax # [phone] Deejay Lorenz    C/b # [phone]

## 2021-06-17 NOTE — LETTER
June 17, 2021     Patient: Jose Luis Cardona   YOB: 1955   Date of Visit: 6/17/2021       To Whom it May Concern:    Jose Luis Cardona is under my professional care  He was seen in my office on 6/17/2021  He may return to work with no lifting, pinching, gripping greater than 20 lbs with the right upper extremity  He will be re-evaluated in 4 weeks  If you have any questions or concerns, please don't hesitate to call           Sincerely,          Sondra Pineda MD        CC: No Recipients

## 2021-06-17 NOTE — PROGRESS NOTES
Chief Complaint      status post ORIF right distal radius    History of Present Illness     Brent Peñaloza is a 61 y o  right hand dominant male who presents to the office today for follow-up evaluation status post right distal radius ORIF on 03/01/2021  This is a continuation of a worker's compensation case  Patient states he has overall doing well today  He continues to work with hand therapy  He states he has had some improvement with his range of motion  He has returned to work with no lifting greater than 10 lbs  He does continue to note pain with use of tools as well as pain at the base of the thumb with gripping  He has been using a [employer]fort Cool brace with minimal relief  He notes no new injuries  He reports no numbness or tingling  Patient states he has had episodes of shaking the right wrist and hand, he attributes this to weakness overall the right wrist        Past Medical History:   Diagnosis Date    Chronic kidney disease     Coronary artery disease     CPAP (continuous positive airway pressure) dependence     Intermittant    GERD (gastroesophageal reflux disease)     H/O heart artery stent     [facility] [address]     Hyperlipidemia     Hypertension     MI [facility]myocardial infarction) ([facility] [address]     Myocardial infarction ([facility] [address]     2004 x 1 stent    LUCIO (obstructive sleep apnea)     Tobacco use        Past Surgical History:   Procedure Laterality Date    BACK SURGERY  2012    CARDIAC CATHETERIZATION  06/04/2004    PTCA and Cypher stent placement to the RCA(p)   CARDIAC CATHETERIZATION  09/16/2005    LAD(p) 20-30%  Dx 50% ostial  LCx 50%  OM and PLB 50%  RCA 40% ISR  No intervention  EF 60%   CARDIAC SURGERY      HERNIA REPAIR      CO OPEN RX DISTAL RADIUS FX, INTRA-ARTICULAR, 2 FRAG Right 3/1/2021    Procedure: OPEN REDUCTION W/ INTERNAL FIXATION (ORIF) DISTAL RADIUS;  Surgeon:  Tia Marsh MD;  Location:  MAIN OR;  Service: Orthopedics    ROTATOR CUFF REPAIR Allergies   Allergen Reactions    Ace Inhibitors Angioedema    Alprazolam Other (See Comments)     Other reaction(s): Other: Document details in comments  SEVERE DISOREINTATION/CRAZY  SEVERE DISOREINTATION/CRAZY      Buspirone      Other reaction(s): Other (See Comments), Other: Document details in comments  SEVERE DISORIENTATION/CRAZY  SEVERE DISORIENTATION/CRAZY      Lorazepam      Other reaction(s): Other (See Comments), Other: Document details in comments  SEVERE DISORIENTATION/CRAZY  SEVERE DISORIENTATION/CRAZY         Current Outpatient Medications on File Prior to Visit   Medication Sig Dispense Refill    allopurinol (ZYLOPRIM) 300 mg tablet TAKE (1) TABLET BY MOUTH ONCE DAILY   amLODIPine (NORVASC) 5 mg tablet Take 1 tablet (5 mg total) by mouth daily 30 tablet 11    aspirin (ECOTRIN LOW STRENGTH) 81 mg EC tablet Take 81 mg by mouth daily after dinner Takes at 1800      co-enzyme Q-10 100 mg capsule Take 100 mg by mouth daily       Diclofenac Sodium (VOLTAREN) 1 % Apply 2 g topically 4 (four) times a day 100 g 0    doxazosin (CARDURA) 1 mg tablet Take 1 tablet (1 mg total) by mouth daily 30 tablet 11    ezetimibe (ZETIA) 10 mg tablet Take 10 mg by mouth daily at bedtime       fluticasone (FLONASE) 50 mcg/act nasal spray 2 sprays into each nostril every 12 (twelve) hours as needed (sinus infections)       HYDROcodone-acetaminophen (NORCO) 5-325 mg per tablet Take 1 tablet by mouth every 6 (six) hours as needed for pain for up to 20 dosesMax Daily Amount: 4 tablets 20 tablet 0    lansoprazole (PREVACID) 30 mg capsule TAKE 1 CAPSULE BY MOUTH ONCE DAILY   metoprolol tartrate (LOPRESSOR) 100 mg tablet TAKE 1 TABLET BY MOUTH TWICE DAILY        oxyCODONE (ROXICODONE) 5 mg immediate release tablet Take 1 tablet (5 mg total) by mouth every 6 (six) hours as needed for severe pain for up to 20 dosesMax Daily Amount: 20 mg (Patient not taking: Reported on 5/6/2021) 20 tablet 0    rosuvastatin (CRESTOR) 40 MG tablet TAKE 1 TABLET BY MOUTH ONCE DAILY IN THE EVENING   torsemide (DEMADEX) 10 mg tablet TAKE (1) TABLET BY MOUTH ONCE DAILY  No current facility-administered medications on file prior to visit  Social History     Tobacco Use    Smoking status: Current Every Day Smoker     Packs/day: 0 25     Types: Cigarettes    Smokeless tobacco: Never Used    Tobacco comment: Occ Cigs   Vaping Use    Vaping Use: Never used   Substance Use Topics    Alcohol use: Not Currently    Drug use: Never       Family History   Problem Relation Age of Onset    Hypertension Mother    Beckioriana Wilhelm Arthritis Mother     Hypertension Father     Hyperlipidemia Father     Hypertension Sister     Hyperlipidemia Sister     Hyperlipidemia Brother     Diabetes Paternal Grandmother        Review of Systems     As stated in the HPI  All other systems were reviewed and are negative  Physical Exam     BP (!) 174/79   Pulse (!) 52   Ht 6' 3" (1 905 m)   Wt 131 kg (289 lb)   BMI 36 12 kg/m²     GENERAL: This is a well-developed, well-nourished, age-appropriate patient in no acute distress  The patient is alert and oriented x3  Pleasant and cooperative  Eyes: Anicteric sclerae  Extraocular movements appear intact  HENT: Nares are patent with no drainage  Lungs: There is equal chest rise on inspection  Breathing is non-labored with no audible wheezing  Cardiovascular: No cyanosis  No upper extremity lymphadema  Skin: Skin is warm to touch  No obvious skin lesions or rashes other than described below  Neurologic: No ataxia  Psychiatric: Mood and affect are appropriate      Right wrist   Skin intact, incision is well healed   No erythema or ecchymosis noted  No swelling noted   DPC 0   Lacking 10 degrees of full wrist flexion, lacking 5 degrees of full wrist extension   Full forearm pronation, lacking 10 degrees of supination  Non tender at distal radius   Tender at thumb CMC and MCP joint dorsally Non tender at a1 pulley and 1st dorsal compartment   5/5 Motor to the APB, FDI, FDP2, FDP5, EDC  Sensation intact to light touch in the median, radial, and ulnar nerve distribution  Brisk capillary refill noted     Data Review     Labs:  None today     Electrodiagnostic Testing:  None today     Imaging:  X-rays of the right wrist obtained on 06/17/2021 were personally reviewed by me in the office and demonstrate stable alignment of implant with healing at fracture site of the distal radius    Assessment and Plan      Diagnoses and all orders for this visit:    S/P ORIF (open reduction internal fixation) fracture  -     XR wrist 3+ vw right; Future  -     Ambulatory referral to PT/OT hand therapy; Future    Other orders  -     Small joint arthrocentesis: R thumb CMC          53-year-old male status post above surgery with right thumb CMC arthritis  Patient continues to do well postoperatively  He has made significant improvement with his range of motion  I would like him to begin aggressive strengthening with hand therapy  He should also work on condition in for his work related tasks such as using tools  We discussed may return to work with no lifting, pinching, gripping greater than 20 lbs with the RUE  In regards to his right thumb CMC pain we discussed that this may have been present prior to injury but was exacerbated due to the injury  As he continues have symptoms refractory to bracing I would recommend a corticosteroid injection  Risks and benefits of injection were discussed  Risks of the injection including pain, infection, tendon rupture, progression of arthritis, fat atrophy, depigmentation, and worsening of symptoms were all discussed with the patient  There was good understanding by the patient and they wish to proceed  Injection was tolerated well  We discussed that this shaking he is experiencing may be due to muscle weakness or to an unrelated condition     We will see him back in 4 weeks time for re-evaluation  Follow Up: 4 weeks     To Do Next Visit: re-evaluation, x-rays of right wrist     PROCEDURES PERFORMED:  Small joint arthrocentesis: R thumb CMC  [place] Protocol:  Consent: Verbal consent obtained    Consent given by: patient    Supporting Documentation  Indications: pain   Procedure Details  Location: thumb - R thumb CMC  Preparation: Patient was prepped and draped in the usual sterile fashion  Needle size: 25 G  Ultrasound guidance: no  Medications administered: 3 mg betamethasone acetate-betamethasone sodium phosphate 6 (3-3) mg/mL; 0 5 mL lidocaine 1 %    Patient tolerance: patient tolerated the procedure well with no immediate complications  Dressing:  Sterile dressing applied        Scribe Attestation    I,:  Johnathon Garcia MA am acting as a scribe while in the presence of the attending physician :       I,:  Taylor Maria MD personally performed the services described in this documentation    as scribed in my presence :               Scribe Attestation    I,:  Johnathon Garcia MA am acting as a scribe while in the presence of the attending physician :       I,:  Taylor Maria MD personally performed the services described in this documentation    as scribed in my presence :

## 2021-06-29 RX ORDER — LIDOCAINE HYDROCHLORIDE 10 MG/ML
0.5 INJECTION, SOLUTION INFILTRATION; PERINEURAL
Status: COMPLETED | OUTPATIENT
Start: 2021-06-17 | End: 2021-06-17

## 2021-06-29 RX ORDER — BETAMETHASONE SODIUM PHOSPHATE AND BETAMETHASONE ACETATE 3; 3 MG/ML; MG/ML
3 INJECTION, SUSPENSION INTRA-ARTICULAR; INTRALESIONAL; INTRAMUSCULAR; SOFT TISSUE
Status: COMPLETED | OUTPATIENT
Start: 2021-06-17 | End: 2021-06-17

## 2021-07-27 ENCOUNTER — OFFICE VISIT (OUTPATIENT)
Dept: OBGYN CLINIC | Facility: MEDICAL CENTER | Age: 66
End: 2021-07-27
Payer: OTHER MISCELLANEOUS

## 2021-07-27 ENCOUNTER — APPOINTMENT (OUTPATIENT)
Dept: RADIOLOGY | Facility: MEDICAL CENTER | Age: 66
End: 2021-07-27
Payer: COMMERCIAL

## 2021-07-27 VITALS
WEIGHT: 288 LBS | DIASTOLIC BLOOD PRESSURE: 76 MMHG | BODY MASS INDEX: 35.81 KG/M2 | HEART RATE: 50 BPM | SYSTOLIC BLOOD PRESSURE: 173 MMHG | RESPIRATION RATE: 20 BRPM | HEIGHT: 75 IN

## 2021-07-27 DIAGNOSIS — Z98.890 S/P ORIF (OPEN REDUCTION INTERNAL FIXATION) FRACTURE: Primary | ICD-10-CM

## 2021-07-27 DIAGNOSIS — Z98.890 S/P ORIF (OPEN REDUCTION INTERNAL FIXATION) FRACTURE: ICD-10-CM

## 2021-07-27 DIAGNOSIS — Z87.81 S/P ORIF (OPEN REDUCTION INTERNAL FIXATION) FRACTURE: Primary | ICD-10-CM

## 2021-07-27 DIAGNOSIS — Z87.81 S/P ORIF (OPEN REDUCTION INTERNAL FIXATION) FRACTURE: ICD-10-CM

## 2021-07-27 PROCEDURE — 73110 X-RAY EXAM OF WRIST: CPT

## 2021-07-27 PROCEDURE — 99213 OFFICE O/P EST LOW 20 MIN: CPT | Performed by: ORTHOPAEDIC SURGERY

## 2021-07-27 NOTE — PROGRESS NOTES
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        Past Medical History:   Diagnosis Date    Chronic kidney disease     Coronary artery disease     CPAP (continuous positive airway pressure) dependence     Intermittant    GERD (gastroesophageal reflux disease)     H/O heart artery stent     [facility] attack [facility] [address]     Hyperlipidemia     Hypertension     MI [facility]ocardial infarction) ([facility] [address]     Myocardial infarction ([facility] [address]     2004 x 1 stent    LUCIO (obstructive sleep apnea)     Tobacco use        Past Surgical History:   Procedure Laterality Date    BACK SURGERY  2012    CARDIAC CATHETERIZATION  06/04/2004    PTCA and Cypher stent placement to the RCA(p)   CARDIAC CATHETERIZATION  09/16/2005    LAD(p) 20-30%  Dx 50% ostial  LCx 50%  OM and PLB 50%  RCA 40% ISR  No intervention  EF 60%   CARDIAC SURGERY      HERNIA REPAIR      PA OPEN RX DISTAL RADIUS FX, INTRA-ARTICULAR, 2 FRAG Right 3/1/2021    Procedure: OPEN REDUCTION W/ INTERNAL FIXATION (ORIF) DISTAL RADIUS;  Surgeon: Katina Braxton MD;  Location: [address] [place] [address];  Service: Orthopedics    ROTATOR CUFF REPAIR         Allergies   Allergen Reactions    Ace Inhibitors Angioedema    Alprazolam Other (See Comments)     Other reaction(s): Other: Document details in comments  SEVERE DISOREINTATION/CRAZY  SEVERE DISOREINTATION/CRAZY      Buspirone      Other reaction(s):  Other (See Comments), Other: Document details in comments  SEVERE DISORIENTATION/CRAZY  SEVERE DISORIENTATION/CRAZY      Lorazepam      Other reaction(s): Other (See Comments), Other: Document details in comments  SEVERE DISORIENTATION/CRAZY  SEVERE DISORIENTATION/CRAZY         Current Outpatient Medications on File Prior to Visit   Medication Sig Dispense Refill    allopurinol (ZYLOPRIM) 300 mg tablet TAKE (1) TABLET BY MOUTH ONCE DAILY   amLODIPine (NORVASC) 5 mg tablet Take 1 tablet (5 mg total) by mouth daily 30 tablet 11    aspirin (ECOTRIN LOW STRENGTH) 81 mg EC tablet Take 81 mg by mouth daily after dinner Takes at 1800      co-enzyme Q-10 100 mg capsule Take 100 mg by mouth daily       Diclofenac Sodium (VOLTAREN) 1 % Apply 2 g topically 4 (four) times a day 100 g 0    doxazosin (CARDURA) 1 mg tablet Take 1 tablet (1 mg total) by mouth daily 30 tablet 11    ezetimibe (ZETIA) 10 mg tablet Take 10 mg by mouth daily at bedtime       fluticasone (FLONASE) 50 mcg/act nasal spray 2 sprays into each nostril every 12 (twelve) hours as needed (sinus infections)       lansoprazole (PREVACID) 30 mg capsule TAKE 1 CAPSULE BY MOUTH ONCE DAILY   metoprolol tartrate (LOPRESSOR) 100 mg tablet TAKE 1 TABLET BY MOUTH TWICE DAILY   rosuvastatin (CRESTOR) 40 MG tablet TAKE 1 TABLET BY MOUTH ONCE DAILY IN THE EVENING   torsemide (DEMADEX) 10 mg tablet TAKE (1) TABLET BY MOUTH ONCE DAILY   HYDROcodone-acetaminophen (NORCO) 5-325 mg per tablet Take 1 tablet by mouth every 6 (six) hours as needed for pain for up to 20 dosesMax Daily Amount: 4 tablets (Patient not taking: Reported on 7/27/2021) 20 tablet 0    oxyCODONE (ROXICODONE) 5 mg immediate release tablet Take 1 tablet (5 mg total) by mouth every 6 (six) hours as needed for severe pain for up to 20 dosesMax Daily Amount: 20 mg (Patient not taking: Reported on 5/6/2021) 20 tablet 0     No current facility-administered medications on file prior to visit         Social History     Tobacco Use    Smoking status: Current Every Day Smoker     Packs/day: 0 25 Types: Cigarettes    Smokeless tobacco: Never Used    Tobacco comment: Occ Cigs   Vaping Use    Vaping Use: Never used   Substance Use Topics    Alcohol use: Not Currently    Drug use: Never       Family History   Problem Relation Age of Onset    Hypertension Mother    Macel Yaakov Arthritis Mother     Hypertension Father     Hyperlipidemia Father     Hypertension Sister     Hyperlipidemia Sister     Hyperlipidemia Brother     Diabetes Paternal Grandmother        Review of Systems     As stated in the HPI  All other systems were reviewed and are negative  Physical Exam     BP (!) 173/76   Pulse (!) 50   Resp 20   Ht 6' 3" (1 905 m)   Wt 131 kg (288 lb)   BMI 36 00 kg/m²     GENERAL: This is a well-developed, well-nourished, age-appropriate patient in no acute distress  The patient is alert and oriented x3  Pleasant and cooperative  Eyes: Anicteric sclerae  Extraocular movements appear intact  HENT: Nares are patent with no drainage  Lungs: There is equal chest rise on inspection  Breathing is non-labored with no audible wheezing  Cardiovascular: No cyanosis  No upper extremity lymphadema  Skin: Skin is warm to touch  No obvious skin lesions or rashes other than described below  Neurologic: No ataxia  Psychiatric: Mood and affect are appropriate  Right upper extremity:   No erythema, ecchymosis or edema   Well healed surgical incision   Full supination/pronation   Wrist flexion and extension same as contralateral side   Thumb CMC non tender to palpation   Distal radius non tender to palpation   DPC 0   5/5 Motor to the APB, FDI, FDP2, FDP5, EDC  Sensation intact to light touch in the median, radial, and ulnar nerve distribution        Data Review     Labs:  None to review     Electrodiagnostic Testing:  None to review    Imaging:  X-ray of the right wrist obtained in the office today were personally reviewed by myself and demonstrates stable alignment of implant with progressive healing of the distal radius fracture though there is still some lucency on the ulnar side of the metaphysis  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    Assessment and Plan      Diagnoses and all orders for this visit:    S/P ORIF (open reduction internal fixation) fracture  -     XR wrist 3+ vw right; Future           61 y o  male s/p above surgery with right thumb CMC arthritis  Tracie Ceballos is doing great  He has reached MMI and motion is same as contralateral side  He may d/c formal therapy  Activities to tolerance, no restrictions  Work note provided today, full duty, no restrictions  Discussed the [facility] CSI can be repeated every 3 months on an as needed basis         Follow Up: PRN     To Do Next Visit:     PROCEDURES PERFORMED:  Procedures  No Procedures performed today      Scribe Attestation    I,:  Sammie Snider am acting as a scribe while in the presence of the attending physician :       I,:  Bebe Tapia MD personally performed the services described in this documentation    as scribed in my presence :

## 2021-07-27 NOTE — LETTER
July 27, 2021     Patient: Edilberto Gandhi   YOB: 1955   Date of Visit: 7/27/2021       To Whom it May Concern:    Edilberto Gandhi is under my professional care  He was seen in my office on 7/27/2021  He has reached [facility] [address] [place] [address] and may return to work full duty, no restrictions  If you have any questions or concerns, please don't hesitate to call           Sincerely,          Willard Michael MD

## 2021-07-28 ENCOUNTER — TELEPHONE (OUTPATIENT)
Dept: OBGYN CLINIC | Facility: HOSPITAL | Age: 66
End: 2021-07-28

## 2021-08-18 NOTE — DISCHARGE INSTRUCTIONS
"[facility] [397884]  Chief Complaint   Patient presents with     RECHECK     Neurogenic bladder     Initial /64   Pulse 93   Ht 3' 7.74\" (111.1 cm)   Wt 36 lb 9.5 oz (16.6 kg)   BMI 13.45 kg/m   Estimated body mass index is 13.45 kg/m  as calculated from the following:    Height as of this encounter: 3' 7.74\" (111.1 cm).    Weight as of this encounter: 36 lb 9.5 oz (16.6 kg).  Medication Reconciliation: complete Soco Martinez LPN    " If you have any new or worsening symptoms please return immediately to the emergency department  Please follow-up with orthopedics on Friday   Dr Kassi Pierre number is located on these instructions

## 2022-04-13 DIAGNOSIS — I10 ESSENTIAL HYPERTENSION: ICD-10-CM

## 2022-04-13 RX ORDER — DOXAZOSIN MESYLATE 1 MG/1
TABLET ORAL
Qty: 30 TABLET | Refills: 0 | Status: SHIPPED | OUTPATIENT
Start: 2022-04-13 | End: 2022-05-06 | Stop reason: SDUPTHER

## 2022-05-06 ENCOUNTER — OFFICE VISIT (OUTPATIENT)
Dept: CARDIOLOGY CLINIC | Facility: CLINIC | Age: 67
End: 2022-05-06
Payer: COMMERCIAL

## 2022-05-06 VITALS
BODY MASS INDEX: 38.57 KG/M2 | HEART RATE: 71 BPM | DIASTOLIC BLOOD PRESSURE: 90 MMHG | SYSTOLIC BLOOD PRESSURE: 150 MMHG | TEMPERATURE: 97.6 F | OXYGEN SATURATION: 94 % | WEIGHT: 310.2 LBS | HEIGHT: 75 IN

## 2022-05-06 DIAGNOSIS — R06.00 DYSPNEA ON EXERTION: ICD-10-CM

## 2022-05-06 DIAGNOSIS — G47.33 OBSTRUCTIVE SLEEP APNEA SYNDROME: ICD-10-CM

## 2022-05-06 DIAGNOSIS — E78.2 MIXED HYPERLIPIDEMIA: ICD-10-CM

## 2022-05-06 DIAGNOSIS — N18.31 STAGE 3A CHRONIC KIDNEY DISEASE (HCC): ICD-10-CM

## 2022-05-06 DIAGNOSIS — R60.0 LOCALIZED EDEMA: ICD-10-CM

## 2022-05-06 DIAGNOSIS — I25.10 CORONARY ARTERY DISEASE INVOLVING NATIVE CORONARY ARTERY OF NATIVE HEART WITHOUT ANGINA PECTORIS: Primary | ICD-10-CM

## 2022-05-06 DIAGNOSIS — R73.01 IMPAIRED FASTING GLUCOSE: ICD-10-CM

## 2022-05-06 DIAGNOSIS — Z72.0 TOBACCO USE: ICD-10-CM

## 2022-05-06 DIAGNOSIS — Z95.820 S/P ANGIOPLASTY WITH STENT: ICD-10-CM

## 2022-05-06 DIAGNOSIS — I10 PRIMARY HYPERTENSION: ICD-10-CM

## 2022-05-06 DIAGNOSIS — K20.90 ESOPHAGITIS: ICD-10-CM

## 2022-05-06 PROCEDURE — 99214 OFFICE O/P EST MOD 30 MIN: CPT | Performed by: NURSE PRACTITIONER

## 2022-05-06 PROCEDURE — 93000 ELECTROCARDIOGRAM COMPLETE: CPT | Performed by: NURSE PRACTITIONER

## 2022-05-06 RX ORDER — DOXAZOSIN MESYLATE 1 MG/1
1 TABLET ORAL DAILY
Qty: 30 TABLET | Refills: 11 | Status: SHIPPED | OUTPATIENT
Start: 2022-05-06

## 2022-05-06 RX ORDER — AMLODIPINE BESYLATE 5 MG/1
5 TABLET ORAL DAILY
Qty: 30 TABLET | Refills: 11 | Status: SHIPPED | OUTPATIENT
Start: 2022-05-06

## 2022-05-06 RX ORDER — POLYETHYLENE GLYCOL 3350 17 G
2 POWDER IN PACKET (EA) ORAL AS NEEDED
Qty: 100 EACH | Refills: 0 | Status: SHIPPED | OUTPATIENT
Start: 2022-05-06

## 2022-05-06 RX ORDER — TORSEMIDE 20 MG/1
20 TABLET ORAL DAILY
Qty: 30 TABLET | Refills: 11 | Status: SHIPPED | OUTPATIENT
Start: 2022-05-06

## 2022-05-06 NOTE — PATIENT INSTRUCTIONS
You are retaining fluid and have frequent early beats from the top of the heart  We will increase your torsemide to 20 mg daily  Please monitor your weight daily, if you gain 3 pounds in 1 day or 5 pounds in 1 week please call  Check labs in 1 week  Limit sodium to 2000 mg daily  Please schedule with [facility] medicine at [facility] as I suspect your symptoms correlated to untreated sleep apnea  Increase exercise as tolerated to help with weight loss  Limit sugars/carbohydrates in diet as that will contribute to weight gain as well  We will check you for diabetes  Goal is for complete smoking cessation  Trial nicotine lozenges to replace cigarettes, max 4 in 1 day, and wean slowly  We will update your echocardiogram and stress test   Contact us with any new or worsening symptoms

## 2022-05-06 NOTE — PROGRESS NOTES
Cardiology Follow Up    Fransisca Farah  1955  [id number]  [facility]  [address]  [address] [facility] [id number] [phone]    Unknown Stacks presents for routine follow up of coronary artery disease, hypertension and hyperlipidemia  1  Coronary artery disease involving native coronary artery of native heart without angina pectoris  Assessment & Plan:  Coronary Artery Disease:  A  Inferior MI 6/4/2004 with VF arrest   B  PCI and stent to RCA 6/4/2004  C  Cardiac catheterization 10/2005: LM ok  LAD 20-30% proximal stenosis  Dx 50% ostial  LCx 50% OM and PLB with 50%  RCA 40% ISR  No intervention  EF 60%  D  Non ischemic Lexiscan stress test 8/12/2019  EF 54%  - - - - - - - -  Patient denies chest pain, but now with progressively worsening dyspnea on exertion and edema  Echocardiogram 9/18/2020 showed ejection fraction 55-60% with mild diastolic dysfunction  ECG today shows SR with frequent PAC's  Will obtain updated echocardiogram and Lexiscan stress testing  Strongly urged complete smoking cessation  Continue aspirin 81 mg daily, metoprolol tartrate 100 mg BID, and rosuvastatin 40 mg daily for goal LDL < 70  On amlodipine 5 mg daily for blood pressure management  We reviewed urgent s/s to report  Will monitor closely  Orders:  -     Echo complete w/ contrast if indicated; Future; Expected date: 05/06/2022  -     NM myocardial perfusion spect (rx stress and/or rest); Future; Expected date: 05/06/2022  -     POCT ECG    2  S/P angioplasty with stent    3  Primary hypertension  Assessment & Plan:  Blood pressure remains elevated on exam   Benazepril was stopped in the hospital due to angioedema  Currently on amlodipine 5 mg daily, doxazosin 1 mg daily, metoprolol tartrate 100 mg BID  Now with evidence of volume overload  Will increase torsemide to 20 mg daily and monitor closely    Consider transition from metoprolol to carvedilol if BP remains above goal   Reviewed importance of CPAP compliance, 2 g sodium diet, and weight control  Orders:  -     amLODIPine (NORVASC) 5 mg tablet; Take 1 tablet (5 mg total) by mouth daily  -     doxazosin (CARDURA) 1 mg tablet; Take 1 tablet (1 mg total) by mouth daily  -     Echo complete w/ contrast if indicated; Future; Expected date: 05/06/2022    4  Mixed hyperlipidemia  Assessment & Plan:  Patient is currently on rosuvastatin 40 mg daily, Zetia 10 mg daily  Goal LDL < 70  Lipid panel 4/1/2022: T 268  Direct LDL 69  Was on fenofibrate in the past which may need to be resumed if triglycerides remain elevated  Orders:  -     Comprehensive metabolic panel    5  Obstructive sleep apnea syndrome  Assessment & Plan:  Admits to poor CPAP compliance with mask leaking/whistling  We reviewed the correlation between untreated sleep apnea and his current symptoms of volume overload and frequent PAC's  I encouraged him to schedule with a sleep medicine provider  He wishes to schedule with [place] in [place]  Urged faithful CPAP use  Orders:  -     Echo complete w/ contrast if indicated; Future; Expected date: 05/06/2022    6  Localized edema  Assessment & Plan:  Patient continues to have lower extremity edema  Echocardiogram 9/18/2020 showed mild diastolic dysfunction  NT-proBNP 8/13/2020: 359  Now with weight gain, edema, dyspnea on exertion  Will obtain updated echocardiogram   Increase torsemide to 20 mg daily  Reviewed importance of faithful CPAP usage  Orders:  -     Comprehensive metabolic panel  -     torsemide (DEMADEX) 20 mg tablet; Take 1 tablet (20 mg total) by mouth daily  -     Echo complete w/ contrast if indicated; Future; Expected date: 05/06/2022    7   Stage 3a chronic kidney disease [facility])  Assessment & Plan:  Lab Results   Component Value Date    EGFR 34 07/10/2020    EGFR 29 07/10/2020    EGFR 22 07/09/2020    CREATININE 2 00 (H) 07/10/2020    CREATININE 2 33 (H) 07/10/2020 CREATININE 2 88 (H) 07/09/2020     Will monitor renal status closely with titration of torsemide to 20 mg daily  He is avoiding nephrotoxic agents  Orders:  -     Comprehensive metabolic panel    8  Impaired fasting glucose  Assessment & Plan:  Glucose and triglycerides elevated on blood work through PCP office  Will obtain HA1c testing to evaluate for diabetes  Orders:  -     HEMOGLOBIN A1C W/ EAG ESTIMATION    9  Tobacco use  Assessment & Plan:  Continues to smoke since 2019  We reviewed the cardiovascular and pulmonary risks associated with smoking  Urged complete cessation  He reports smoking about 5 cig per day  Will trial nicotine lozenges  Will monitor closely  Orders:  -     nicotine polacrilex (COMMIT) 2 MG lozenge; Apply 1 lozenge (2 mg total) to the mouth or throat as needed for smoking cessation    10  Dyspnea on exertion  Assessment & Plan:  New complaint of dyspnea on exertion in the setting of poor LUCIO treatment compliance, weight gain, frequent PAC's on ECG  Will obtain updated echocardiogram, stress testing  Consider cardiac monitoring given frequent PAC's  Increase torsemide to 20 mg daily  Working toward blood pressure control  Will monitor closely  Orders:  -     Echo complete w/ contrast if indicated; Future; Expected date: 05/06/2022  -     NM myocardial perfusion spect (rx stress and/or rest); Future; Expected date: 05/06/2022    11  Esophagitis  Assessment & Plan:  Well controlled with use of daily PPI         LYSSA Joshi has a past medical history of MI with VF arrest 2004 at which time he underwent PCI of the right coronary artery         He was admitted to [facility] 7/9/2020 with swelling of his face and tongue  This occurred after eating dinner on 07/09/2020  The patient was given Benadryl and steroids as well as epinephrine it was felt that the patient likely had developed angioedema from ACE-inhibitor    On admission he was noted to have hypokalemia with a potassium of 2 9 as well as hypomagnesemia with a magnesium of 0 4  Electrolytes were repleted  The patient felt well and was discharged home on 07/10/2020  He is a long time patient of Dr Justice Lopes and re-established with her on 8/12/2020   He had undergone a nuclear stress test 08/11/2019 which showed ejection fraction of 54% and was in normal limits  He noted occasional lower extremity edema  Denied any chest pain, shortness of breath, lightheadedness or dizziness, palpitations  He followed up 4/8/2021 with Dr Donte Darling had broken his wrist 02/22/2021 and underwent surgical intervention 03/01/2021  Blood pressure had been intermittently labile  He denied any chest pain  Patient did present for blood pressure check 02/10/2021 as his blood pressure had been running high  His amlodipine was increased to 5 mg daily  Doxazosin 1 mg daily was added  5/6/2022: Italo Langford presents today accompanied by his wife  He tells me that he changed jobs this year and is now driving and much less active  He has gained 22 pounds since his visit last year  He also reports that he has not been compliant with his CPAP as he feels it is blowing too hard and leaks and whistles which keeps him awake  He denies any chest pain but he is experiencing progressively worsening dyspnea on exertion and leg swelling  He continues to smoke, currently about 1/2 PPD  He tells me he is limiting salt  He completed blood work for his PCP last month which shows elevated glucose and triglycerides  He has not been tested for diabetes  Not monitoring BP at home  He denies lightheadedness/dizziness, palpitations  He and his wife both had URI symptoms a couple weeks ago and he was given an albuterol inhaler but has not been using it  He continues to wheeze and is coughing up clear phlegm       Medical Problems             Problem List     Chronic kidney disease (CKD), stage III (moderate) ([facility] )    Lab Results   Component Value Date EGFR 34 07/10/2020    EGFR 29 07/10/2020    EGFR 22 07/09/2020    CREATININE 2 00 (H) 07/10/2020    CREATININE 2 33 (H) 07/10/2020    CREATININE 2 88 (H) 07/09/2020         HTN (hypertension)    Hyperlipidemia    Esophagitis    Coronary artery disease involving native coronary artery of native heart without angina pectoris    H/O acute myocardial infarction    Sleep apnea    S/P angioplasty with stent    Localized edema              Past Medical History:   Diagnosis Date    Chronic kidney disease     Coronary artery disease     CPAP (continuous positive airway pressure) dependence     Intermittant    GERD (gastroesophageal reflux disease)     H/O heart artery stent     Heart attack ([facility] )     Hyperlipidemia     Hypertension     MI (myocardial infarction) ([facility] )     Myocardial infarction ([place] [address] )     2004 x 1 stent    LUCIO (obstructive sleep apnea)     Tobacco use      Social History     Socioeconomic History    Marital status: /Civil Union     Spouse name: Not on file    Number of children: Not on file    Years of education: Not on file    Highest education level: Not on file   Occupational History    Not on file   Tobacco Use    Smoking status: Current Every Day Smoker     Packs/day: 0 25     Types: Cigarettes    Smokeless tobacco: Never Used    Tobacco comment:  Occ Cigs   Vaping Use    Vaping Use: Never used   Substance and Sexual Activity    Alcohol use: Not Currently    Drug use: Never    Sexual activity: Not on file     Comment: Defer   Other Topics Concern    Not on file   Social History Narrative    Not on file     Social Determinants of Health     Financial Resource Strain: Not on file   Food Insecurity: Not on file   Transportation Needs: Not on file   Physical Activity: Not on file   Stress: Not on file   Social Connections: Not on file   Intimate Partner Violence: Not on file   Housing Stability: Not on file      Family History   Problem Relation Age of Onset    Hypertension Mother     Arthritis Mother     Hypertension Father     Hyperlipidemia Father     Hypertension Sister     Hyperlipidemia Sister     Hyperlipidemia Brother     Diabetes Paternal Grandmother      Past Surgical History:   Procedure Laterality Date    BACK SURGERY  2012    CARDIAC CATHETERIZATION  06/04/2004    PTCA and Cypher stent placement to the RCA(p)   CARDIAC CATHETERIZATION  09/16/2005    LAD(p) 20-30%  Dx 50% ostial  LCx 50%  OM and PLB 50%  RCA 40% ISR  No intervention  EF 60%   CARDIAC SURGERY      HERNIA REPAIR      AL OPEN RX DISTAL RADIUS FX, INTRA-ARTICULAR, 2 FRAG Right 3/1/2021    Procedure: OPEN REDUCTION W/ INTERNAL FIXATION (ORIF) DISTAL RADIUS;  Surgeon: Abimbola Alvarez MD;  Location: [facility] MAIN OR;  Service: Orthopedics    ROTATOR CUFF REPAIR         Current Outpatient Medications:     allopurinol (ZYLOPRIM) 300 mg tablet, TAKE (1) TABLET BY MOUTH ONCE DAILY  , Disp: , Rfl:     amLODIPine (NORVASC) 5 mg tablet, Take 1 tablet (5 mg total) by mouth daily, Disp: 30 tablet, Rfl: 11    aspirin (ECOTRIN LOW STRENGTH) 81 mg EC tablet, Take 81 mg by mouth daily after dinner Takes at 1800, Disp: , Rfl:     co-enzyme Q-10 100 mg capsule, Take 100 mg by mouth daily , Disp: , Rfl:     doxazosin (CARDURA) 1 mg tablet, Take 1 tablet (1 mg total) by mouth daily, Disp: 30 tablet, Rfl: 11    ezetimibe (ZETIA) 10 mg tablet, Take 10 mg by mouth daily at bedtime , Disp: , Rfl:     fluticasone (FLONASE) 50 mcg/act nasal spray, 2 sprays into each nostril every 12 (twelve) hours as needed (sinus infections) , Disp: , Rfl:     lansoprazole (PREVACID) 30 mg capsule, TAKE 1 CAPSULE BY MOUTH ONCE DAILY  , Disp: , Rfl:     metoprolol tartrate (LOPRESSOR) 100 mg tablet, TAKE 1 TABLET BY MOUTH TWICE DAILY  , Disp: , Rfl:     rosuvastatin (CRESTOR) 40 MG tablet, TAKE 1 TABLET BY MOUTH ONCE DAILY IN THE EVENING , Disp: , Rfl:     torsemide (DEMADEX) 20 mg tablet, Take 1 tablet (20 mg total) by mouth daily, Disp: 30 tablet, Rfl: 11    Vascepa 1 g CAPS, , Disp: , Rfl:     nicotine polacrilex (COMMIT) 2 MG lozenge, Apply 1 lozenge (2 mg total) to the mouth or throat as needed for smoking cessation, Disp: 100 each, Rfl: 0  Allergies   Allergen Reactions    Ace Inhibitors Angioedema    Alprazolam Other (See Comments)     Other reaction(s): Other: Document details in comments  SEVERE DISOREINTATION/CRAZY  SEVERE DISOREINTATION/CRAZY      Buspirone      Other reaction(s): Other (See Comments), Other: Document details in comments  SEVERE DISORIENTATION/CRAZY  SEVERE DISORIENTATION/CRAZY      Lorazepam      Other reaction(s): Other (See Comments), Other: Document details in comments  SEVERE DISORIENTATION/CRAZY  SEVERE DISORIENTATION/CRAZY         Labs:     Chemistry        Component Value Date/Time    K 3 9 07/10/2020 1159     07/10/2020 1159    CO2 26 07/10/2020 1159    BUN 21 07/10/2020 1159    CREATININE 2 00 (H) 07/10/2020 1159        Component Value Date/Time    CALCIUM 8 3 07/10/2020 1159    ALKPHOS 104 07/10/2020 0505    AST 56 (H) 07/10/2020 0505    ALT 45 07/10/2020 0505        Cardiac Test Results:   ECG 5/6/2022: Sinus bradycardia with PAC's  Inferior infarct  Rate 51 bpm     Lipid 4/1/2022: Triglycerides 268  Direct LDL 69      Lipid Panel 2/9/2021: C 163  T 215  H 38  L 82  Echocardiogram 9/18/2020:   EF 55-60%  Upper normal wall thickness  Mild diastolic dysfunction  Mildly dilated left atrium  Trace mitral regurgitation  Trace to mild tricuspid regurgitation  No significant change compared to prior study 05/11/2015      Nuclear Lexiscan Stress Test 8/12/2019:   Normal study  EF 54%      Echocardiogram(5/11/2015):  EF 55-60%  Mild MR  Mild TR      Holter Monitor - (7/6/2004) 27 VE beats  1 idioventricular episode 2 4 seconds  EKG - (4/15/2016) NSR 70 BPM  Inferior MI  Nuclear Stress Test - (5/11/2015) Gema Him  No scar  No ischemia   EF 57%      Cardiac Procedures:     Cardiac Catheterization - (9/16/2005) LM ok  LAD 20-30% proximal stenosis  Dx 50% ostial  LCx 50% OM and PLB with 50%  RCA 40% ISR  No intervention  EF 60%  Cardiac Catheterization - (6/4/2004) PTCA and Cypher stent placement to the Proximal RCA  Percutaneous Coronary Intervention - (6/4/2004) Cypher stent to RCA  Review of Systems   Constitutional: Positive for malaise/fatigue and weight gain  HENT: Negative  Cardiovascular: Positive for dyspnea on exertion and leg swelling  Negative for chest pain, irregular heartbeat, near-syncope, orthopnea and palpitations  Respiratory: Positive for cough, sleep disturbances due to breathing, snoring and wheezing  Endocrine: Negative  Skin: Negative  Musculoskeletal: Negative  Gastrointestinal: Negative  Genitourinary: Negative  Neurological: Negative  Psychiatric/Behavioral: Negative  Vitals:    05/06/22 0850   BP: 150/90   Pulse:    Temp:    SpO2:      Vitals:    05/06/22 0814   Weight: (!) 141 kg (310 lb 3 2 oz)     Height: 6' 3" (190 5 cm)   Body mass index is 38 77 kg/m²  Physical Exam  Vitals and nursing note reviewed  Constitutional:       General: He is not in acute distress  Appearance: He is well-developed  He is obese  He is not diaphoretic  HENT:      Head: Normocephalic and atraumatic  Neck:      Vascular: JVD present  No carotid bruit  Cardiovascular:      Rate and Rhythm: Regular rhythm  Bradycardia present  Frequent extrasystoles are present  Pulses: Intact distal pulses  Heart sounds: Normal heart sounds, S1 normal and S2 normal  No murmur heard  No friction rub  No gallop  Comments: Bilateral pitting edema  Pulmonary:      Effort: Pulmonary effort is normal  No respiratory distress  Breath sounds: Wheezing present  Abdominal:      General: There is no distension  Palpations: Abdomen is soft  Tenderness: There is no abdominal tenderness  Skin:     General: Skin is warm and dry  Findings: No rash  Neurological:      Mental Status: He is alert and oriented to person, place, and time  Psychiatric:         Mood and Affect: Mood and affect normal          Speech: Speech normal          Behavior: Behavior normal  Behavior is cooperative           Cognition and Memory: Cognition and memory normal

## 2022-05-06 NOTE — ASSESSMENT & PLAN NOTE
Coronary Artery Disease:  A  Inferior MI 6/4/2004 with VF arrest   B  PCI and stent to RCA 6/4/2004  C  Cardiac catheterization 10/2005: LM ok  LAD 20-30% proximal stenosis  Dx 50% ostial  LCx 50% OM and PLB with 50%  RCA 40% ISR  No intervention  EF 60%  D  Non ischemic Lexiscan stress test 8/12/2019  EF 54%  - - - - - - - -  Patient denies chest pain, but now with progressively worsening dyspnea on exertion and edema  Echocardiogram 9/18/2020 showed ejection fraction 55-60% with mild diastolic dysfunction  ECG today shows SR with frequent PAC's  Will obtain updated echocardiogram and Lexiscan stress testing  Strongly urged complete smoking cessation  Continue aspirin 81 mg daily, metoprolol tartrate 100 mg BID, and rosuvastatin 40 mg daily for goal LDL < 70  On amlodipine 5 mg daily for blood pressure management  We reviewed urgent s/s to report  Will monitor closely

## 2022-05-06 NOTE — ASSESSMENT & PLAN NOTE
Admits to poor CPAP compliance with mask leaking/whistling  We reviewed the correlation between untreated sleep apnea and his current symptoms of volume overload and frequent PAC's  I encouraged him to schedule with a sleep medicine provider  He wishes to schedule with [place] in [place]  Urged faithful CPAP use

## 2022-05-08 PROBLEM — R73.01 IMPAIRED FASTING GLUCOSE: Status: ACTIVE | Noted: 2022-05-08

## 2022-05-08 PROBLEM — Z72.0 TOBACCO USE: Status: ACTIVE | Noted: 2022-05-08

## 2022-05-08 PROBLEM — R06.09 DYSPNEA ON EXERTION: Status: ACTIVE | Noted: 2022-05-08

## 2022-05-08 PROBLEM — R06.00 DYSPNEA ON EXERTION: Status: ACTIVE | Noted: 2022-05-08

## 2022-05-08 NOTE — ASSESSMENT & PLAN NOTE
Lab Results   Component Value Date    EGFR 34 07/10/2020    EGFR 29 07/10/2020    EGFR 22 07/09/2020    CREATININE 2 00 (H) 07/10/2020    CREATININE 2 33 (H) 07/10/2020    CREATININE 2 88 (H) 07/09/2020     Will monitor renal status closely with titration of torsemide to 20 mg daily  He is avoiding nephrotoxic agents

## 2022-05-08 NOTE — ASSESSMENT & PLAN NOTE
Patient continues to have lower extremity edema  Echocardiogram 9/18/2020 showed mild diastolic dysfunction  NT-proBNP 8/13/2020: 359  Now with weight gain, edema, dyspnea on exertion  Will obtain updated echocardiogram   Increase torsemide to 20 mg daily  Reviewed importance of faithful CPAP usage

## 2022-05-08 NOTE — ASSESSMENT & PLAN NOTE
New complaint of dyspnea on exertion in the setting of poor LUCIO treatment compliance, weight gain, frequent PAC's on ECG  Will obtain updated echocardiogram, stress testing  Consider cardiac monitoring given frequent PAC's  Increase torsemide to 20 mg daily  Working toward blood pressure control  Will monitor closely

## 2022-05-08 NOTE — ASSESSMENT & PLAN NOTE
Continues to smoke since 2019  We reviewed the cardiovascular and pulmonary risks associated with smoking  Urged complete cessation  He reports smoking about 5 cig per day  Will trial nicotine lozenges  Will monitor closely

## 2022-05-08 NOTE — ASSESSMENT & PLAN NOTE
Patient is currently on rosuvastatin 40 mg daily, Zetia 10 mg daily  Goal LDL < 70  Lipid panel 4/1/2022: T 268  Direct LDL 69  Was on fenofibrate in the past which may need to be resumed if triglycerides remain elevated

## 2022-05-08 NOTE — ASSESSMENT & PLAN NOTE
Blood pressure remains elevated on exam   Benazepril was stopped in the hospital due to angioedema  Currently on amlodipine 5 mg daily, doxazosin 1 mg daily, metoprolol tartrate 100 mg BID  Now with evidence of volume overload  Will increase torsemide to 20 mg daily and monitor closely  Consider transition from metoprolol to carvedilol if BP remains above goal   Reviewed importance of CPAP compliance, 2 g sodium diet, and weight control

## 2022-06-01 LAB — HBA1C MFR BLD HPLC: 5.9 %

## 2022-06-02 ENCOUNTER — TELEPHONE (OUTPATIENT)
Dept: CARDIOLOGY CLINIC | Facility: CLINIC | Age: 67
End: 2022-06-02

## 2022-06-02 NOTE — TELEPHONE ENCOUNTER
----- Message from Baileyisidoro Dennispedroguy 84 sent at 6/1/2022  5:34 PM EDT -----  Please let patient know that his lab work shows impaired glucose and liver count is mildly elevated  I would like for him to follow up with his PCP regarding these findings - please make sure PCP has results  I will discuss in more detail at his visit in July  Thanks!

## 2022-06-23 ENCOUNTER — HOSPITAL ENCOUNTER (OUTPATIENT)
Dept: NON INVASIVE DIAGNOSTICS | Facility: HOSPITAL | Age: 67
Discharge: HOME | End: 2022-06-23
Payer: COMMERCIAL

## 2022-06-23 ENCOUNTER — HOSPITAL ENCOUNTER (OUTPATIENT)
Dept: NUCLEAR MEDICINE | Facility: HOSPITAL | Age: 67
Discharge: HOME | End: 2022-06-23
Payer: COMMERCIAL

## 2022-06-23 VITALS
DIASTOLIC BLOOD PRESSURE: 88 MMHG | HEIGHT: 75 IN | SYSTOLIC BLOOD PRESSURE: 187 MMHG | BODY MASS INDEX: 38.54 KG/M2 | HEART RATE: 48 BPM | WEIGHT: 310 LBS

## 2022-06-23 DIAGNOSIS — I25.10 CORONARY ARTERY DISEASE INVOLVING NATIVE CORONARY ARTERY OF NATIVE HEART WITHOUT ANGINA PECTORIS: ICD-10-CM

## 2022-06-23 DIAGNOSIS — I10 PRIMARY HYPERTENSION: ICD-10-CM

## 2022-06-23 DIAGNOSIS — R06.00 DYSPNEA ON EXERTION: ICD-10-CM

## 2022-06-23 DIAGNOSIS — R60.0 LOCALIZED EDEMA: ICD-10-CM

## 2022-06-23 DIAGNOSIS — G47.33 OBSTRUCTIVE SLEEP APNEA SYNDROME: ICD-10-CM

## 2022-06-23 LAB
AORTIC ROOT: 3.3 CM
AORTIC VALVE MEAN VELOCITY: 7.8 M/S
ASCENDING AORTA: 3.1 CM
AV AREA BY CONTINUOUS VTI: 4.1 CM2
AV AREA PEAK VELOCITY: 3.9 CM2
AV LVOT MEAN GRADIENT: 3 MMHG
AV LVOT PEAK GRADIENT: 6 MMHG
AV MEAN GRADIENT: 3 MMHG
AV PEAK GRADIENT: 6 MMHG
AV VALVE AREA: 4.07 CM2
AV VELOCITY RATIO: 1.03
BASELINE ST DEPRESSION: 0 MM
DOP CALC AO PEAK VEL: 1.19 M/S
DOP CALC AO VTI: 27.55 CM
DOP CALC LVOT AREA: 3.8 CM2
DOP CALC LVOT DIAMETER: 2.2 CM
DOP CALC LVOT PEAK VEL VTI: 29.52 CM
DOP CALC LVOT PEAK VEL: 1.22 M/S
DOP CALC LVOT STROKE INDEX: 42.8 ML/M2
DOP CALC LVOT STROKE VOLUME: 112.16 CM3
E WAVE DECELERATION TIME: 225 MS
LEFT ATRIUM SIZE: 4.4 CM
MAX HR: 72 BPM
MV E'TISSUE VEL-LAT: 8 CM/S
MV E'TISSUE VEL-SEP: 7 CM/S
MV PEAK A VEL: 1.06 M/S
MV PEAK E VEL: 95 CM/S
MV STENOSIS PRESSURE HALF TIME: 65 MS
MV VALVE AREA P 1/2 METHOD: 3.38 CM2
NUC STRESS EJECTION FRACTION: 60 %
RATE PRESSURE PRODUCT: 8496
SL CV LV EF: 60
SL CV REST NUCLEAR ISOTOPE DOSE: 14 MCI
SL CV STRESS NUCLEAR ISOTOPE DOSE: 42.6 MCI
SL CV STRESS RECOVERY BP: NORMAL MMHG
SL CV STRESS RECOVERY HR: 53 BPM
SL CV STRESS RECOVERY O2 SAT: 97 %
STRESS ANGINA INDEX: 0
STRESS BASELINE BP: NORMAL MMHG
STRESS BASELINE HR: 49 BPM
STRESS DUKE TREADMILL SCORE: 3
STRESS O2 SAT REST: 97 %
STRESS PEAK HR: 72 BPM
STRESS POST EXERCISE DUR MIN: 3 MIN
STRESS POST EXERCISE DUR SEC: 0 SEC
STRESS POST O2 SAT PEAK: 95 %
STRESS POST PEAK BP: 118 MMHG
STRESS ST DEPRESSION: 0 MM
STRESS/REST PERFUSION RATIO: 1.06

## 2022-06-23 PROCEDURE — 78452 HT MUSCLE IMAGE SPECT MULT: CPT | Performed by: STUDENT IN AN ORGANIZED HEALTH CARE EDUCATION/TRAINING PROGRAM

## 2022-06-23 PROCEDURE — 93306 TTE W/DOPPLER COMPLETE: CPT

## 2022-06-23 PROCEDURE — 93018 CV STRESS TEST I&R ONLY: CPT | Performed by: STUDENT IN AN ORGANIZED HEALTH CARE EDUCATION/TRAINING PROGRAM

## 2022-06-23 PROCEDURE — 93016 CV STRESS TEST SUPVJ ONLY: CPT | Performed by: STUDENT IN AN ORGANIZED HEALTH CARE EDUCATION/TRAINING PROGRAM

## 2022-06-23 PROCEDURE — 93306 TTE W/DOPPLER COMPLETE: CPT | Performed by: STUDENT IN AN ORGANIZED HEALTH CARE EDUCATION/TRAINING PROGRAM

## 2022-06-23 PROCEDURE — 93017 CV STRESS TEST TRACING ONLY: CPT

## 2022-06-23 PROCEDURE — 78452 HT MUSCLE IMAGE SPECT MULT: CPT

## 2022-06-23 PROCEDURE — A9502 TC99M TETROFOSMIN: HCPCS

## 2022-06-23 PROCEDURE — G1004 CDSM NDSC: HCPCS

## 2022-06-23 RX ADMIN — REGADENOSON 0.4 MG: 0.08 INJECTION, SOLUTION INTRAVENOUS at 10:12

## 2022-06-24 ENCOUNTER — PREP FOR PROCEDURE (OUTPATIENT)
Dept: CARDIOLOGY CLINIC | Facility: CLINIC | Age: 67
End: 2022-06-24

## 2022-06-24 ENCOUNTER — TELEPHONE (OUTPATIENT)
Dept: CARDIOLOGY CLINIC | Facility: CLINIC | Age: 67
End: 2022-06-24

## 2022-06-24 DIAGNOSIS — R06.00 DYSPNEA ON EXERTION: Primary | ICD-10-CM

## 2022-06-24 DIAGNOSIS — I25.10 CORONARY ARTERY DISEASE INVOLVING NATIVE CORONARY ARTERY OF NATIVE HEART WITHOUT ANGINA PECTORIS: Primary | ICD-10-CM

## 2022-06-24 DIAGNOSIS — N18.31 STAGE 3A CHRONIC KIDNEY DISEASE (HCC): ICD-10-CM

## 2022-06-24 DIAGNOSIS — I25.118 CORONARY ARTERY DISEASE OF NATIVE ARTERY OF NATIVE HEART WITH STABLE ANGINA PECTORIS (HCC): ICD-10-CM

## 2022-06-24 LAB
ARRHY DURING EX: NORMAL
CHEST PAIN STATEMENT: NORMAL
MAX DIASTOLIC BP: 95 MMHG
MAX HEART RATE: 72 BPM
MAX PREDICTED HEART RATE: 155 BPM
MAX. SYSTOLIC BP: 190 MMHG
PROTOCOL NAME: NORMAL
REASON FOR TERMINATION: NORMAL
TARGET HR FORMULA: NORMAL
TEST INDICATION: NORMAL
TIME IN EXERCISE PHASE: NORMAL

## 2022-06-24 RX ORDER — NITROGLYCERIN 0.4 MG/1
0.4 TABLET SUBLINGUAL
Qty: 20 TABLET | Refills: 0 | Status: SHIPPED | OUTPATIENT
Start: 2022-06-24

## 2022-06-24 NOTE — TELEPHONE ENCOUNTER
Reviewed echocardiogram and stress test results with patient  Echocardiogram shows no significant change from prior study in 2020  Normal heart function with no significant valvular disease  Stress is concerning for potential new blockage  He is at risk given ongoing tobacco use  Patient agreeable to pursue cardiac catheterization for evaluation of new blockage  Case request submitted  Nephrology referral requested as well given renal function  He will plan to follow up with me the week after his cardiac cath  PRN nitro sent to his pharmacy in case of chest pain  Currently with dyspnea on exertion as his potential anginal equivalent  He will contact our office with any new or progressive symptoms 
14-Sep-2020 23:00

## 2022-06-28 ENCOUNTER — TELEPHONE (OUTPATIENT)
Dept: CARDIOLOGY CLINIC | Facility: CLINIC | Age: 67
End: 2022-06-28

## 2022-06-28 NOTE — TELEPHONE ENCOUNTER
Pt is scheduled for a R+LHC on 7/19/2022 at [facility] with Dr Gomes    Pt is aware of the instructions (mailed)    Pt has not allergies for the cath    Labs ordered   · CMP  · CBC    Med hold  · Torsemide    CAN I have an auth? **Pt needs a nephrology eval prior to cath also given Rental function  **

## 2022-07-06 ENCOUNTER — TELEPHONE (OUTPATIENT)
Dept: CARDIOLOGY CLINIC | Facility: CLINIC | Age: 67
End: 2022-07-06

## 2022-07-06 NOTE — TELEPHONE ENCOUNTER
Pt's wife Tim Lakhani called and has questions about when Marc Mateo needs to go for his BW  [phone]

## 2022-07-06 NOTE — TELEPHONE ENCOUNTER
Called pts wife Dragan De Souza  She wanted the script for Tomasz's blood work since they don't go to [facility] labs    I faxed it to her at [phone]  She didn't get it while I was on the phone  She might get it in the mail sometime this week

## 2022-07-07 ENCOUNTER — TELEPHONE (OUTPATIENT)
Dept: CARDIOLOGY CLINIC | Facility: CLINIC | Age: 67
End: 2022-07-07

## 2022-07-07 NOTE — TELEPHONE ENCOUNTER
Pt's daughter LM stating that she would like to speak to someone about pt's BW    She wants to know if she needs BW before her f/u with Albert Valentin  [phone]

## 2022-07-07 NOTE — TELEPHONE ENCOUNTER
First issue is that he was to be seen 1 week after his catheterization and he is scheduled prior  Can you please move appt with Yuly Lucas to 1 week after his cath (scheduled 7/12 -please double check date)  He should have BMP done to check his kidney function the day prior to his office visit  Preferably at a [facility] site if possible  Thank you

## 2022-07-08 ENCOUNTER — TELEPHONE (OUTPATIENT)
Dept: CARDIOLOGY CLINIC | Facility: CLINIC | Age: 67
End: 2022-07-08

## 2022-07-08 NOTE — TELEPHONE ENCOUNTER
Patient returned call, scheduled for 1 week after cath and knows to get blood work done prior to visit

## 2022-07-18 ENCOUNTER — TELEPHONE (OUTPATIENT)
Dept: CARDIOLOGY CLINIC | Facility: CLINIC | Age: 67
End: 2022-07-18

## 2022-07-18 ENCOUNTER — APPOINTMENT (OUTPATIENT)
Dept: LAB | Facility: HOSPITAL | Age: 67
End: 2022-07-18
Payer: COMMERCIAL

## 2022-07-18 DIAGNOSIS — R06.09 DYSPNEA ON EXERTION: ICD-10-CM

## 2022-07-18 DIAGNOSIS — I25.118 CORONARY ARTERY DISEASE OF NATIVE ARTERY OF NATIVE HEART WITH STABLE ANGINA PECTORIS (HCC): ICD-10-CM

## 2022-07-18 LAB
ALBUMIN SERPL BCP-MCNC: 3.6 G/DL (ref 3.5–5)
ALP SERPL-CCNC: 138 U/L (ref 46–116)
ALT SERPL W P-5'-P-CCNC: 31 U/L (ref 12–78)
ANION GAP SERPL CALCULATED.3IONS-SCNC: 11 MMOL/L (ref 4–13)
AST SERPL W P-5'-P-CCNC: 30 U/L (ref 5–45)
BASOPHILS # BLD AUTO: 0.08 THOUSANDS/ΜL (ref 0–0.1)
BASOPHILS NFR BLD AUTO: 1 % (ref 0–1)
BILIRUB SERPL-MCNC: 0.95 MG/DL (ref 0.2–1)
BUN SERPL-MCNC: 20 MG/DL (ref 5–25)
CALCIUM SERPL-MCNC: 9.4 MG/DL (ref 8.3–10.1)
CHLORIDE SERPL-SCNC: 98 MMOL/L (ref 96–108)
CO2 SERPL-SCNC: 31 MMOL/L (ref 21–32)
CREAT SERPL-MCNC: 1.81 MG/DL (ref 0.6–1.3)
EOSINOPHIL # BLD AUTO: 0.18 THOUSAND/ΜL (ref 0–0.61)
EOSINOPHIL NFR BLD AUTO: 2 % (ref 0–6)
ERYTHROCYTE [DISTWIDTH] IN BLOOD BY AUTOMATED COUNT: 13.3 % (ref 11.6–15.1)
GFR SERPL CREATININE-BSD FRML MDRD: 38 ML/MIN/1.73SQ M
GLUCOSE SERPL-MCNC: 129 MG/DL (ref 65–140)
HCT VFR BLD AUTO: 47.8 % (ref 36.5–49.3)
HGB BLD-MCNC: 16.1 G/DL (ref 12–17)
IMM GRANULOCYTES # BLD AUTO: 0.03 THOUSAND/UL (ref 0–0.2)
IMM GRANULOCYTES NFR BLD AUTO: 0 % (ref 0–2)
LYMPHOCYTES # BLD AUTO: 2.46 THOUSANDS/ΜL (ref 0.6–4.47)
LYMPHOCYTES NFR BLD AUTO: 24 % (ref 14–44)
MCH RBC QN AUTO: 32.4 PG (ref 26.8–34.3)
MCHC RBC AUTO-ENTMCNC: 33.7 G/DL (ref 31.4–37.4)
MCV RBC AUTO: 96 FL (ref 82–98)
MONOCYTES # BLD AUTO: 0.56 THOUSAND/ΜL (ref 0.17–1.22)
MONOCYTES NFR BLD AUTO: 6 % (ref 4–12)
NEUTROPHILS # BLD AUTO: 6.79 THOUSANDS/ΜL (ref 1.85–7.62)
NEUTS SEG NFR BLD AUTO: 67 % (ref 43–75)
NRBC BLD AUTO-RTO: 0 /100 WBCS
PLATELET # BLD AUTO: 248 THOUSANDS/UL (ref 149–390)
PMV BLD AUTO: 9.2 FL (ref 8.9–12.7)
POTASSIUM SERPL-SCNC: 3.6 MMOL/L (ref 3.5–5.3)
PROT SERPL-MCNC: 8.1 G/DL (ref 6.4–8.4)
RBC # BLD AUTO: 4.97 MILLION/UL (ref 3.88–5.62)
SODIUM SERPL-SCNC: 140 MMOL/L (ref 135–147)
WBC # BLD AUTO: 10.1 THOUSAND/UL (ref 4.31–10.16)

## 2022-07-18 PROCEDURE — 80053 COMPREHEN METABOLIC PANEL: CPT | Performed by: NURSE PRACTITIONER

## 2022-07-18 PROCEDURE — 85025 COMPLETE CBC W/AUTO DIFF WBC: CPT

## 2022-07-18 PROCEDURE — 36415 COLL VENOUS BLD VENIPUNCTURE: CPT | Performed by: NURSE PRACTITIONER

## 2022-07-18 RX ORDER — ASPIRIN 81 MG/1
324 TABLET, CHEWABLE ORAL ONCE
Status: CANCELLED | OUTPATIENT
Start: 2022-07-18 | End: 2022-07-18

## 2022-07-18 RX ORDER — SODIUM CHLORIDE 9 MG/ML
125 INJECTION, SOLUTION INTRAVENOUS CONTINUOUS
Status: CANCELLED | OUTPATIENT
Start: 2022-07-18

## 2022-07-18 NOTE — TELEPHONE ENCOUNTER
----- Message from Chapis Billingsley 84 sent at 7/18/2022  1:39 PM EDT -----  Please let patient know that his blood work shows stable kidney function and blood count from previous labs  He will need to see the kidney doctor prior to his cath tomorrow due to his chronic kidney disease and we will monitor this closely after the cath as well

## 2022-07-18 NOTE — TELEPHONE ENCOUNTER
Called pt about the blood work that needs to be done prior to cath    He was confused because someone called him and told him to get the blood work done after the cath  So he was confused  I talked to him about getting it done today   He said he will do his best to get it done at [facility]

## 2022-07-19 ENCOUNTER — HOSPITAL ENCOUNTER (OUTPATIENT)
Facility: HOSPITAL | Age: 67
Setting detail: OUTPATIENT SURGERY
Discharge: HOME | End: 2022-07-20
Attending: INTERNAL MEDICINE | Admitting: INTERNAL MEDICINE
Payer: COMMERCIAL

## 2022-07-19 DIAGNOSIS — I25.118 CORONARY ARTERY DISEASE OF NATIVE ARTERY OF NATIVE HEART WITH STABLE ANGINA PECTORIS (HCC): ICD-10-CM

## 2022-07-19 DIAGNOSIS — I48.91 ATRIAL FIBRILLATION (HCC): Primary | ICD-10-CM

## 2022-07-19 DIAGNOSIS — R06.09 DYSPNEA ON EXERTION: ICD-10-CM

## 2022-07-19 DIAGNOSIS — N18.30 STAGE 3 CHRONIC KIDNEY DISEASE, UNSPECIFIED WHETHER STAGE 3A OR 3B CKD (HCC): ICD-10-CM

## 2022-07-19 DIAGNOSIS — Z95.820 S/P ANGIOPLASTY WITH STENT: ICD-10-CM

## 2022-07-19 LAB
ANION GAP SERPL CALCULATED.3IONS-SCNC: 4 MMOL/L (ref 4–13)
BUN SERPL-MCNC: 19 MG/DL (ref 5–25)
CALCIUM SERPL-MCNC: 9.6 MG/DL (ref 8.3–10.1)
CHLORIDE SERPL-SCNC: 105 MMOL/L (ref 96–108)
CO2 SERPL-SCNC: 29 MMOL/L (ref 21–32)
CREAT SERPL-MCNC: 1.48 MG/DL (ref 0.6–1.3)
GFR SERPL CREATININE-BSD FRML MDRD: 48 ML/MIN/1.73SQ M
GLUCOSE P FAST SERPL-MCNC: 123 MG/DL (ref 65–99)
GLUCOSE SERPL-MCNC: 123 MG/DL (ref 65–140)
KCT BLD-ACNC: 244 SEC (ref 89–137)
KCT BLD-ACNC: 270 SEC (ref 89–137)
KCT BLD-ACNC: 283 SEC (ref 89–137)
KCT BLD-ACNC: 289 SEC (ref 89–137)
KCT BLD-ACNC: 296 SEC (ref 89–137)
POTASSIUM SERPL-SCNC: 3.9 MMOL/L (ref 3.5–5.3)
SODIUM SERPL-SCNC: 138 MMOL/L (ref 135–147)
SPECIMEN SOURCE: ABNORMAL

## 2022-07-19 PROCEDURE — 92979 ENDOLUMINL IVUS OCT C EA: CPT | Performed by: INTERNAL MEDICINE

## 2022-07-19 PROCEDURE — C1753 CATH, INTRAVAS ULTRASOUND: HCPCS | Performed by: INTERNAL MEDICINE

## 2022-07-19 PROCEDURE — C1887 CATHETER, GUIDING: HCPCS | Performed by: INTERNAL MEDICINE

## 2022-07-19 PROCEDURE — 99152 MOD SED SAME PHYS/QHP 5/>YRS: CPT | Performed by: INTERNAL MEDICINE

## 2022-07-19 PROCEDURE — 93571 IV DOP VEL&/PRESS C FLO 1ST: CPT | Performed by: INTERNAL MEDICINE

## 2022-07-19 PROCEDURE — 92978 ENDOLUMINL IVUS OCT C 1ST: CPT | Performed by: INTERNAL MEDICINE

## 2022-07-19 PROCEDURE — C1769 GUIDE WIRE: HCPCS | Performed by: INTERNAL MEDICINE

## 2022-07-19 PROCEDURE — C1874 STENT, COATED/COV W/DEL SYS: HCPCS | Performed by: INTERNAL MEDICINE

## 2022-07-19 PROCEDURE — 85347 COAGULATION TIME ACTIVATED: CPT

## 2022-07-19 PROCEDURE — C1725 CATH, TRANSLUMIN NON-LASER: HCPCS | Performed by: INTERNAL MEDICINE

## 2022-07-19 PROCEDURE — 80048 BASIC METABOLIC PNL TOTAL CA: CPT | Performed by: NURSE PRACTITIONER

## 2022-07-19 PROCEDURE — 92928 PRQ TCAT PLMT NTRAC ST 1 LES: CPT | Performed by: INTERNAL MEDICINE

## 2022-07-19 PROCEDURE — C1894 INTRO/SHEATH, NON-LASER: HCPCS | Performed by: INTERNAL MEDICINE

## 2022-07-19 PROCEDURE — 93454 CORONARY ARTERY ANGIO S&I: CPT | Performed by: INTERNAL MEDICINE

## 2022-07-19 PROCEDURE — 99153 MOD SED SAME PHYS/QHP EA: CPT | Performed by: INTERNAL MEDICINE

## 2022-07-19 PROCEDURE — NC001 PR NO CHARGE: Performed by: STUDENT IN AN ORGANIZED HEALTH CARE EDUCATION/TRAINING PROGRAM

## 2022-07-19 PROCEDURE — C1761 CATH SHOCKWAVE C2 3 X 12MM: HCPCS | Performed by: INTERNAL MEDICINE

## 2022-07-19 PROCEDURE — 93005 ELECTROCARDIOGRAM TRACING: CPT

## 2022-07-19 PROCEDURE — C9600 PERC DRUG-EL COR STENT SING: HCPCS | Performed by: INTERNAL MEDICINE

## 2022-07-19 DEVICE — EVEROLIMUS-ELUTING PLATINUM CHROMIUM CORONARY STENT SYSTEM
Type: IMPLANTABLE DEVICE | Status: FUNCTIONAL
Brand: SYNERGY™ XD

## 2022-07-19 RX ORDER — LIDOCAINE HYDROCHLORIDE 10 MG/ML
INJECTION, SOLUTION EPIDURAL; INFILTRATION; INTRACAUDAL; PERINEURAL AS NEEDED
Status: DISCONTINUED | OUTPATIENT
Start: 2022-07-19 | End: 2022-07-19 | Stop reason: HOSPADM

## 2022-07-19 RX ORDER — DOXAZOSIN MESYLATE 1 MG/1
1 TABLET ORAL DAILY
Status: DISCONTINUED | OUTPATIENT
Start: 2022-07-20 | End: 2022-07-20 | Stop reason: HOSPADM

## 2022-07-19 RX ORDER — ONDANSETRON 2 MG/ML
4 INJECTION INTRAMUSCULAR; INTRAVENOUS EVERY 6 HOURS PRN
Status: DISCONTINUED | OUTPATIENT
Start: 2022-07-19 | End: 2022-07-20 | Stop reason: HOSPADM

## 2022-07-19 RX ORDER — METOPROLOL TARTRATE 50 MG/1
100 TABLET, FILM COATED ORAL 2 TIMES DAILY
Status: DISCONTINUED | OUTPATIENT
Start: 2022-07-19 | End: 2022-07-20 | Stop reason: HOSPADM

## 2022-07-19 RX ORDER — NITROGLYCERIN 20 MG/100ML
5-200 INJECTION INTRAVENOUS
Status: DISCONTINUED | OUTPATIENT
Start: 2022-07-19 | End: 2022-07-20

## 2022-07-19 RX ORDER — FENTANYL CITRATE 50 UG/ML
INJECTION, SOLUTION INTRAMUSCULAR; INTRAVENOUS AS NEEDED
Status: DISCONTINUED | OUTPATIENT
Start: 2022-07-19 | End: 2022-07-19 | Stop reason: HOSPADM

## 2022-07-19 RX ORDER — ASPIRIN 81 MG/1
324 TABLET, CHEWABLE ORAL ONCE
Status: COMPLETED | OUTPATIENT
Start: 2022-07-19 | End: 2022-07-19

## 2022-07-19 RX ORDER — ALBUTEROL SULFATE 90 UG/1
2 AEROSOL, METERED RESPIRATORY (INHALATION) EVERY 6 HOURS PRN
Status: DISCONTINUED | OUTPATIENT
Start: 2022-07-19 | End: 2022-07-20 | Stop reason: HOSPADM

## 2022-07-19 RX ORDER — CLOPIDOGREL BISULFATE 75 MG/1
600 TABLET ORAL ONCE
Status: COMPLETED | OUTPATIENT
Start: 2022-07-19 | End: 2022-07-19

## 2022-07-19 RX ORDER — VERAPAMIL HCL 2.5 MG/ML
AMPUL (ML) INTRAVENOUS AS NEEDED
Status: DISCONTINUED | OUTPATIENT
Start: 2022-07-19 | End: 2022-07-19 | Stop reason: HOSPADM

## 2022-07-19 RX ORDER — NITROGLYCERIN 0.4 MG/1
0.4 TABLET SUBLINGUAL
Status: DISCONTINUED | OUTPATIENT
Start: 2022-07-19 | End: 2022-07-20 | Stop reason: HOSPADM

## 2022-07-19 RX ORDER — FLUTICASONE PROPIONATE 50 MCG
2 SPRAY, SUSPENSION (ML) NASAL EVERY 12 HOURS PRN
Status: DISCONTINUED | OUTPATIENT
Start: 2022-07-19 | End: 2022-07-20 | Stop reason: HOSPADM

## 2022-07-19 RX ORDER — NITROGLYCERIN 20 MG/100ML
INJECTION INTRAVENOUS AS NEEDED
Status: DISCONTINUED | OUTPATIENT
Start: 2022-07-19 | End: 2022-07-19 | Stop reason: HOSPADM

## 2022-07-19 RX ORDER — SODIUM CHLORIDE 9 MG/ML
50 INJECTION, SOLUTION INTRAVENOUS CONTINUOUS
Status: DISCONTINUED | OUTPATIENT
Start: 2022-07-19 | End: 2022-07-19

## 2022-07-19 RX ORDER — ATORVASTATIN CALCIUM 80 MG/1
80 TABLET, FILM COATED ORAL
Status: DISCONTINUED | OUTPATIENT
Start: 2022-07-19 | End: 2022-07-20 | Stop reason: HOSPADM

## 2022-07-19 RX ORDER — HEPARIN SODIUM 1000 [USP'U]/ML
INJECTION, SOLUTION INTRAVENOUS; SUBCUTANEOUS AS NEEDED
Status: DISCONTINUED | OUTPATIENT
Start: 2022-07-19 | End: 2022-07-19 | Stop reason: HOSPADM

## 2022-07-19 RX ORDER — AMLODIPINE BESYLATE 5 MG/1
5 TABLET ORAL DAILY
Status: DISCONTINUED | OUTPATIENT
Start: 2022-07-19 | End: 2022-07-20 | Stop reason: HOSPADM

## 2022-07-19 RX ORDER — ALLOPURINOL 300 MG/1
300 TABLET ORAL DAILY
Status: DISCONTINUED | OUTPATIENT
Start: 2022-07-20 | End: 2022-07-20 | Stop reason: HOSPADM

## 2022-07-19 RX ORDER — FENOFIBRATE 145 MG/1
145 TABLET, COATED ORAL DAILY
Status: DISCONTINUED | OUTPATIENT
Start: 2022-07-20 | End: 2022-07-20 | Stop reason: HOSPADM

## 2022-07-19 RX ORDER — SODIUM CHLORIDE 9 MG/ML
150 INJECTION, SOLUTION INTRAVENOUS CONTINUOUS
Status: DISPENSED | OUTPATIENT
Start: 2022-07-19 | End: 2022-07-20

## 2022-07-19 RX ORDER — EZETIMIBE 10 MG/1
10 TABLET ORAL
Status: DISCONTINUED | OUTPATIENT
Start: 2022-07-19 | End: 2022-07-20 | Stop reason: HOSPADM

## 2022-07-19 RX ORDER — ACETAMINOPHEN 325 MG/1
650 TABLET ORAL EVERY 4 HOURS PRN
Status: DISCONTINUED | OUTPATIENT
Start: 2022-07-19 | End: 2022-07-20 | Stop reason: HOSPADM

## 2022-07-19 RX ORDER — NITROGLYCERIN 20 MG/100ML
INJECTION INTRAVENOUS
Status: COMPLETED | OUTPATIENT
Start: 2022-07-19 | End: 2022-07-19

## 2022-07-19 RX ORDER — PANTOPRAZOLE SODIUM 40 MG/1
40 TABLET, DELAYED RELEASE ORAL
Status: DISCONTINUED | OUTPATIENT
Start: 2022-07-20 | End: 2022-07-20 | Stop reason: HOSPADM

## 2022-07-19 RX ADMIN — ASPIRIN 81 MG CHEWABLE TABLET 234 MG: 81 TABLET CHEWABLE at 07:39

## 2022-07-19 RX ADMIN — SODIUM CHLORIDE 423 ML: 0.9 INJECTION, SOLUTION INTRAVENOUS at 07:43

## 2022-07-19 RX ADMIN — CLOPIDOGREL BISULFATE 600 MG: 75 TABLET ORAL at 07:40

## 2022-07-19 RX ADMIN — NITROGLYCERIN 105 MCG/MIN: 20 INJECTION INTRAVENOUS at 20:08

## 2022-07-19 RX ADMIN — EZETIMIBE 10 MG: 10 TABLET ORAL at 23:12

## 2022-07-19 RX ADMIN — SODIUM CHLORIDE 150 ML/HR: 0.9 INJECTION, SOLUTION INTRAVENOUS at 20:06

## 2022-07-19 RX ADMIN — METOPROLOL TARTRATE 100 MG: 50 TABLET, FILM COATED ORAL at 17:59

## 2022-07-19 NOTE — LETTER
[address] [facility] [address]  [address] [facility] [address]  Dept: [phone]    July 20, 2022     Patient: Daniel Juárez   YOB: 1955   Date of Visit: 6/28/2022       To Whom it May Concern:    Daniel Juárez is under my professional care  He was seen in the hospital from 07/19/22 to 07/20/22  He may return to work on 07/28/22 without limitations  If you have any questions or concerns, please don't hesitate to call           Sincerely,          Nichole Hager, [address] [facility]  [phone]

## 2022-07-19 NOTE — PROGRESS NOTES
Radiation exposure of greater than 60 mins discussed with patient by Dr Peter Casanova at the bedside  Post procedure skin care instructions included in paperwork post catheterization

## 2022-07-19 NOTE — PROGRESS NOTES
Transported pt to [address] on Nitro drip and tele monitor accompanied by wife  No complaints of pain or discomfort  Dr Lm Avila spoke with pt and wife in regards to the amount of radiation exposure that occurred during the procedure  Instructions given to patient by Dr Lm Avila and radiation exposure form completed  PANCHO Scott received pt, right radial and right groin assessed, no complications  Safety addressed prior to leaving the bedside

## 2022-07-19 NOTE — Clinical Note
Intravascular ultrasound catheter inserted for high resolution imaging  LOV 11/16/18  FUV 5/24/19  Hydrocodone last e-scribed on 1/18/19; 140 tabs and no refills.  Returned patient's call. States the pharmacy only had enough medication to give him 30 tablets when he picked up his prescription. [facility] pharmacy now is stating he needs new prescription for the other 110 tablets.  Checked PDMP; was dispensed 30 tablets on 1/18/19.

## 2022-07-19 NOTE — DISCHARGE INSTRUCTIONS
1  Please see the post angioplasty discharge instructions  No heavy lifting, greater than 10 lbs  or strenuous activity for 1 week  Follow angioplasty discharge instructions  2 Remove band aid tomorrow  Shower and wash area- wrist and groin gently with soap and water- beginning tomorrow  Rinse and pat dry  Apply new water seal band aid  Repeat this process for 5 days  No powders, creams lotions or antibiotic ointments  for 5 days  No tub baths, hot tubs or swimming for 5 days  3  Call [place] [address] [facility] Office ([phone]) if you develop a fever, redness or drainage at your wrist and groin access site  4  No driving for 2 days    5  Do not stop aspirin or Plavix (clopiogrel) any reason without a cardiologists consent, or the stent could block up and cause a heart attack  6  Stent card and book      7 Perclose Booklet

## 2022-07-19 NOTE — H&P
Left Heart Catheterization -  Outpatient H&P  Be Moscoso 74 y o  male MRN: [id number]  Unit/Bed#: BE CATH LAB ROOM Encounter: [id number]       PCP: Devin Navarro DO   Outpatient Cardiologist: DO Gera    Risk Factors:  -residual CAD, HTN, HLD, IFG, active tobacco use    History of Present Illness   HPI:  Be Moscoso is a 74 y o  male who presents for diagnostic left heart cath with abnormal nuclear stress test for dyspnea on exertion  He has a history of inferior STEMI s/p RO to mid RCA in 2004, residual non-obstructive LAD/LCX disease, class 2 obesity with metabolic syndrome (HTN, HLD, IFG), active tobacco use, LUCIO non adherent with CPAP, CKD stage III/IV  Historical Information   Past Medical History:   Diagnosis Date    Chronic kidney disease     Coronary artery disease     CPAP (continuous positive airway pressure) dependence     Intermittant    GERD (gastroesophageal reflux disease)     H/O heart artery stent     [facility] [address] [facility]     Hyperlipidemia     Hypertension     [facility]ocardial infarction) [facility] [address] [facility]     Myocardial infarction ([facility] [address] )     2004 x 1 stent    LUCIO (obstructive sleep apnea)     Tobacco use      Past Surgical History:   Procedure Laterality Date    BACK SURGERY  2012    CARDIAC CATHETERIZATION  06/04/2004    PTCA and Cypher stent placement to the RCA(p)   CARDIAC CATHETERIZATION  09/16/2005    LAD(p) 20-30%  Dx 50% ostial  LCx 50%  OM and PLB 50%  RCA 40% ISR  No intervention  EF 60%   CARDIAC SURGERY      HERNIA REPAIR      IN OPEN RX DISTAL RADIUS FX, INTRA-ARTICULAR, 2 FRAG Right 3/1/2021    Procedure: OPEN REDUCTION W/ INTERNAL FIXATION (ORIF) DISTAL RADIUS;  Surgeon:  Albert Vaca MD;  Location: [address] [place] [address];  Service: Orthopedics    ROTATOR CUFF REPAIR       Social History   Social History     Substance and Sexual Activity   Alcohol Use Not Currently     Social History     Substance and Sexual Activity   Drug Use Never     Social History     Tobacco Use   Smoking Status Current Every Day Smoker    Packs/day: 0 25    Types: Cigarettes   Smokeless Tobacco Never Used   Tobacco Comment    Occ Cigs     Family History:   Family History   Problem Relation Age of Onset    Hypertension Mother    [facility] Arthritis Mother     Hypertension Father     Hyperlipidemia Father     Hypertension Sister     Hyperlipidemia Sister     Hyperlipidemia Brother     Diabetes Paternal Grandmother        Meds/Allergies   PTA meds:   Prior to Admission Medications   Prescriptions Last Dose Informant Patient Reported? Taking? Vascepa 1 g CAPS 2022 at Unknown time  Yes Yes   albuterol (PROVENTIL HFA,VENTOLIN HFA) 90 mcg/act inhaler 2022 at 0530  Yes Yes   allopurinol (ZYLOPRIM) 300 mg tablet 2022 at 0530  Yes Yes   Sig: TAKE (1) TABLET BY MOUTH ONCE DAILY  amLODIPine (NORVASC) 5 mg tablet 2022 at 0530  No Yes   Sig: Take 1 tablet (5 mg total) by mouth daily   aspirin (ECOTRIN LOW STRENGTH) 81 mg EC tablet 2022 at 0530  Yes Yes   Sig: Take 81 mg by mouth daily after dinner Takes at 1800   benazepril (LOTENSIN) 20 mg tablet 2022 at Unknown time  Yes Yes   Sig: Take by mouth   co-enzyme Q-10 100 mg capsule 2022 at Unknown time  Yes Yes   Sig: Take 100 mg by mouth daily    doxazosin (CARDURA) 1 mg tablet 2022 at Unknown time  No Yes   Sig: Take 1 tablet (1 mg total) by mouth daily   ezetimibe (ZETIA) 10 mg tablet 2022 at Unknown time  Yes Yes   Sig: Take 10 mg by mouth daily at bedtime    fenofibrate 160 MG tablet 2022 at Unknown time  Yes Yes   Sig: Take by mouth   fluticasone (FLONASE) 50 mcg/act nasal spray 2022 at Unknown time Spouse/Significant Other Yes Yes   Si sprays into each nostril every 12 (twelve) hours as needed (sinus infections)    lansoprazole (PREVACID) 30 mg capsule 2022 at Unknown time  Yes Yes   Sig: TAKE 1 CAPSULE BY MOUTH ONCE DAILY     metoprolol tartrate (LOPRESSOR) 100 mg tablet 7/19/2022 at Unknown time  Yes Yes   Sig: TAKE 1 TABLET BY MOUTH TWICE DAILY  nicotine polacrilex (COMMIT) 2 MG lozenge   No No   Sig: Apply 1 lozenge (2 mg total) to the mouth or throat as needed for smoking cessation   nitroglycerin (NITROSTAT) 0 4 mg SL tablet   No No   Sig: Place 1 tablet (0 4 mg total) under the tongue every 5 (five) minutes as needed for chest pain   rosuvastatin (CRESTOR) 40 MG tablet 7/19/2022 at Unknown time  Yes Yes   Sig: TAKE 1 TABLET BY MOUTH ONCE DAILY IN THE EVENING  torsemide (DEMADEX) 20 mg tablet   No No   Sig: Take 1 tablet (20 mg total) by mouth daily      Facility-Administered Medications: None     Allergies   Allergen Reactions    Ace Inhibitors Angioedema    Alprazolam Other (See Comments)     Other reaction(s): Other: Document details in comments  SEVERE DISOREINTATION/CRAZY  SEVERE DISOREINTATION/CRAZY      Buspirone      Other reaction(s): Other (See Comments), Other: Document details in comments  SEVERE DISORIENTATION/CRAZY  SEVERE DISORIENTATION/CRAZY      Lorazepam      Other reaction(s):  Other (See Comments), Other: Document details in comments  SEVERE DISORIENTATION/CRAZY  SEVERE DISORIENTATION/CRAZY         Physical Exam  BP (!) 177/80   Pulse (!) 51   Temp 97 9 °F (36 6 °C) (Oral)   Resp 18   Ht 6' 3" (1 905 m)   Wt 136 kg (300 lb)   SpO2 92%   BMI 37 50 kg/m²       GEN: [facility] appears well, alert and oriented x 3, pleasant and cooperative   HEENT:  Normocephalic, atraumatic, anicteric, moist mucous membranes  NECK: No JVD; no carotid bruits   HEART: Regular rhythm, normal rate, normal S1 and S2, no murmurs  LUNGS: Clear to auscultation bilaterally; no wheezes, rales, or rhonchi; respiration nonlabored   ABDOMEN:  Normoactive bowel sounds, soft, no tenderness, no distention  EXTREMITIES: No edema  NEURO: no gross focal findings; cranial nerves grossly intact   SKIN:  Dry, intact, warm to touch  ACCESS: Delayed right radial Alex's, 2+ right femoral pulse      Previous Cath/PCI:  -6/2004 [facility] (Giulia Bautista, [facility]): inferior MI with 2 75 x 33 mm cypher RO to mid 99% RCA culprit; prox 20-30% LAD, 40-50% ostial D1, 30-40% OM1,  50-60%LPL  -10/2005 [facility] ([place]ie, [facility]): prox 20-30% LAD, 50% ostial D1, 50% OM1,  50%LPL  RCA 40% ISR  EF 60% with inferolateral WMA      Previous STRESS TEST:  Results for orders placed during the hospital encounter of 06/23/22    NM myocardial perfusion spect (rx stress and/or rest)    Interpretation Summary    Stress ECG: No ST deviation is noted  Arrhythmias during stress: PACs, occasional PVCs    Resting ECG: The ECG shows sinus bradycardia  Resting ECG shows no ST-segment deviation    Stress ECG: A pharmacological stress test was performed using regadenoson  The patient experienced no angina during the test     74year old man with history of CAD s/p PCI, HTN, HLD, current smoking, CKD with shortness of breath    Perfusion Defect Conclusion: The stress/rest perfusion ratio is 1 06   There is no evidence of transient ischemic dilation (TID)    Stress Function: Left ventricular function post-stress is normal  Post-stress ejection fraction is 60 %  There is mild hypokinesis of the basal to mid inferior wall    Perfusion: There is a medium-sized, moderate intensity perfusion defect in the basal to distal inferior wall, with partial reversibility in the distal inferior wall consistent with myocardial scar with significant tk-scar ischemia  This is an abnormal pharmacological stress test   Overall LV function is normal  There is mild hypokinesis of the basal to mid inferior wall  There is a medium-sized, moderate intensity defect in the basal to distal inferior wall, with partial reversibility in the distal inferior wall  In the setting of known prior inferior wall infarct, this is suggestive of myocardial scar with significant tk-scar ischemia        ECHO:  Results for orders placed during the hospital encounter of 06/23/22    Echo complete w/ contrast if indicated    Interpretation Summary    Left Ventricle: Left ventricular cavity size is grossly normal  Wall thickness is grossly normal  The left ventricular ejection fraction is 60%  Systolic function is normal  Wall motion is normal  Diastolic function is normal     Right Ventricle: Right ventricular cavity size is normal  Systolic function is normal     Left Atrium: Left atrium is not well visualized    Right Atrium: Right atrium is not well visualized    Aortic Valve: The aortic valve is trileaflet  The leaflets are mildly thickened  The leaflets are mildly calcified  The leaflets exhibit normal mobility  There is no evidence of regurgitation  The aortic valve has no significant stenosis    Mitral Valve: The mitral valve has normal structure and function  There is trace regurgitation  There is no evidence of stenosis    Tricuspid Valve: Tricuspid valve structure is normal  There is trace regurgitation  There is no evidence of stenosis  There is no indirect evidence of pulmonary hypertension    Pulmonic Valve: The pulmonic valve was not well visualized  There is no evidence of regurgitation  There is no evidence of stenosis    Aorta: The aortic root is normal in size    Pericardium: There is no pericardial effusion    Prior TTE study available for comparison  Prior study date: 9/18/2020  No significant changes noted compared to the prior study  SAMY:  No results found for this or any previous visit  CMR:  No results found for this or any previous visit  Lab Results: I have personally reviewed pertinent lab results      Results from last 7 days   Lab Units 07/19/22  0743 07/18/22  1225   POTASSIUM mmol/L 3 9 3 6   CO2 mmol/L 29 31   CHLORIDE mmol/L 105 98   BUN mg/dL 19 20   CREATININE mg/dL 1 48* 1 81*   ALT U/L  --  31   AST U/L  --  30             Results from last 7 days   Lab Units 07/18/22  1225   WBC Thousand/uL 10 10   HEMOGLOBIN g/dL 16 1   HEMATOCRIT % 47 8   MCV fL 96   PLATELETS Thousands/uL 248             Assessment/Plan     Assessment:    1  Abnormal pharmacological nuclear stress test  -6/23 Rx nuc stress: inferior infarct with tk-infarct ischemia, gated EF 60% with reduced inferior thickening/WMA  -6/2004 [facility] ([address], [facility]): inferior MI with 2 75 x 33 mm cypher RO to mid 99% RCA culprit; prox 20-30% LAD, 40-50% ostial D1, 30-40% OM1,  50-60%LPL  -10/2005 [facility] ([place], [facility]): prox 20-30% LAD, 50% ostial D1, 50% OM1,  50%LPL  RCA 40% ISR  EF 60% with inferolateral WMA  -6/23 TTE: LVEF 60%, no definite WMA, no DD, normal RV, no significant valve disease  -LDL 69, A1c 5 9%, Cr 1 48, trop neg 2020  -aspirin, amlodipine, benazapril, ezetimibe, metoprolol tartrate 100 mg, rosuvastatin 40 mg, torsemide 20 mg, vascepa      Plan:  1  Proceed with left heart cath +/- PCI      Case discussed and reviewed with Dr Joseph Tam who agrees with my assessment and plan  Thank you for involving us in the care of your patient  Pa Mejia MD  Cardiology Fellow PGY-7      ==========================================================================================    Epic/ Allscripts/Care Everywhere records reviewed: Yes    ** Please Note: Fluency DirectDictation voice to text software may have been used in the creation of this document   **

## 2022-07-20 ENCOUNTER — TELEPHONE (OUTPATIENT)
Dept: CARDIOLOGY CLINIC | Facility: CLINIC | Age: 67
End: 2022-07-20

## 2022-07-20 VITALS
WEIGHT: 300 LBS | OXYGEN SATURATION: 94 % | HEART RATE: 64 BPM | TEMPERATURE: 98.6 F | RESPIRATION RATE: 18 BRPM | DIASTOLIC BLOOD PRESSURE: 76 MMHG | BODY MASS INDEX: 37.3 KG/M2 | SYSTOLIC BLOOD PRESSURE: 142 MMHG | HEIGHT: 75 IN

## 2022-07-20 LAB
ANION GAP SERPL CALCULATED.3IONS-SCNC: 7 MMOL/L (ref 4–13)
ATRIAL RATE: 441 BPM
ATRIAL RATE: 48 BPM
ATRIAL RATE: 61 BPM
BUN SERPL-MCNC: 13 MG/DL (ref 5–25)
CALCIUM SERPL-MCNC: 8.8 MG/DL (ref 8.3–10.1)
CHLORIDE SERPL-SCNC: 107 MMOL/L (ref 96–108)
CO2 SERPL-SCNC: 28 MMOL/L (ref 21–32)
CREAT SERPL-MCNC: 1.14 MG/DL (ref 0.6–1.3)
ERYTHROCYTE [DISTWIDTH] IN BLOOD BY AUTOMATED COUNT: 13.6 % (ref 11.6–15.1)
GFR SERPL CREATININE-BSD FRML MDRD: 67 ML/MIN/1.73SQ M
GLUCOSE P FAST SERPL-MCNC: 113 MG/DL (ref 65–99)
GLUCOSE SERPL-MCNC: 113 MG/DL (ref 65–140)
HCT VFR BLD AUTO: 40.8 % (ref 36.5–49.3)
HGB BLD-MCNC: 13.6 G/DL (ref 12–17)
MAGNESIUM SERPL-MCNC: 1.6 MG/DL (ref 1.6–2.6)
MCH RBC QN AUTO: 32.7 PG (ref 26.8–34.3)
MCHC RBC AUTO-ENTMCNC: 33.3 G/DL (ref 31.4–37.4)
MCV RBC AUTO: 98 FL (ref 82–98)
P AXIS: 14 DEGREES
P AXIS: 49 DEGREES
PLATELET # BLD AUTO: 222 THOUSANDS/UL (ref 149–390)
PMV BLD AUTO: 9.5 FL (ref 8.9–12.7)
POTASSIUM SERPL-SCNC: 3.4 MMOL/L (ref 3.5–5.3)
PR INTERVAL: 152 MS
PR INTERVAL: 156 MS
QRS AXIS: -19 DEGREES
QRS AXIS: -20 DEGREES
QRS AXIS: -27 DEGREES
QRSD INTERVAL: 86 MS
QRSD INTERVAL: 88 MS
QRSD INTERVAL: 90 MS
QT INTERVAL: 400 MS
QT INTERVAL: 476 MS
QT INTERVAL: 494 MS
QTC INTERVAL: 441 MS
QTC INTERVAL: 479 MS
QTC INTERVAL: 486 MS
RBC # BLD AUTO: 4.16 MILLION/UL (ref 3.88–5.62)
SODIUM SERPL-SCNC: 142 MMOL/L (ref 135–147)
T WAVE AXIS: 15 DEGREES
T WAVE AXIS: 47 DEGREES
T WAVE AXIS: 65 DEGREES
VENTRICULAR RATE: 48 BPM
VENTRICULAR RATE: 61 BPM
VENTRICULAR RATE: 89 BPM
WBC # BLD AUTO: 12.34 THOUSAND/UL (ref 4.31–10.16)

## 2022-07-20 PROCEDURE — 93010 ELECTROCARDIOGRAM REPORT: CPT | Performed by: INTERNAL MEDICINE

## 2022-07-20 PROCEDURE — NC001 PR NO CHARGE: Performed by: INTERNAL MEDICINE

## 2022-07-20 PROCEDURE — 93005 ELECTROCARDIOGRAM TRACING: CPT

## 2022-07-20 PROCEDURE — 80048 BASIC METABOLIC PNL TOTAL CA: CPT | Performed by: STUDENT IN AN ORGANIZED HEALTH CARE EDUCATION/TRAINING PROGRAM

## 2022-07-20 PROCEDURE — 85027 COMPLETE CBC AUTOMATED: CPT | Performed by: STUDENT IN AN ORGANIZED HEALTH CARE EDUCATION/TRAINING PROGRAM

## 2022-07-20 PROCEDURE — 83735 ASSAY OF MAGNESIUM: CPT | Performed by: STUDENT IN AN ORGANIZED HEALTH CARE EDUCATION/TRAINING PROGRAM

## 2022-07-20 RX ORDER — CLOPIDOGREL BISULFATE 75 MG/1
75 TABLET ORAL DAILY
Status: DISCONTINUED | OUTPATIENT
Start: 2022-07-20 | End: 2022-07-20 | Stop reason: HOSPADM

## 2022-07-20 RX ORDER — CLOPIDOGREL BISULFATE 75 MG/1
75 TABLET ORAL DAILY
Qty: 30 TABLET | Refills: 11 | Status: SHIPPED | OUTPATIENT
Start: 2022-07-21

## 2022-07-20 RX ADMIN — CLOPIDOGREL BISULFATE 75 MG: 75 TABLET ORAL at 11:19

## 2022-07-20 RX ADMIN — PANTOPRAZOLE SODIUM 40 MG: 40 TABLET, DELAYED RELEASE ORAL at 06:35

## 2022-07-20 RX ADMIN — FENOFIBRATE 145 MG: 145 TABLET ORAL at 08:16

## 2022-07-20 RX ADMIN — ALLOPURINOL 300 MG: 300 TABLET ORAL at 08:16

## 2022-07-20 RX ADMIN — APIXABAN 5 MG: 5 TABLET, FILM COATED ORAL at 08:16

## 2022-07-20 RX ADMIN — AMLODIPINE BESYLATE 5 MG: 5 TABLET ORAL at 08:16

## 2022-07-20 RX ADMIN — NITROGLYCERIN 80 MCG/MIN: 20 INJECTION INTRAVENOUS at 04:09

## 2022-07-20 RX ADMIN — METOPROLOL TARTRATE 100 MG: 50 TABLET, FILM COATED ORAL at 08:15

## 2022-07-20 RX ADMIN — DOXAZOSIN 1 MG: 1 TABLET ORAL at 08:16

## 2022-07-20 NOTE — DISCHARGE SUMMARY
Discharge Summary - Leigh Rodriguez 74 y o  male MRN: [id number]    Unit/Bed#: [facility] 419-01 Encounter: [id number]    Admission Date: 7/19/2022   Discharge Date: 7/20/2022    Disposition: Home    Condition at Discharge: good     PCP:Andi Yuen DO      OP Cardiologist:  Dr Shayla Ornelas  Interventional cardiologist:  Dr Solange Rodriguez      Admitting Diagnosis:  Abnormal stress test with history of CAD, stents and MI in the past      Secondary Diagnoses:   CAD  Status post inferior STEMI in 2004 with drug-eluting stent to mid RCA and residual nonobstructive LAD and circ disease  Morbid obesity BMI 10 1 kg/m2  Metabolic syndrome  Hypertension   Obstructive sleep apnea -non adherent with CPAP chronic  Chronic kidney disease stage 3 -single functioning kidney-  with atrophic kidney since birth  Discharge Diagnosis:  Multivessel CAD        /75   Pulse (!) 107   Temp 97 5 °F (36 4 °C)   Resp 18   Ht 6' 3" (1 905 m)   Wt 136 kg (300 lb)   SpO2 95%   BMI 37 50 kg/m²       Review of Systems   All other systems reviewed and are negative  Physical Exam  Constitutional:       Appearance: Normal appearance  HENT:      Head: Normocephalic and atraumatic  Mouth/Throat:      Mouth: Mucous membranes are moist    Cardiovascular:      Rate and Rhythm: Normal rate and regular rhythm  Pulses: Normal pulses  Heart sounds: Normal heart sounds  Pulmonary:      Effort: Pulmonary effort is normal       Breath sounds: Normal breath sounds  Abdominal:      General: Bowel sounds are normal  There is distension  Palpations: Abdomen is soft  Musculoskeletal:      Right lower leg: No edema  Left lower leg: Edema present  Skin:     General: Skin is warm and dry  Capillary Refill: Capillary refill takes 2 to 3 seconds  Neurological:      Mental Status: He is alert and oriented to person, place, and time     Psychiatric:         Behavior: Behavior normal      Right groin no hematoma, ecchymosis or bruit     HPI and Hospital Course:  82-year-old male with history of inferior STEMI in 2004 with drug-eluting stent to mid RCA and residual nonobstructive disease of LAD and circumflex  Patient had abnormal stress test for dyspnea on exertion  Other medical history includes morbid obesity, metabolic syndrome, hypertension, hyperlipidemia, continued light tobacco use, obstructive sleep apnea apnea and noncompliant with CPAP and CKD stage 3  Patient was electively admitted for cardiac catheterization to identify his coronary anatomy and ischemic burden  The results of the catheters follows:      Left Main   The vessel is large and is angiographically normal    Left Anterior Descending   The vessel is moderate in size  There is moderate diffuse disease throughout the vessel  Prox LAD to Mid LAD lesion is 80% stenosed  JD flow is 3  iFR was measured in the Prox LAD to Mid LAD  iFR ratio: 0 82  The iFR was significant, intervened  Left Circumflex   The vessel is large and dominant  There is moderate diffuse disease throughout the vessel  First Obtuse Marginal Branch   1st Mrg lesion is 70% stenosed  JD flow is 3  Right Coronary Artery   The vessel is small and dominant  There is moderate diffuse disease throughout the vessel  Prox RCA to Mid RCA lesion is 99% stenosed  JD flow is 3  PCI:    Patient underwent shockwave to Proximal to mid LAD, followed by PCI and RO SYNERGY XD MR US 3 [id number]  PCI and drug-eluting stent to prox to mid RCA STENT RO SYNERGY XD MR US 2 [id number] and STENT RO SYNERGY XD MR US 2 [id number]  Patient did receive 230 the mL of IV contrast   He received pre hydration to the procedure based on ESTELLE protocol  Patient received aggressive fluid resuscitation 150 mL/hour overnight  His admitting creatinine was 1 48 and his GFR was 48  His discharge creatinine today is 1 14 and his GFR is 67  I will check a  BMP on Friday      Patient was significantly hypertensive during the case and he was placed on IV nitro drip remained on it overnight  He also went into atrial fibrillation has been placed on Eliquis 5 mg b i d  He will also be on Plavix 75 mg daily but no aspirin for Dr Marissa Lyn  Spoke to Dr Pepito Lozano to obtain samples for Eliquis  His current cost would be 347 dollars a month but will be applied to his deductible  Spoke with Dr Nehemiah Lerma via phone and updated her with the patient's hospitalization  Patient has been counseled on smoking cessation  Cardiac rehab referral has been made  Patient will follow-up with and give all on 07/27/2022 8:20 AM  at the [facility] office  Patient did receive 61 4 minutes of fluoro time and 4234 mGy during his procedure  Patient was informed by Dr Marissa Lyn regarding the excess fluoro time  Patient has been instructed in regards to observing symptoms on his skin and received a schematic of the areas where it could potentially occur  Patient received a copy of this paper on discharge  Dr Tammy Tomlin, his cardiologist has been notified        Current Facility-Administered Medications   Medication Dose Route Frequency    acetaminophen (TYLENOL) tablet 650 mg  650 mg Oral Q4H PRN    albuterol (PROVENTIL HFA,VENTOLIN HFA) inhaler 2 puff  2 puff Inhalation Q6H PRN    allopurinol (ZYLOPRIM) tablet 300 mg  300 mg Oral Daily    amLODIPine (NORVASC) tablet 5 mg  5 mg Oral Daily    apixaban (ELIQUIS) tablet 5 mg  5 mg Oral BID    atorvastatin (LIPITOR) tablet 80 mg  80 mg Oral Daily With Dinner    doxazosin (CARDURA) tablet 1 mg  1 mg Oral Daily    ezetimibe (ZETIA) tablet 10 mg  10 mg Oral HS    fenofibrate (TRICOR) tablet 145 mg  145 mg Oral Daily    fluticasone (FLONASE) 50 mcg/act nasal spray 2 spray  2 spray Nasal Q12H PRN    metoprolol tartrate (LOPRESSOR) tablet 100 mg  100 mg Oral BID    nitroglycerin (NITROSTAT) SL tablet 0 4 mg  0 4 mg Sublingual Q5 Min PRN    ondansetron (ZOFRAN) injection 4 mg  4 mg Intravenous Q6H PRN    pantoprazole (PROTONIX) EC tablet 40 mg  40 mg Oral Early Morning       Pertinent Labs/diagnostics:        Lab Ressults:  Recent Results (from the past 24 hour(s))   POCT activated clotting time    Collection Time: 07/19/22 12:13 PM   Result Value Ref Range    Activated Clotting Time, i-STAT 270 (H) 89 - 137 sec    Specimen Type VENOUS    POCT activated clotting time    Collection Time: 07/19/22 12:37 PM   Result Value Ref Range    Activated Clotting Time, i-STAT 283 (H) 89 - 137 sec    Specimen Type VENOUS    POCT activated clotting time    Collection Time: 07/19/22  1:02 PM   Result Value Ref Range    Activated Clotting Time, i-STAT 289 (H) 89 - 137 sec    Specimen Type VENOUS    POCT activated clotting time    Collection Time: 07/19/22  1:26 PM   Result Value Ref Range    Activated Clotting Time, i-STAT 296 (H) 89 - 137 sec    Specimen Type VENOUS    POCT activated clotting time    Collection Time: 07/19/22  2:09 PM   Result Value Ref Range    Activated Clotting Time, i-STAT 244 (H) 89 - 137 sec    Specimen Type VENOUS    ECG 12 lead    Collection Time: 07/19/22  2:50 PM   Result Value Ref Range    Ventricular Rate 61 BPM    Atrial Rate 61 BPM    AK Interval 156 ms    QRSD Interval 86 ms    QT Interval 476 ms    QTC Interval 479 ms    P Axis 49 degrees    QRS Axis -20 degrees    T Wave Axis 47 degrees   Basic metabolic panel    Collection Time: 07/20/22  5:16 AM   Result Value Ref Range    Sodium 142 135 - 147 mmol/L    Potassium 3 4 (L) 3 5 - 5 3 mmol/L    Chloride 107 96 - 108 mmol/L    CO2 28 21 - 32 mmol/L    ANION GAP 7 4 - 13 mmol/L    BUN 13 5 - 25 mg/dL    Creatinine 1 14 0 60 - 1 30 mg/dL    Glucose 113 65 - 140 mg/dL    Glucose, Fasting 113 (H) 65 - 99 mg/dL    Calcium 8 8 8 3 - 10 1 mg/dL    eGFR 67 ml/min/1 73sq m   CBC    Collection Time: 07/20/22  5:16 AM   Result Value Ref Range    WBC 12 34 (H) 4 31 - 10 16 Thousand/uL    RBC 4 16 3 88 - 5 62 Million/uL    Hemoglobin 13 6 12 0 - 17 0 g/dL Hematocrit 40 8 36 5 - 49 3 %    MCV 98 82 - 98 fL    MCH 32 7 26 8 - 34 3 pg    MCHC 33 3 31 4 - 37 4 g/dL    RDW 13 6 11 6 - 15 1 %    Platelets 699 893 - 769 Thousands/uL    MPV 9 5 8 9 - 12 7 fL   Magnesium    Collection Time: 07/20/22  5:16 AM   Result Value Ref Range    Magnesium 1 6 1 6 - 2 6 mg/dL     Tele:  Atrial fibrillation of with ventricular rate control    Discharge instructions/Information to patient and family:   See after visit summary for information provided to patient and family  Provisions for Follow-Up Care:  See after visit summary for information related to follow-up care and any pertinent home health orders  Planned Readmission: No    Discharge Statement:  I spent 45 minutes minutes discharging the patient  This time was spent on the day of discharge  I had direct contact with the patient on the day of discharge  Additional documentation is required if more than 30 minutes were spent on discharge  ** Please Note: Fluency Direct Dictation voice to text software may have been used in the creation of this document   **

## 2022-07-22 ENCOUNTER — APPOINTMENT (OUTPATIENT)
Dept: LAB | Facility: HOSPITAL | Age: 67
End: 2022-07-22
Payer: COMMERCIAL

## 2022-07-22 ENCOUNTER — TELEPHONE (OUTPATIENT)
Dept: NON INVASIVE DIAGNOSTICS | Facility: HOSPITAL | Age: 67
End: 2022-07-22

## 2022-07-22 DIAGNOSIS — E87.6 HYPOKALEMIA: Primary | ICD-10-CM

## 2022-07-22 DIAGNOSIS — Z95.820 S/P ANGIOPLASTY WITH STENT: ICD-10-CM

## 2022-07-22 DIAGNOSIS — N18.30 STAGE 3 CHRONIC KIDNEY DISEASE, UNSPECIFIED WHETHER STAGE 3A OR 3B CKD (HCC): ICD-10-CM

## 2022-07-22 LAB
ANION GAP SERPL CALCULATED.3IONS-SCNC: 9 MMOL/L (ref 4–13)
BUN SERPL-MCNC: 18 MG/DL (ref 5–25)
CALCIUM SERPL-MCNC: 9 MG/DL (ref 8.3–10.1)
CHLORIDE SERPL-SCNC: 101 MMOL/L (ref 96–108)
CO2 SERPL-SCNC: 29 MMOL/L (ref 21–32)
CREAT SERPL-MCNC: 1.46 MG/DL (ref 0.6–1.3)
ERYTHROCYTE [DISTWIDTH] IN BLOOD BY AUTOMATED COUNT: 13.7 % (ref 11.6–15.1)
GFR SERPL CREATININE-BSD FRML MDRD: 49 ML/MIN/1.73SQ M
GLUCOSE SERPL-MCNC: 106 MG/DL (ref 65–140)
HCT VFR BLD AUTO: 46.8 % (ref 36.5–49.3)
HGB BLD-MCNC: 15.9 G/DL (ref 12–17)
MCH RBC QN AUTO: 33.1 PG (ref 26.8–34.3)
MCHC RBC AUTO-ENTMCNC: 34 G/DL (ref 31.4–37.4)
MCV RBC AUTO: 98 FL (ref 82–98)
PLATELET # BLD AUTO: 248 THOUSANDS/UL (ref 149–390)
PMV BLD AUTO: 9.4 FL (ref 8.9–12.7)
POTASSIUM SERPL-SCNC: 3.3 MMOL/L (ref 3.5–5.3)
RBC # BLD AUTO: 4.8 MILLION/UL (ref 3.88–5.62)
SODIUM SERPL-SCNC: 139 MMOL/L (ref 135–147)
WBC # BLD AUTO: 11.35 THOUSAND/UL (ref 4.31–10.16)

## 2022-07-22 PROCEDURE — 85027 COMPLETE CBC AUTOMATED: CPT

## 2022-07-22 PROCEDURE — 80048 BASIC METABOLIC PNL TOTAL CA: CPT

## 2022-07-22 PROCEDURE — 36415 COLL VENOUS BLD VENIPUNCTURE: CPT

## 2022-07-22 RX ORDER — POTASSIUM CHLORIDE 20 MEQ/1
20 TABLET, EXTENDED RELEASE ORAL DAILY
Qty: 30 TABLET | Refills: 0 | Status: SHIPPED | OUTPATIENT
Start: 2022-07-22

## 2022-07-22 NOTE — TELEPHONE ENCOUNTER
Spoke with Jarrett Wade regarding his lab results  Renal function today is stable  Potassium is low at 3 3  Will initiate supplementation of 20 mEq daily  Recheck labs in 1 week  He follows up with me on 7/27  He tells me that he feels great  He has no swelling, BP improved, and activity tolerance much improved  No concerns at this time  Will follow up Wednesday but instructed to call with any concerning symptoms and he verbalized understanding

## 2022-07-27 ENCOUNTER — LAB (OUTPATIENT)
Dept: LAB | Facility: HOSPITAL | Age: 67
End: 2022-07-27
Payer: COMMERCIAL

## 2022-07-27 ENCOUNTER — TELEPHONE (OUTPATIENT)
Dept: CARDIOLOGY CLINIC | Facility: CLINIC | Age: 67
End: 2022-07-27

## 2022-07-27 ENCOUNTER — OFFICE VISIT (OUTPATIENT)
Dept: CARDIOLOGY CLINIC | Facility: CLINIC | Age: 67
End: 2022-07-27
Payer: COMMERCIAL

## 2022-07-27 VITALS
HEIGHT: 75 IN | BODY MASS INDEX: 37.42 KG/M2 | OXYGEN SATURATION: 96 % | WEIGHT: 301 LBS | HEART RATE: 50 BPM | SYSTOLIC BLOOD PRESSURE: 118 MMHG | DIASTOLIC BLOOD PRESSURE: 74 MMHG

## 2022-07-27 DIAGNOSIS — I25.10 CORONARY ARTERY DISEASE INVOLVING NATIVE CORONARY ARTERY OF NATIVE HEART WITHOUT ANGINA PECTORIS: Primary | ICD-10-CM

## 2022-07-27 DIAGNOSIS — N18.31 STAGE 3A CHRONIC KIDNEY DISEASE (HCC): ICD-10-CM

## 2022-07-27 DIAGNOSIS — Z95.820 S/P ANGIOPLASTY WITH STENT: ICD-10-CM

## 2022-07-27 DIAGNOSIS — E78.2 MIXED HYPERLIPIDEMIA: ICD-10-CM

## 2022-07-27 DIAGNOSIS — G47.33 OBSTRUCTIVE SLEEP APNEA SYNDROME: ICD-10-CM

## 2022-07-27 DIAGNOSIS — R60.0 LOCALIZED EDEMA: ICD-10-CM

## 2022-07-27 DIAGNOSIS — I48.0 PAROXYSMAL ATRIAL FIBRILLATION (HCC): ICD-10-CM

## 2022-07-27 DIAGNOSIS — R06.09 DYSPNEA ON EXERTION: ICD-10-CM

## 2022-07-27 DIAGNOSIS — I10 PRIMARY HYPERTENSION: ICD-10-CM

## 2022-07-27 LAB
ANION GAP SERPL CALCULATED.3IONS-SCNC: 10 MMOL/L (ref 4–13)
BUN SERPL-MCNC: 21 MG/DL (ref 5–25)
CALCIUM SERPL-MCNC: 9.1 MG/DL (ref 8.3–10.1)
CHLORIDE SERPL-SCNC: 99 MMOL/L (ref 96–108)
CO2 SERPL-SCNC: 28 MMOL/L (ref 21–32)
CREAT SERPL-MCNC: 1.58 MG/DL (ref 0.6–1.3)
GFR SERPL CREATININE-BSD FRML MDRD: 45 ML/MIN/1.73SQ M
GLUCOSE P FAST SERPL-MCNC: 91 MG/DL (ref 65–99)
POTASSIUM SERPL-SCNC: 3.8 MMOL/L (ref 3.5–5.3)
SODIUM SERPL-SCNC: 137 MMOL/L (ref 135–147)

## 2022-07-27 PROCEDURE — 36415 COLL VENOUS BLD VENIPUNCTURE: CPT

## 2022-07-27 PROCEDURE — 99214 OFFICE O/P EST MOD 30 MIN: CPT | Performed by: NURSE PRACTITIONER

## 2022-07-27 PROCEDURE — 93000 ELECTROCARDIOGRAM COMPLETE: CPT | Performed by: NURSE PRACTITIONER

## 2022-07-27 PROCEDURE — 80048 BASIC METABOLIC PNL TOTAL CA: CPT

## 2022-07-27 NOTE — TELEPHONE ENCOUNTER
----- Message from Chapis Billingsley 84 sent at 7/27/2022 12:09 PM EDT -----  Please let Azalia Minder know that his potassium is better  Renal function is holding stable  Blood was somewhat hemolyzed, which can falsely make the potassium look higher  Should recheck within the next 2 weeks to monitor  Order in chart

## 2022-07-27 NOTE — ASSESSMENT & PLAN NOTE
Patient noted to go into atrial fibrillation the evening of 7/19/22 post cardiac catheterization with intervention  This is a new diagnosis, although he carries risk factors for a fib  Started on Eliquis 5 mg BID  ECG today shows SB, rate 54 bpm   14 day ZIO monitor applied to assess a fib burden  Urged faithful CPAP use

## 2022-07-27 NOTE — ASSESSMENT & PLAN NOTE
Blood pressure excellent today  Benazepril was stopped in the hospital due to angioedema  Currently on amlodipine 5 mg daily, doxazosin 1 mg daily, metoprolol tartrate 100 mg BID  Continues on torsemide 20 mg daily  Reviewed importance of CPAP compliance, 2 g sodium diet, smoking cessation, and weight control

## 2022-07-27 NOTE — ASSESSMENT & PLAN NOTE
Lab Results   Component Value Date    EGFR 49 07/22/2022    EGFR 67 07/20/2022    EGFR 48 07/19/2022    CREATININE 1 46 (H) 07/22/2022    CREATININE 1 14 07/20/2022    CREATININE 1 48 (H) 07/19/2022     Monitoring closely post cardiac cath  Received 230 ml of contrast with pre- and post hydration  Renal function stable last week  Will check BMP today  Avoiding other nephrotoxic agents

## 2022-07-27 NOTE — ASSESSMENT & PLAN NOTE
Previously reported poor CPAP compliance with mask leaking/whistling  We reviewed the correlation between untreated sleep apnea and atrial fibrillation  He has an appointment with sleep medicine to discuss his pressure settings  Urged faithful CPAP use

## 2022-07-27 NOTE — ASSESSMENT & PLAN NOTE
Coronary Artery Disease:  A  Inferior MI 6/4/2004 with VF arrest   B  PCI and stent to RCA 6/4/2004  C  Cardiac catheterization 10/2005: LM ok  LAD 20-30% proximal stenosis  Dx 50% ostial  LCx 50% OM and PLB with 50%  RCA 40% ISR  No intervention  EF 60%  D  Non ischemic Lexiscan stress test 8/12/2019  EF 54%  Paolo Warner stress test 6/23/2022 with inferior scar with large periscar ischemia  F  Cardiac cath 7/19/22: 80% LAD(p-m) with significant iFR, 70% 1st OM, 99% RCA(p-m); shockwave/PCI/RO to LAD, PCI and RO x 2 to RCA  - - - - - - - -  Echocardiogram 6/23/2022 with EF 60%  Patient notes significant improvement in his dyspnea on exertion, blood pressure, and swelling since intervention on 7/19/22  ECG today shows SB, inferior infarct, unchanged from prior ECG  Aspirin discontinued by Dr Sanchez Simms as patient started on Eliquis for PAF  Started on Plavix 75 mg daily  Continue metoprolol tartrate 100 mg BID, rosuvastatin 40 mg daily  He has quit smoking for 3 weeks and I applauded his efforts  Reviewed s/s to report  Will monitor

## 2022-07-27 NOTE — ASSESSMENT & PLAN NOTE
PCI and stent to RCA in 2004  Shockwave/PCI/RO to mid LAD, PCI and RO x 2 to mid RCA on 7/19/2022  Currently on Eliquis for PAF and Plavix 75 mg daily  Dr Daiana Kwong opted against triple therapy with aspirin  We reviewed the importance of faithful medication compliance  Will not stop these meds without discussion from our office

## 2022-07-27 NOTE — LETTER
July 27, 2022     Patient: Doreen Brito  YOB: 1955  Date of Visit: 7/27/2022      To Whom it May Concern:    Doreen Brito is under my professional care  Taye was seen in my office on 7/27/2022  [place] may return to work on 8/8/2022  If you have any questions or concerns, please don't hesitate to call           Sincerely,          Chapis Vega

## 2022-07-27 NOTE — PATIENT INSTRUCTIONS
EKG today shows sinus rhythm  We applied a 2 week monitor to assess your heart rhythm to check for recurrent a fib  Please continue with CPAP usage to protect against a fib  Blood pressure today is perfect  Notify us of chest pain, increasing shortness of breath, swelling/weight gain, or any other concerns  Continue to avoid smoking!   Lab work today to  monitor kidney function and potassium

## 2022-07-27 NOTE — PROGRESS NOTES
Cardiology Follow Up    Arik Rajan  1955  [id number]  [facility] [address]  [address] [facility] [address] [place] [address]  [phone]    Jamie Kwon presents for follow up from his cardiac catheterization with shockwave/PCI/RO to mid LAD and PCI/RO to mid RCA  1  Coronary artery disease involving native coronary artery of native heart without angina pectoris  Assessment & Plan:  Coronary Artery Disease:  A  Inferior MI 6/4/2004 with VF arrest   B  PCI and stent to RCA 6/4/2004  C  Cardiac catheterization 10/2005: LM ok  LAD 20-30% proximal stenosis  Dx 50% ostial  LCx 50% OM and PLB with 50%  RCA 40% ISR  No intervention  EF 60%  D  Non ischemic Lexiscan stress test 8/12/2019  EF 54%  Kiesha Search stress test 6/23/2022 with inferior scar with large periscar ischemia  F  Cardiac cath 7/19/22: 80% LAD(p-m) with significant iFR, 70% 1st OM, 99% RCA(p-m); shockwave/PCI/RO to LAD, PCI and RO x 2 to RCA  - - - - - - - -  Echocardiogram 6/23/2022 with EF 60%  Patient notes significant improvement in his dyspnea on exertion, blood pressure, and swelling since intervention on 7/19/22  ECG today shows SB, inferior infarct, unchanged from prior ECG  Aspirin discontinued by Dr Peter Casanova as patient started on Eliquis for PAF  Started on Plavix 75 mg daily  Continue metoprolol tartrate 100 mg BID, rosuvastatin 40 mg daily  He has quit smoking for 3 weeks and I applauded his efforts  Reviewed s/s to report  Will monitor  Orders:  -     POCT ECG    2  S/P angioplasty with stent  Assessment & Plan:  PCI and stent to RCA in 2004  Shockwave/PCI/RO to mid LAD, PCI and RO x 2 to mid RCA on 7/19/2022  Currently on Eliquis for PAF and Plavix 75 mg daily  Dr Pteer Casanova opted against triple therapy with aspirin  We reviewed the importance of faithful medication compliance  Will not stop these meds without discussion from our office        3  Paroxysmal atrial fibrillation [facility])  Assessment & Plan:  Patient noted to go into atrial fibrillation the evening of 7/19/22 post cardiac catheterization with intervention  This is a new diagnosis, although he carries risk factors for a fib  Started on Eliquis 5 mg BID  ECG today shows SB, rate 54 bpm   14 day ZIO monitor applied to assess a fib burden  Urged faithful CPAP use  Orders:  -     AMB extended holter monitor; Future; Expected date: 07/27/2022  -     POCT ECG    4  Stage 3a chronic kidney disease [facility])  Assessment & Plan:  Lab Results   Component Value Date    EGFR 49 07/22/2022    EGFR 67 07/20/2022    EGFR 48 07/19/2022    CREATININE 1 46 (H) 07/22/2022    CREATININE 1 14 07/20/2022    CREATININE 1 48 (H) 07/19/2022     Monitoring closely post cardiac cath  Received 230 ml of contrast with pre- and post hydration  Renal function stable last week  Will check BMP today  Avoiding other nephrotoxic agents  Orders:  -     Basic metabolic panel; Future    5  Primary hypertension  Assessment & Plan:  Blood pressure excellent today  Benazepril was stopped in the hospital due to angioedema  Currently on amlodipine 5 mg daily, doxazosin 1 mg daily, metoprolol tartrate 100 mg BID  Continues on torsemide 20 mg daily  Reviewed importance of CPAP compliance, 2 g sodium diet, smoking cessation, and weight control  6  Mixed hyperlipidemia  Assessment & Plan:  Patient is currently on rosuvastatin 40 mg daily, Zetia 10 mg daily  Goal LDL < 70  Lipid panel 4/1/2022: T 268  Direct LDL 69  Was on fenofibrate in the past which may need to be resumed if triglycerides remain elevated  Will continue to monitor  7  Obstructive sleep apnea syndrome  Assessment & Plan:  Previously reported poor CPAP compliance with mask leaking/whistling  We reviewed the correlation between untreated sleep apnea and atrial fibrillation  He has an appointment with sleep medicine to discuss his pressure settings    Urged faithful CPAP use       8  Dyspnea on exertion  Assessment & Plan:  Resolved since cardiac catheterization  Will monitor given residual OM disease  9  Localized edema  Assessment & Plan:  Resolved with increase in torsemide to 20 mg daily  Will monitor  Angela Zambrano has a past medical history of MI with VF arrest 2004 at which time he underwent PCI of the right coronary artery         He was admitted to [facility] 7/9/2020 with swelling of his face and tongue   This occurred after eating dinner on 07/09/2020   The patient was given Benadryl and steroids as well as epinephrine it was felt that the patient likely had developed angioedema from ACE-inhibitor   On admission he was noted to have hypokalemia with a potassium of 2 9 as well as hypomagnesemia with a magnesium of 0 4   Electrolytes were repleted   The patient felt well and was discharged home on 07/10/2020      He is a long time patient of Dr Humphrey Cornejo and re-established with her on 8/12/2020   He had undergone a nuclear stress test 08/11/2019 which showed ejection fraction of 54% and was in normal limits  He noted occasional lower extremity edema  Denied any chest pain, shortness of breath, lightheadedness or dizziness, palpitations        He followed up 4/8/2021 with Dr Lincoln Sand had broken his wrist 02/22/2021 and underwent surgical intervention 03/01/2021  Blood pressure had been intermittently labile   He denied any chest pain   Patient did present for blood pressure check 02/10/2021 as his blood pressure had been running high  His amlodipine was increased to 5 mg daily  Doxazosin 1 mg daily was added  Sierra Gardiner followed up with me most recently on 5/6/2022  He had gained 22 pounds since his visit the prior year  He had changed jobs and was much less active  He had not been compliant with his CPAP  He continued to smoke, about 1/2 PPD  He denied chest pain but reported progressively worsening dyspnea on exertion along with leg swelling  Updated echocardiogram and nuclear stress testing were ordered      Echocardiogram 6/23/22 showed preserved LVEF with no significant wall motion or valvular abnormalities  Lexiscan nuclear stress test 6/23/22 showed an inferior wall scar with significant tk-scar ischemia  Therefore he was referred to undergo cardiac catheterization with nephrology consultation prior given his renal function  Angelique Aguilar underwent cardiac catheterization [facility] on 7/19/2022 which showed 80% stenosis of the proximal to mid LAD, iFR was significant  70% stenosis to 1st marginal  99% stenosis of proximal to mid RCA  He underwent shockwave to the proximal to mid LAD lesion followed by PCI with RO  PCI and RO x 2 placed to proximal to mid RCA lesion  He received 230 mL of IV contrast/ 61 4 minutes of fluro time (excess fluoro time was reviewed with patient by Dr Jaci Monroy and he was counseled on observing for radiation burn)  He did receive pre-hydration per ESTELLE protocol and aggressive fluid resuscitation 150 mL/hr overnight  Admitting creatinine/GFR 1 48/48, discharge creatinine/GFR 1 14/67  He required an IV nitro drip overnight due to significant hypertension  He did go into rapid atrial fibrillation following the intervention  He was started on Eliquis 5 mg BID and Plavix 75 mg daily (no aspirin as per Dr Jaci Monroy)  7/27/2022: Angelique Aguilar presents today for close follow up  He is smiling  He states he feels so much better  He denies any chest discomfort  Shortness of breath/dyspnea on exertion is resolved  No swelling  BP much improved  He is taking and tolerating his medications without issue  No unusual bleeding/bruising  He denies palpitations/heart racing or lightheadedness  He states that he did not feel the a fib when experiencing in the hospital  He has been using his CPAP recently but has an appointment with sleep medicine to discuss pressure adjustment  ECG today shows sinus bradycardia   Chest examined with no evidence of radiation burn and right groin access site assessed today  Medical Problems     Problem List     Esophagitis    H/O acute myocardial infarction    Coronary artery disease involving native coronary artery    S/P angioplasty with stent    HTN (hypertension)    Hyperlipidemia    Sleep apnea    Localized edema    Chronic kidney disease (CKD), stage III (moderate) (HCC)    Dyspnea on exertion    Tobacco use    Impaired fasting glucose        Past Medical History:   Diagnosis Date    Chronic kidney disease     Coronary artery disease     CPAP (continuous positive airway pressure) dependence     Intermittant    GERD (gastroesophageal reflux disease)     H/O heart artery stent     Heart attack ([facility] )     Hyperlipidemia     Hypertension     MI (myocardial infarction) ([facility] )     Myocardial infarction ([place] [address] )     2004 x 1 stent    LUCIO (obstructive sleep apnea)     Tobacco use      Social History     Socioeconomic History    Marital status: /Civil Union     Spouse name: Not on file    Number of children: Not on file    Years of education: Not on file    Highest education level: Not on file   Occupational History    Not on file   Tobacco Use    Smoking status: Former Smoker     Packs/day: 0 25     Types: Cigarettes     Quit date: 2022     Years since quittin 0    Smokeless tobacco: Never Used    Tobacco comment:  Occ Cigs   Vaping Use    Vaping Use: Never used   Substance and Sexual Activity    Alcohol use: Not Currently    Drug use: Never    Sexual activity: Not on file     Comment: Defer   Other Topics Concern    Not on file   Social History Narrative    Not on file     Social Determinants of Health     Financial Resource Strain: Not on file   Food Insecurity: Not on file   Transportation Needs: Not on file   Physical Activity: Not on file   Stress: Not on file   Social Connections: Not on file   Intimate Partner Violence: Not on file   Housing Stability: Not on file      Family History   Problem Relation Age of Onset    Hypertension Mother    Becki Wilhelm Arthritis Mother     Hypertension Father     Hyperlipidemia Father     Hypertension Sister     Hyperlipidemia Sister     Hyperlipidemia Brother     Diabetes Paternal Grandmother      Past Surgical History:   Procedure Laterality Date    BACK SURGERY  2012    CARDIAC CATHETERIZATION  06/04/2004    PTCA and Cypher stent placement to the RCA(p)   CARDIAC CATHETERIZATION  09/16/2005    LAD(p) 20-30%  Dx 50% ostial  LCx 50%  OM and PLB 50%  RCA 40% ISR  No intervention  EF 60%   CARDIAC CATHETERIZATION N/A 7/19/2022    Procedure: Cardiac Coronary Angiogram;  Surgeon: Idalia Reyes MD;  Location: [facility]; Service: Cardiology    CARDIAC CATHETERIZATION  7/19/2022    Procedure: Cardiac catheterization;  Surgeon: Idalia Reyes MD;  [facility]: [facility]; Service: Cardiology    CARDIAC CATHETERIZATION N/A 7/19/2022    Procedure: Cardiac pci;  Surgeon: Idalia Reyes MD;  Location: [facility]; Service: Cardiology    CARDIAC SURGERY      HERNIA REPAIR      AR OPEN RX DISTAL RADIUS FX, INTRA-ARTICULAR, 2 FRAG Right 3/1/2021    Procedure: OPEN REDUCTION W/ INTERNAL FIXATION (ORIF) DISTAL RADIUS;  Surgeon: Jo-Ann Truong MD;  Location: [facility] MAIN OR;  Service: Orthopedics    ROTATOR CUFF REPAIR         Current Outpatient Medications:     albuterol (PROVENTIL HFA,VENTOLIN HFA) 90 mcg/act inhaler, , Disp: , Rfl:     allopurinol (ZYLOPRIM) 300 mg tablet, TAKE (1) TABLET BY MOUTH ONCE DAILY  , Disp: , Rfl:     amLODIPine (NORVASC) 5 mg tablet, Take 1 tablet (5 mg total) by mouth daily, Disp: 30 tablet, Rfl: 11    apixaban (ELIQUIS) 5 mg, Take 1 tablet (5 mg total) by mouth 2 (two) times a day, Disp: 60 tablet, Rfl: 3    clopidogrel (PLAVIX) 75 mg tablet, Take 1 tablet (75 mg total) by mouth daily, Disp: 30 tablet, Rfl: 11    co-enzyme Q-10 100 mg capsule, Take 100 mg by mouth daily , Disp: , Rfl:     doxazosin (CARDURA) 1 mg tablet, Take 1 tablet (1 mg total) by mouth daily, Disp: 30 tablet, Rfl: 11    ezetimibe (ZETIA) 10 mg tablet, Take 10 mg by mouth daily at bedtime , Disp: , Rfl:     fenofibrate 160 MG tablet, Take by mouth, Disp: , Rfl:     fluticasone (FLONASE) 50 mcg/act nasal spray, 2 sprays into each nostril every 12 (twelve) hours as needed (sinus infections) , Disp: , Rfl:     lansoprazole (PREVACID) 30 mg capsule, TAKE 1 CAPSULE BY MOUTH ONCE DAILY  , Disp: , Rfl:     metoprolol tartrate (LOPRESSOR) 100 mg tablet, TAKE 1 TABLET BY MOUTH TWICE DAILY  , Disp: , Rfl:     nitroglycerin (NITROSTAT) 0 4 mg SL tablet, Place 1 tablet (0 4 mg total) under the tongue every 5 (five) minutes as needed for chest pain, Disp: 20 tablet, Rfl: 0    potassium chloride (K-DUR,KLOR-CON) 20 mEq tablet, Take 1 tablet (20 mEq total) by mouth daily, Disp: 30 tablet, Rfl: 0    rosuvastatin (CRESTOR) 40 MG tablet, TAKE 1 TABLET BY MOUTH ONCE DAILY IN THE EVENING , Disp: , Rfl:     torsemide (DEMADEX) 20 mg tablet, Take 1 tablet (20 mg total) by mouth daily, Disp: 30 tablet, Rfl: 11    Vascepa 1 g CAPS, , Disp: , Rfl:     nicotine polacrilex (COMMIT) 2 MG lozenge, Apply 1 lozenge (2 mg total) to the mouth or throat as needed for smoking cessation (Patient not taking: Reported on 7/27/2022), Disp: 100 each, Rfl: 0  Allergies   Allergen Reactions    Ace Inhibitors Angioedema    Alprazolam Other (See Comments)     Other reaction(s): Other: Document details in comments  SEVERE DISOREINTATION/CRAZY  SEVERE DISOREINTATION/CRAZY      Buspirone      Other reaction(s): Other (See Comments), Other: Document details in comments  SEVERE DISORIENTATION/CRAZY  SEVERE DISORIENTATION/CRAZY      Lorazepam      Other reaction(s):  Other (See Comments), Other: Document details in comments  SEVERE DISORIENTATION/CRAZY  SEVERE DISORIENTATION/CRAZY         Labs:     Chemistry        Component Value Date/Time    K 3 3 (L) 07/22/2022 0859     07/22/2022 0859 CO2 29 07/22/2022 0859    BUN 18 07/22/2022 0859    CREATININE 1 46 (H) 07/22/2022 0859        Component Value Date/Time    CALCIUM 9 0 07/22/2022 0859    ALKPHOS 138 (H) 07/18/2022 1225    AST 30 07/18/2022 1225    ALT 31 07/18/2022 1225        Imaging:   ECG 7/27/2022: Sinus bradycardia, inferior infarct, age undetermined  Rate 54 bpm     Cardiac catheterization 7/19/2022:  Prox LAD to Mid LAD lesion is 80% stenosed  · 1st Mrg lesion is 70% stenosed  · Prox RCA to Mid RCA lesion is 99% stenosed  Multivessel CAD  A long 80% stenosis of the proximal and mid LAD was interrogated by iFR and was flow-limiting (iFR=0 82)  Successful IVUS-guided PCI of proximal and mid LAD performed  After shockwave lithotripsy and pre-dilation, a 3 0x38 RO was placed under GuiderLiner support  There was no residual stenosis  IVUS-guided PCI of in-stent restenosis of the mid RCA was then performed  A 2 5x38mm RO was placed under GuideLiner support  Because of a possible edge dissection, a 2 5x12mm RO was overlapped at the distal margin of the first stent  There was no residual stenosis  Disease of OM1 was not treated but could be addressed if symptoms recur  Lexiscan nuclear stress test 6/23/2022:   Post-stress ejection fraction is 60 %  There is mild hypokinesis of the basal to mid inferior wall  Perfusion: There is a medium-sized, moderate intensity perfusion defect in the basal to distal inferior wall, with partial reversibility in the distal inferior wall consistent with myocardial scar with significant tk-scar ischemia  Echocardiogram 6/23/2022:  EF 60%  Trace MR  Trace TR      ECG 5/6/2022: Sinus bradycardia with PAC's  Inferior infarct  Rate 51 bpm      Lipid 4/1/2022: Triglycerides 268  Direct LDL 69      Lipid Panel 2/9/2021: C 163  T 215  H 38  L 82      Echocardiogram 9/18/2020:   EF 55-60%  Upper normal wall thickness  Mild diastolic dysfunction  Mildly dilated left atrium    Trace mitral regurgitation  Trace to mild tricuspid regurgitation  No significant change compared to prior study 05/11/2015      Nuclear Lexiscan Stress Test 8/12/2019:   Normal study  EF 54%      Echocardiogram(5/11/2015):  EF 55-60%  Mild MR  Mild TR      Holter Monitor - (7/6/2004) 27 VE beats  1 idioventricular episode 2 4 seconds  EKG - (4/15/2016) NSR 70 BPM  Inferior MI  Nuclear Stress Test - (5/11/2015) [place]  No scar  No ischemia  EF 57%      Cardiac Procedures:     Cardiac Catheterization - (9/16/2005) LM ok  LAD 20-30% proximal stenosis  Dx 50% ostial  LCx 50% OM and PLB with 50%  RCA 40% ISR  No intervention  EF 60%  Cardiac Catheterization - (6/4/2004) PTCA and Cypher stent placement to the Proximal RCA  Percutaneous Coronary Intervention - (6/4/2004) Cypher stent to RCA  Review of Systems   Constitutional: Negative  HENT: Negative  Cardiovascular: Negative for chest pain, dyspnea on exertion, irregular heartbeat, leg swelling, near-syncope, orthopnea and palpitations  Respiratory: Negative for cough and snoring  Endocrine: Negative  Skin: Negative  Musculoskeletal:        Right groin soreness   Gastrointestinal: Negative  Genitourinary: Negative  Neurological: Negative  Psychiatric/Behavioral: Negative  Vitals:    07/27/22 0844   BP: 118/74   Pulse: (!) 50   SpO2:      Vitals:    07/27/22 0803   Weight: (!) 137 kg (301 lb)     Height: 6' 3" (190 5 cm)   Body mass index is 37 62 kg/m²  Physical Exam  Vitals and nursing note reviewed  Constitutional:       General: He is not in acute distress  Appearance: He is well-developed  He is obese  He is not diaphoretic  HENT:      Head: Normocephalic and atraumatic  Neck:      Vascular: No carotid bruit or JVD  Cardiovascular:      Rate and Rhythm: Regular rhythm  Bradycardia present  No extrasystoles are present  Pulses: Intact distal pulses        Heart sounds: Normal heart sounds, S1 normal and S2 normal  No murmur heard  No friction rub  No gallop  Comments: No lower extremity edema  Pulmonary:      Effort: Pulmonary effort is normal  No respiratory distress  Breath sounds: Normal breath sounds  Abdominal:      General: There is no distension  Palpations: Abdomen is soft  Tenderness: There is no abdominal tenderness  Skin:     General: Skin is warm and dry  Findings: No rash  Neurological:      Mental Status: He is alert and oriented to person, place, and time     Psychiatric:         Behavior: Behavior normal

## 2022-08-05 ENCOUNTER — APPOINTMENT (OUTPATIENT)
Dept: LAB | Facility: HOSPITAL | Age: 67
End: 2022-08-05
Payer: COMMERCIAL

## 2022-08-05 ENCOUNTER — TELEPHONE (OUTPATIENT)
Dept: CARDIOLOGY CLINIC | Facility: CLINIC | Age: 67
End: 2022-08-05

## 2022-08-05 DIAGNOSIS — N18.31 STAGE 3A CHRONIC KIDNEY DISEASE (HCC): ICD-10-CM

## 2022-08-05 LAB
ANION GAP SERPL CALCULATED.3IONS-SCNC: 8 MMOL/L (ref 4–13)
BUN SERPL-MCNC: 15 MG/DL (ref 5–25)
CALCIUM SERPL-MCNC: 9 MG/DL (ref 8.3–10.1)
CHLORIDE SERPL-SCNC: 102 MMOL/L (ref 96–108)
CO2 SERPL-SCNC: 30 MMOL/L (ref 21–32)
CREAT SERPL-MCNC: 1.48 MG/DL (ref 0.6–1.3)
GFR SERPL CREATININE-BSD FRML MDRD: 48 ML/MIN/1.73SQ M
GLUCOSE P FAST SERPL-MCNC: 107 MG/DL (ref 65–99)
POTASSIUM SERPL-SCNC: 3.6 MMOL/L (ref 3.5–5.3)
SODIUM SERPL-SCNC: 140 MMOL/L (ref 135–147)

## 2022-08-05 PROCEDURE — 80048 BASIC METABOLIC PNL TOTAL CA: CPT

## 2022-08-05 PROCEDURE — 36415 COLL VENOUS BLD VENIPUNCTURE: CPT

## 2022-08-05 NOTE — TELEPHONE ENCOUNTER
----- Message from Chapis Vega sent at 8/5/2022 10:18 AM EDT -----  Please let patient know his kidney function is holding stable  Thank you!

## 2022-08-22 ENCOUNTER — CLINICAL SUPPORT (OUTPATIENT)
Dept: CARDIOLOGY CLINIC | Facility: CLINIC | Age: 67
End: 2022-08-22
Payer: COMMERCIAL

## 2022-08-22 DIAGNOSIS — I48.0 PAROXYSMAL ATRIAL FIBRILLATION (HCC): ICD-10-CM

## 2022-08-22 PROCEDURE — 93248 EXT ECG>7D<15D REV&INTERPJ: CPT | Performed by: NURSE PRACTITIONER

## 2022-11-14 ENCOUNTER — OFFICE VISIT (OUTPATIENT)
Dept: CARDIOLOGY CLINIC | Facility: CLINIC | Age: 67
End: 2022-11-14
Payer: COMMERCIAL

## 2022-11-14 VITALS
OXYGEN SATURATION: 92 % | HEIGHT: 75 IN | BODY MASS INDEX: 39.17 KG/M2 | DIASTOLIC BLOOD PRESSURE: 80 MMHG | SYSTOLIC BLOOD PRESSURE: 158 MMHG | WEIGHT: 315 LBS | HEART RATE: 61 BPM

## 2022-11-14 DIAGNOSIS — G47.33 OBSTRUCTIVE SLEEP APNEA SYNDROME: ICD-10-CM

## 2022-11-14 DIAGNOSIS — Z95.820 S/P ANGIOPLASTY WITH STENT: ICD-10-CM

## 2022-11-14 DIAGNOSIS — E78.2 MIXED HYPERLIPIDEMIA: ICD-10-CM

## 2022-11-14 DIAGNOSIS — R60.0 LOCALIZED EDEMA: ICD-10-CM

## 2022-11-14 DIAGNOSIS — I10 PRIMARY HYPERTENSION: ICD-10-CM

## 2022-11-14 DIAGNOSIS — I25.10 CORONARY ARTERY DISEASE INVOLVING NATIVE CORONARY ARTERY OF NATIVE HEART WITHOUT ANGINA PECTORIS: Primary | ICD-10-CM

## 2022-11-14 PROCEDURE — 99214 OFFICE O/P EST MOD 30 MIN: CPT | Performed by: INTERNAL MEDICINE

## 2022-11-14 NOTE — PATIENT INSTRUCTIONS
Continue current medications without change. Call me with any change in symptoms. Up to date with testing. Blood pressure is elevated on exam.   Monitor at home intermittently and call the office on Friday.

## 2022-11-14 NOTE — PROGRESS NOTES
Cardiology Office Visit    Sammie Black  [id number]  1955    [facility]  [address] [place] [address] [phone]      Dear Preston Forte DO,    I had the pleasure of seeing your patient at our [facility] [place] office today 11/14/2022. As you know he is a pleasant 74y.o. year old male with a medical history as described below. Reason for office visit: Follow up coronary artery disease, hypertension and hyperlipidemia. 1. Coronary artery disease involving native coronary artery of native heart without angina pectoris    2. S/P angioplasty with stent    3. Primary hypertension    4. Mixed hyperlipidemia    5. Obstructive sleep apnea syndrome    6. Localized edema       Discussion/Plan:     Salvador Calixto returns to the office today for follow up. He feels well since undergoing stenting x 3 7/2022. Coronary artery disease as outlined in previous [facility] notes. Most recent intervention 7/2022 with 1 RO to the proximal to mid LAD and 2 RO to the proximal to mid RCA. No chest pain. Doing well with medications. No aspirin as he is on clopidogrel and Eliquis. Continue metoprolol tartrate at current dose. Rosuvastatin 40 mg daily for goal LDL < 70. Needs to quit smoking. Blood pressure is elevated today. Recommend monitoring closely at home. I have asked him to call me with updated readings later this week. Will adjust medications as needed for optimal control. Lipid panel 4/2022 showed direct LDL 69. Continue current medications without change. LUCIO by history. Encouraged consistent CPAP use. Will plan routine follow up in the office unless symptoms warrant earlier assessment. HPI     Salvador Calixto has a history of MI with VF arrest 2004 at which time he underwent PCI of the right coronary artery. Patient was admitted to [facility] 7/9/2020 with swelling of his face and tongue.   This occurred after eating dinner on 07/09/2020. The patient was given Benadryl and steroids as well as epinephrine it was felt that the patient likely had developed angioedema from ACE-inhibitor. On admission he was noted to have hypokalemia with a potassium of 2.9 as well as hypomagnesemia with a magnesium of 0.4. Electrolytes were repleted. The patient felt well and was discharged home on 07/10/2020.      8/12/2020: [facility] for coronary artery disease, hypertension and hyperlipidemia. Patient states that since his hospitalization he has been feeling significantly better. He reported prior to admission that he had significantly decreased energy and was not eating well. He did undergo a nuclear stress test 08/11/2019 which showed ejection fraction of 54% and was in normal limits. Patient has noted occasional lower extremity edema. He denies any chest pain. He denies any shortness of breath. He denies any lightheadedness or dizziness. He denies any palpitations. 4/8/2021: Patient presents to the office today for follow-up. He has been doing well in general the exception of breaking his wrist 02/22/2021. He underwent surgical intervention 03/01/2021. He still has a brace in place. Blood pressure has been intermittently labile. He denies any chest pain. Patient did present for blood pressure check 02/10/2021 as his blood pressure had been running high. His amlodipine was increased to 5 mg daily. * Patient underwent cardiac catheterization 7/19/2022. *Seen by [address] [place] [address] 7/27/2022.     11/14/2022: Patient presents to the office today for follow up. He is doing well overall. He denies any chest pain. No palpitations. He retired since I last saw him. Breathing is significantly improved since stenting. He has not been sleeping well. His daughter was diagnosed with breast cancer.      Patient Active Problem List   Diagnosis    Chronic kidney disease (CKD), stage III (moderate) ([address])    HTN (hypertension)    Hyperlipidemia    Esophagitis    Coronary artery disease involving native coronary artery    H/O acute myocardial infarction    Sleep apnea    S/P angioplasty with stent    Localized edema    Dyspnea on exertion    Tobacco use    Impaired fasting glucose    Paroxysmal atrial fibrillation (HCC)     Past Medical History:   Diagnosis Date    Chronic kidney disease     Coronary artery disease     CPAP (continuous positive airway pressure) dependence     Intermittant    GERD (gastroesophageal reflux disease)     H/O heart artery stent     Heart attack ([address])     Hyperlipidemia     Hypertension     MI (myocardial infarction) ([address])     Myocardial infarction ([address])     2004 x 1 stent    LUCIO (obstructive sleep apnea)     Tobacco use      Social History     Socioeconomic History    Marital status: /Civil Union     Spouse name: Not on file    Number of children: Not on file    Years of education: Not on file    Highest education level: Not on file   Occupational History    Not on file   Tobacco Use    Smoking status: Former Smoker     Packs/day: 0.25     Types: Cigarettes     Quit date: 2022     Years since quittin.3    Smokeless tobacco: Never Used    Tobacco comment:  Occ Cigs   Vaping Use    Vaping Use: Never used   Substance and Sexual Activity    Alcohol use: Not Currently    Drug use: Never    Sexual activity: Not on file     Comment: Defer   Other Topics Concern    Not on file   Social History Narrative    Not on file     Social Determinants of Health     Financial Resource Strain: Not on file   Food Insecurity: Not on file   Transportation Needs: Not on file   Physical Activity: Not on file   Stress: Not on file   Social Connections: Not on file   Intimate Partner Violence: Not on file   Housing Stability: Not on file      Family History   Problem Relation Age of Onset    Hypertension Mother     Arthritis Mother     Hypertension Father     Hyperlipidemia Father Hypertension Sister     Hyperlipidemia Sister     Hyperlipidemia Brother     Diabetes Paternal Grandmother      Past Surgical History:   Procedure Laterality Date    BACK SURGERY  2012    CARDIAC CATHETERIZATION  06/04/2004    PTCA and Cypher stent placement to the RCA(p). CARDIAC CATHETERIZATION  09/16/2005    LAD(p) 20-30%. Dx 50% ostial. LCx 50%. OM and PLB 50%. RCA 40% ISR. No intervention. EF 60%. CARDIAC CATHETERIZATION N/A 7/19/2022    Procedure: Cardiac Coronary Angiogram;  Surgeon: Josiane Phelps MD;  Location: [facility]; Service: Cardiology    CARDIAC CATHETERIZATION  7/19/2022    Procedure: Cardiac catheterization;  Surgeon: Josiane Phelps MD;  Location: [facility]; Service: Cardiology    CARDIAC CATHETERIZATION N/A 7/19/2022    Procedure: Cardiac pci;  Surgeon: Josiane Phelps MD;  Location: [facility]; Service: Cardiology    CARDIAC SURGERY      HERNIA REPAIR      AR OPEN RX DISTAL RADIUS FX, INTRA-ARTICULAR, 2 FRAG Right 3/1/2021    Procedure: OPEN REDUCTION W/ INTERNAL FIXATION (ORIF) DISTAL RADIUS;  Surgeon: Krystina Moreno MD;  Location: [address] [place] [address];  Service: Orthopedics    ROTATOR CUFF REPAIR         Current Outpatient Medications:     albuterol (PROVENTIL HFA,VENTOLIN HFA) 90 mcg/act inhaler, , Disp: , Rfl:     allopurinol (ZYLOPRIM) 300 mg tablet, TAKE (1) TABLET BY MOUTH ONCE DAILY. , Disp: , Rfl:     amLODIPine (NORVASC) 5 mg tablet, Take 1 tablet (5 mg total) by mouth daily, Disp: 30 tablet, Rfl: 11    apixaban (ELIQUIS) 5 mg, Take 1 tablet (5 mg total) by mouth 2 (two) times a day, Disp: 60 tablet, Rfl: 3    clopidogrel (PLAVIX) 75 mg tablet, Take 1 tablet (75 mg total) by mouth daily, Disp: 30 tablet, Rfl: 11    co-enzyme Q-10 100 mg capsule, Take 100 mg by mouth daily , Disp: , Rfl:     doxazosin (CARDURA) 1 mg tablet, Take 1 tablet (1 mg total) by mouth daily, Disp: 30 tablet, Rfl: 11    ezetimibe (ZETIA) 10 mg tablet, Take 10 mg by mouth daily at bedtime , Disp: , Rfl:     fenofibrate 160 MG tablet, Take by mouth, Disp: , Rfl:     fluticasone (FLONASE) 50 mcg/act nasal spray, 2 sprays into each nostril every 12 (twelve) hours as needed (sinus infections) , Disp: , Rfl:     lansoprazole (PREVACID) 30 mg capsule, TAKE 1 CAPSULE BY MOUTH ONCE DAILY. , Disp: , Rfl:     metoprolol tartrate (LOPRESSOR) 100 mg tablet, TAKE 1 TABLET BY MOUTH TWICE DAILY. , Disp: , Rfl:     nitroglycerin (NITROSTAT) 0.4 mg SL tablet, Place 1 tablet (0.4 mg total) under the tongue every 5 (five) minutes as needed for chest pain, Disp: 20 tablet, Rfl: 0    rosuvastatin (CRESTOR) 40 MG tablet, TAKE 1 TABLET BY MOUTH ONCE DAILY IN THE EVENING., Disp: , Rfl:     torsemide (DEMADEX) 20 mg tablet, Take 1 tablet (20 mg total) by mouth daily, Disp: 30 tablet, Rfl: 11    Vascepa 1 g CAPS, , Disp: , Rfl:     nicotine polacrilex (COMMIT) 2 MG lozenge, Apply 1 lozenge (2 mg total) to the mouth or throat as needed for smoking cessation (Patient not taking: No sig reported), Disp: 100 each, Rfl: 0    potassium chloride (K-DUR,KLOR-CON) 20 mEq tablet, Take 1 tablet (20 mEq total) by mouth daily (Patient not taking: Reported on 11/14/2022), Disp: 30 tablet, Rfl: 0       Allergies   Allergen Reactions    Ace Inhibitors Angioedema    Alprazolam Other (See Comments)     Other reaction(s): Other: Document details in comments  SEVERE DISOREINTATION/CRAZY  SEVERE DISOREINTATION/CRAZY      Buspirone      Other reaction(s): Other (See Comments), Other: Document details in comments  SEVERE DISORIENTATION/CRAZY  SEVERE DISORIENTATION/CRAZY      Lorazepam      Other reaction(s): Other (See Comments), Other: Document details in comments  SEVERE DISORIENTATION/CRAZY  SEVERE DISORIENTATION/CRAZY         Cardiac Test Results:     Cardiac catheterization 7/19/2022:     Prox LAD to Mid LAD lesion is 80% stenosed. 1st Mrg lesion is 70% stenosed. Prox RCA to Mid RCA lesion is 99% stenosed. RO to proximal to mid LAD.  RO x 2 placed to proximal to mid RCA lesion. OM1 was not treated but recommended that if symptoms occur this could be addressed. LDL direct 4/1/2022: 69. Lipid Panel 2/9/2021: C 163. T 215. H 38. L 82. Echocardiogram 9/18/2020:   EF 55-60%. Upper normal wall thickness. Mild diastolic dysfunction. Mildly dilated left atrium. Trace mitral regurgitation. Trace to mild tricuspid regurgitation. No significant change compared to prior study 05/11/2015. Nuclear Lexiscan Stress Test 8/12/2019:   Normal study. EF 54%. Echocardiogram(5/11/2015):  EF 55-60%. Mild MR. Mild TR. Holter Monitor - (7/6/2004) 27 VE beats. 1 idioventricular episode 2.4 seconds. EKG - (4/15/2016) NSR.70 BPM. Inferior MI. Nuclear Stress Test - (5/11/2015) Nathguy Dowling. No scar. No ischemia. EF 57%. Cardiac Procedures:    Cardiac Catheterization - (9/16/2005) LM ok. LAD 20-30% proximal stenosis. Dx 50% ostial. LCx 50% OM and PLB with 50%. RCA 40% ISR. No intervention. EF 60%. Cardiac Catheterization - (6/4/2004) PTCA and Cypher stent placement to the Proximal RCA. Percutaneous Coronary Intervention - (6/4/2004) Cypher stent to RCA. Review of Systems:    Review of Systems   Constitutional:  Negative for activity change, appetite change and fatigue. HENT:  Negative for congestion, hearing loss, tinnitus and trouble swallowing. Eyes:  Negative for visual disturbance. Respiratory:  Negative for cough, chest tightness, shortness of breath and wheezing. Cardiovascular:  Positive for leg swelling. Negative for chest pain and palpitations. Gastrointestinal:  Negative for abdominal distention, abdominal pain, nausea and vomiting. Genitourinary:  Negative for difficulty urinating. Musculoskeletal:  Negative for arthralgias. Skin:  Negative for rash. Neurological:  Negative for dizziness, syncope and light-headedness. Hematological:  Does not bruise/bleed easily.    Psychiatric/Behavioral:  Positive for sleep disturbance. Negative for confusion. The patient is not nervous/anxious. All other systems reviewed and are negative. Vitals:    11/14/22 1532 11/14/22 1616   BP: 162/80 158/80   Pulse: 61    SpO2: 92%    Weight: (!) 144 kg (318 lb)    Height: 6' 3" (1.905 m)      Vitals:    11/14/22 1532   Weight: (!) 144 kg (318 lb)     Height: 6' 3" (190.5 cm)     Physical Exam  Vitals reviewed. Constitutional:       Appearance: He is well-developed. HENT:      Head: Normocephalic and atraumatic. Eyes:      Conjunctiva/sclera: Conjunctivae normal.      Pupils: Pupils are equal, round, and reactive to light. Neck:      Vascular: No JVD. Cardiovascular:      Rate and Rhythm: Normal rate and regular rhythm. Heart sounds: Normal heart sounds. No murmur heard. No friction rub. No gallop. Pulmonary:      Effort: Pulmonary effort is normal.      Breath sounds: Normal breath sounds. Abdominal:      General: Bowel sounds are normal.      Palpations: Abdomen is soft. Musculoskeletal:      Cervical back: Normal range of motion. Skin:     General: Skin is warm and dry. Neurological:      Mental Status: He is alert and oriented to person, place, and time.    Psychiatric:         Behavior: Behavior normal.

## 2022-11-16 DIAGNOSIS — I48.91 ATRIAL FIBRILLATION (HCC): ICD-10-CM

## 2022-11-16 NOTE — TELEPHONE ENCOUNTER
Pt's wife ISABELL stating that Ria Larsen is out of his Eliquis and needs a refill called into his [facility]    Please call to confirm when done [phone]

## 2023-05-17 DIAGNOSIS — R60.0 LOCALIZED EDEMA: ICD-10-CM

## 2023-05-17 DIAGNOSIS — I10 PRIMARY HYPERTENSION: ICD-10-CM

## 2023-05-17 RX ORDER — DOXAZOSIN MESYLATE 1 MG/1
1 TABLET ORAL DAILY
Qty: 30 TABLET | Refills: 0 | Status: SHIPPED | OUTPATIENT
Start: 2023-05-17

## 2023-05-17 RX ORDER — AMLODIPINE BESYLATE 5 MG/1
5 TABLET ORAL DAILY
Qty: 30 TABLET | Refills: 0 | Status: SHIPPED | OUTPATIENT
Start: 2023-05-17

## 2023-05-17 RX ORDER — TORSEMIDE 20 MG/1
20 TABLET ORAL DAILY
Qty: 30 TABLET | Refills: 0 | Status: SHIPPED | OUTPATIENT
Start: 2023-05-17

## 2023-06-29 ENCOUNTER — OFFICE VISIT (OUTPATIENT)
Dept: CARDIOLOGY CLINIC | Facility: CLINIC | Age: 68
End: 2023-06-29

## 2023-06-29 VITALS
HEIGHT: 75 IN | TEMPERATURE: 97.7 F | BODY MASS INDEX: 39.17 KG/M2 | WEIGHT: 315 LBS | DIASTOLIC BLOOD PRESSURE: 80 MMHG | HEART RATE: 63 BPM | SYSTOLIC BLOOD PRESSURE: 150 MMHG | OXYGEN SATURATION: 95 %

## 2023-06-29 DIAGNOSIS — R73.01 IMPAIRED FASTING GLUCOSE: ICD-10-CM

## 2023-06-29 DIAGNOSIS — I25.10 CORONARY ARTERY DISEASE INVOLVING NATIVE CORONARY ARTERY OF NATIVE HEART WITHOUT ANGINA PECTORIS: Primary | ICD-10-CM

## 2023-06-29 DIAGNOSIS — I10 PRIMARY HYPERTENSION: ICD-10-CM

## 2023-06-29 DIAGNOSIS — Z72.0 TOBACCO USE: ICD-10-CM

## 2023-06-29 DIAGNOSIS — N18.31 STAGE 3A CHRONIC KIDNEY DISEASE (HCC): ICD-10-CM

## 2023-06-29 DIAGNOSIS — I48.0 PAROXYSMAL ATRIAL FIBRILLATION (HCC): ICD-10-CM

## 2023-06-29 DIAGNOSIS — E78.2 MIXED HYPERLIPIDEMIA: ICD-10-CM

## 2023-06-29 DIAGNOSIS — R60.0 LOCALIZED EDEMA: ICD-10-CM

## 2023-06-29 DIAGNOSIS — G47.33 OBSTRUCTIVE SLEEP APNEA SYNDROME: ICD-10-CM

## 2023-06-29 RX ORDER — TORSEMIDE 20 MG/1
40 TABLET ORAL DAILY
Qty: 60 TABLET | Refills: 11 | Status: SHIPPED | OUTPATIENT
Start: 2023-06-29

## 2023-06-29 NOTE — PATIENT INSTRUCTIONS
You are retaining fluid. Increase torsemide to 40 mg daily (2 pills taken together). Lab work in 2 weeks. Please limit sodium in your diet to 2000 mg daily (food and beverages). Monitor your daily weights and call in 2 weeks after you complete the blood work. Monitor your blood pressure and notify me if staying > 140. You will remain on the Eliquis and Plavix long term. I strongly urge smoking cessation. If your dentist needs to hold meds - contact us for instructions. I referred you to sleep medicine - let me know if issues scheduling. We will consider repeat echocardiogram and/or PFT's based on how you respond.

## 2023-06-29 NOTE — PROGRESS NOTES
Cardiology Follow Up    Yancy Locus  1955  [id number]  [facility] [address]  2ND FLOOR  [address] [facility] [place]  [phone]    Fabiola Prieto presents for routine follow up of CAD, PAF, HTN, HLD. 1. Coronary artery disease involving native coronary artery of native heart without angina pectoris  Assessment & Plan:  Coronary Artery Disease:  A. Inferior MI 6/4/2004 with VF arrest.  B. PCI and stent to RCA 6/4/2004. C. Cardiac catheterization 10/2005: LM ok. LAD 20-30% proximal stenosis. Dx 50% ostial. LCx 50% OM and PLB with 50%. RCA 40% ISR. No intervention. EF 60%. D. Non ischemic Lexiscan stress test 8/12/2019. EF 54%. Rhenda Pages stress test 6/23/2022 with inferior scar with large periscar ischemia. F. Cardiac cath 7/19/22: 80% LAD(p-m) with significant iFR, 70% 1st OM, 99% RCA(p-m); shockwave/PCI/RO to LAD, PCI and RO x 2 to RCA. - - - - - - - -  Echocardiogram 6/23/2022 with EF 60%. No anginal complaints. Continue Plavix 75 mg daily with Eliquis 5 mg BID. Continue metoprolol tartrate 100 mg BID, rosuvastatin 40 mg daily. Strongly urged smoking cessation. Reviewed s/s to report. Will monitor. 2. Paroxysmal atrial fibrillation [facility])  Assessment & Plan:  Patient noted to go into atrial fibrillation the evening of 7/19/22 post cardiac catheterization with intervention. This is a new diagnosis, although he carries risk factors for a fib. Started on Eliquis 5 mg BID. ECG today shows SB, rate 58 bpm.  14 day ZIO monitor 7/27-8/10/22 showed SVT with no recurrent a fib, avg HR 53 bpm. Charley noted during sleep. Urged faithful CPAP use. Orders:  -     POCT ECG    3. Primary hypertension  Assessment & Plan:  Blood pressure is above goal.  Benazepril was stopped in the hospital due to angioedema. Currently on amlodipine 5 mg daily, doxazosin 1 mg daily, metoprolol tartrate 100 mg BID.   Will monitor with up-titration of torsemide to 40 mg daily. Reviewed importance of CPAP compliance, 2 g sodium diet, smoking cessation, and weight control. BMP in 2 weeks. 4. Mixed hyperlipidemia  Assessment & Plan:  Patient is currently on rosuvastatin 40 mg daily, Zetia 10 mg daily, fenofibrate 160 mg daily. Goal LDL < 70. Lipid panel 6/27/2023: C 145. T 237. H 48. L 50. He reports having a "terrible" diet recently. Reviewed dietary modifications. Will monitor TG on upcoming labs and may require further medication adjustments. 5. Obstructive sleep apnea syndrome  Assessment & Plan: We reviewed the correlation between untreated sleep apnea and atrial fibrillation. Urged faithful CPAP use. Referral to sleep medicine for additional treatment. Orders:  -     Ambulatory Referral to Sleep Medicine; Future    6. Tobacco use  Assessment & Plan:  Continues to smoke since 2019. We reviewed the cardiovascular and pulmonary risks associated with smoking. Urged complete cessation. Will trial nicotine gum  Will monitor closely. Orders:  -     nicotine polacrilex (NICORETTE) 2 mg gum; Chew 1 each (2 mg total) as needed for smoking cessation    7. Localized edema  Assessment & Plan:  Increasing, 10 pound weight gain. Admits to high salt diet and inconsistent CPAP use. Increase torsemide to 40 mg daily   BNP ordered. Will monitor closely. Orders:  -     torsemide (DEMADEX) 20 mg tablet; Take 2 tablets (40 mg total) by mouth daily  -     Basic metabolic panel; Future  -     B-Type Natriuretic Peptide(BNP); Future    8. Impaired fasting glucose  Assessment & Plan:  Managed by PCP. Not on prescription treatment at present. Reviewed the importance of glucose control for cardiovascular protection.       9. Stage 3a chronic kidney disease [facility])  Assessment & Plan:  Lab Results   Component Value Date    EGFR 48 08/05/2022    EGFR 45 07/27/2022    EGFR 49 07/22/2022    CREATININE 1.48 (H) 08/05/2022    CREATININE 1.58 (H) 07/27/2022 CREATININE 1.46 (H) 07/22/2022     Will monitor with up-titration of torsemide. Luzmaria Palm a past medical history of CAD, paroxysmal atrial fibrillation on Eliquis, HTN, HLD, obesity, ongoing tobacco use. History of angioedema related to ACE inhibitor. He sustained an MI with VF arrest 2004 at which time he underwent PCI of the right coronary artery.        [place] [facility] with swelling of his face and tongue.  This occurred after eating dinner on 07/09/2020.  The patient was given Benadryl and steroids as well as epinephrine it was felt that the patient likely had developed angioedema from ACE-inhibitor.      He is a long time patient of Dr. Marsh Gottron and re-established with her on 8/12/2020   He had undergone a nuclear stress test 08/11/2019 which showed ejection fraction of 54% and was in normal limits. He noted occasional lower extremity edema. Denied any chest pain, shortness of breath, lightheadedness or dizziness, palpitations.       He followed up 4/8/2021 with Dr. Hailey Beasley had broken his wrist 02/22/2021 and underwent surgical intervention 03/01/2021. Blood pressure had been intermittently labile.  He denied any chest pain.  Patient did present for blood pressure check 02/10/2021 as his blood pressure had been running high. His amlodipine was increased to 5 mg daily. Doxazosin 1 mg daily was added.     Marisa Welsh followed up 5/6/2022. He had gained 22 pounds since his visit the prior year. He had changed jobs and was much less active. He had not been compliant with his CPAP. He continued to smoke, about 1/2 PPD. He denied chest pain but reported progressively worsening dyspnea on exertion along with leg swelling. Updated echocardiogram and nuclear stress testing were ordered.     Echocardiogram 6/23/22 showed preserved LVEF with no significant wall motion or valvular abnormalities.  Lexiscan nuclear stress test 6/23/22 showed an inferior wall scar with significant tk-scar ischemia. Therefore he was referred to undergo cardiac catheterization with nephrology consultation prior given his renal function.     Jon Hatfield underwent cardiac catheterization at [facility] on 7/19/2022 which showed 80% stenosis of the proximal to mid LAD, iFR was significant. 70% stenosis to 1st marginal. 99% stenosis of proximal to mid RCA. He underwent shockwave to the proximal to mid LAD lesion followed by PCI with RO. PCI and RO x 2 placed to proximal to mid RCA lesion. He received 230 mL of IV contrast/ 61.4 minutes of fluro time (excess fluoro time was reviewed with patient by Dr. Hanna Rehman and he was counseled on observing for radiation burn). He did receive pre-hydration per ESTELLE protocol and aggressive fluid resuscitation 150 mL/hr overnight. Admitting creatinine/GFR 1.48/48, discharge creatinine/GFR 1.14/67. He required an IV nitro drip overnight due to significant hypertension. He did go into rapid atrial fibrillation following the intervention. He was started on Eliquis 5 mg BID and Plavix 75 mg daily (no aspirin as per Dr. Hanna Rehman). He followed up 7/27/2022 at which time he reported complete resolution of his shortness of breath since coronary intervention. BP was much improved. He denied palpitations. No bleeding issues. He was using his CPAP faithfully. ECG showed sinus bradycardia. Chest examined with no evidence of radiation burn. 6/29/202Jevon Hatfield presents for routine follow up. His weight is up nearly 10 pounds since his last OV. He reports dyspnea on exertion, edema. He has been eating poorly and not monitoring his sodium consumption. He takes torsemide 20 mg daily. He denies chest pain or palpitations. He reports faithfully taking his medications with no bleeding issues. He admits to inconsistent use of his CPAP. He is not following with sleep medicine currently. He continues to smoke.      Medical Problems     Problem List     Esophagitis    H/O acute myocardial infarction Coronary artery disease involving native coronary artery    S/P angioplasty with stent    Paroxysmal atrial fibrillation (HCC)    HTN (hypertension)    Hyperlipidemia    Sleep apnea    Localized edema    Chronic kidney disease (CKD), stage III (moderate) (HCC)    Dyspnea on exertion    Tobacco use    Impaired fasting glucose        Past Medical History:   Diagnosis Date   • Chronic kidney disease    • Coronary artery disease    • CPAP (continuous positive airway pressure) dependence     Intermittant   • GERD (gastroesophageal reflux disease)    • H/O heart artery stent    • Heart attack ([address])    • Hyperlipidemia    • Hypertension    • MI (myocardial infarction) ([address])    • Myocardial infarction ([address])     2004 x 1 stent   • LUCIO (obstructive sleep apnea)    • Tobacco use      Social History     Socioeconomic History   • Marital status: /Civil Union     Spouse name: Not on file   • Number of children: Not on file   • Years of education: Not on file   • Highest education level: Not on file   Occupational History   • Not on file   Tobacco Use   • Smoking status: Former     Packs/day: 0.25     Types: Cigarettes     Quit date: 2022     Years since quittin.1   • Smokeless tobacco: Never   • Tobacco comments:      Occ Cigs   Vaping Use   • Vaping Use: Never used   Substance and Sexual Activity   • Alcohol use: Not Currently   • Drug use: Never   • Sexual activity: Not on file     Comment: Defer   Other Topics Concern   • Not on file   Social History Narrative   • Not on file     Social Determinants of Health     Financial Resource Strain: Not on file   Food Insecurity: Not on file   Transportation Needs: Not on file   Physical Activity: Not on file   Stress: Not on file   Social Connections: Not on file   Intimate Partner Violence: Not on file   Housing Stability: Not on file      Family History   Problem Relation Age of Onset   • Hypertension Mother    • Arthritis Mother    • Hypertension Father    • Hyperlipidemia Father    • Hypertension Sister    • Hyperlipidemia Sister    • Hyperlipidemia Brother    • Diabetes Paternal Grandmother      Past Surgical History:   Procedure Laterality Date   • BACK SURGERY  2012   • CARDIAC CATHETERIZATION  06/04/2004    PTCA and Cypher stent placement to the RCA(p). • CARDIAC CATHETERIZATION  09/16/2005    LAD(p) 20-30%. Dx 50% ostial. LCx 50%. OM and PLB 50%. RCA 40% ISR. No intervention. EF 60%. • CARDIAC CATHETERIZATION N/A 7/19/2022    Procedure: Cardiac Coronary Angiogram;  Surgeon: Bernarda Montilla MD;  Location: [facility]; Service: Cardiology   • CARDIAC CATHETERIZATION  7/19/2022    Procedure: Cardiac catheterization;  Surgeon: Bernarda Montilla MD;  Location: [facility]; Service: Cardiology   • CARDIAC CATHETERIZATION N/A 7/19/2022    Procedure: Cardiac pci;  Surgeon: Bernarda Montilla MD;  Location: [facility]; Service: Cardiology   • CARDIAC SURGERY     • HERNIA REPAIR     • MS OPTX DSTL RADL I-ARTIC FX/EPIPHYSL SEP 2 FRAG Right 3/1/2021    Procedure: OPEN REDUCTION W/ INTERNAL FIXATION (ORIF) DISTAL RADIUS;  Surgeon: Belkis White MD;  Location: [address] [place] [address];  Service: Orthopedics   • ROTATOR CUFF REPAIR         Current Outpatient Medications:   •  albuterol (PROVENTIL HFA,VENTOLIN HFA) 90 mcg/act inhaler, , Disp: , Rfl:   •  allopurinol (ZYLOPRIM) 300 mg tablet, TAKE (1) TABLET BY MOUTH ONCE DAILY. , Disp: , Rfl:   •  amLODIPine (NORVASC) 5 mg tablet, Take 1 tablet (5 mg total) by mouth daily, Disp: 30 tablet, Rfl: 0  •  apixaban (ELIQUIS) 5 mg, Take 1 tablet (5 mg total) by mouth 2 (two) times a day, Disp: 180 tablet, Rfl: 3  •  co-enzyme Q-10 100 mg capsule, Take 100 mg by mouth daily , Disp: , Rfl:   •  doxazosin (CARDURA) 1 mg tablet, Take 1 tablet (1 mg total) by mouth daily, Disp: 30 tablet, Rfl: 0  •  ezetimibe (ZETIA) 10 mg tablet, Take 10 mg by mouth daily at bedtime , Disp: , Rfl:   •  fenofibrate 160 MG tablet, Take by mouth, Disp: , Rfl:   •  fluticasone (FLONASE) 50 mcg/act nasal spray, 2 sprays into each nostril every 12 (twelve) hours as needed (sinus infections) , Disp: , Rfl:   •  lansoprazole (PREVACID) 30 mg capsule, TAKE 1 CAPSULE BY MOUTH ONCE DAILY. , Disp: , Rfl:   •  metoprolol tartrate (LOPRESSOR) 100 mg tablet, TAKE 1 TABLET BY MOUTH TWICE DAILY. , Disp: , Rfl:   •  nicotine polacrilex (NICORETTE) 2 mg gum, Chew 1 each (2 mg total) as needed for smoking cessation, Disp: 100 each, Rfl: 0  •  potassium chloride (K-DUR,KLOR-CON) 20 mEq tablet, Take 1 tablet (20 mEq total) by mouth daily, Disp: 30 tablet, Rfl: 0  •  rosuvastatin (CRESTOR) 40 MG tablet, TAKE 1 TABLET BY MOUTH ONCE DAILY IN THE EVENING., Disp: , Rfl:   •  torsemide (DEMADEX) 20 mg tablet, Take 2 tablets (40 mg total) by mouth daily, Disp: 60 tablet, Rfl: 11  •  Vascepa 1 g CAPS, , Disp: , Rfl:   •  clopidogrel (PLAVIX) 75 mg tablet, TAKE (1) TABLET BY MOUTH ONCE DAILY. , Disp: 30 tablet, Rfl: 11  •  nitroglycerin (NITROSTAT) 0.4 mg SL tablet, Place 1 tablet (0.4 mg total) under the tongue every 5 (five) minutes as needed for chest pain (Patient not taking: Reported on 6/29/2023), Disp: 20 tablet, Rfl: 0  Allergies   Allergen Reactions   • Ace Inhibitors Angioedema   • Alprazolam Other (See Comments)     Other reaction(s): Other: Document details in comments  SEVERE DISOREINTATION/CRAZY  SEVERE DISOREINTATION/CRAZY     • Buspirone      Other reaction(s): Other (See Comments), Other: Document details in comments  SEVERE DISORIENTATION/CRAZY  SEVERE DISORIENTATION/CRAZY     • Lorazepam      Other reaction(s):  Other (See Comments), Other: Document details in comments  SEVERE DISORIENTATION/CRAZY  SEVERE DISORIENTATION/CRAZY         Labs:     Chemistry        Component Value Date/Time    K 3.6 08/05/2022 0847     08/05/2022 0847    CO2 30 08/05/2022 0847    BUN 15 08/05/2022 0847    CREATININE 1.48 (H) 08/05/2022 0847        Component Value Date/Time    CALCIUM 9.0 08/05/2022 0847    ALKPHOS 138 (H) 07/18/2022 1225    AST 30 07/18/2022 1225    ALT 31 07/18/2022 1225        ECG 6/29/2023: Sinus bradycardia with sinus arrythmia. Poor anterior R wave progression. Rate 58 bpm.    Lipid panel 6/27/2023: C 145. T 237. H 48. L 50. ZIO 7/27-8/10/2022:  Predominantly sinus rhythm with slight P wave morphology changes noted. HR , avg 53 bpm.  183 runs of SVT, longest 53.8 seconds with avg  bpm.  Wenckebach present during sleep. 1.1% PAC burden. Rare PVC's. ECG 7/27/2022: Sinus bradycardia, inferior infarct, age undetermined. Rate 54 bpm.     Cardiac catheterization 7/19/2022:  Prox LAD to Mid LAD lesion is 80% stenosed. · 1st Mrg lesion is 70% stenosed. · Prox RCA to Mid RCA lesion is 99% stenosed. Multivessel CAD. A long 80% stenosis of the proximal and mid LAD was interrogated by iFR and was flow-limiting (iFR=0.82). Successful IVUS-guided PCI of proximal and mid LAD performed. After shockwave lithotripsy and pre-dilation, a 3.0x38 RO was placed under GuiderLiner support. There was no residual stenosis. IVUS-guided PCI of in-stent restenosis of the mid RCA was then performed. A 2.5x38mm RO was placed under GuideLiner support. Because of a possible edge dissection, a 2.5x12mm RO was overlapped at the distal margin of the first stent. There was no residual stenosis. Disease of OM1 was not treated but could be addressed if symptoms recur.      Lexiscan nuclear stress test 6/23/2022:   Post-stress ejection fraction is 60 %. There is mild hypokinesis of the basal to mid inferior wall. Perfusion: There is a medium-sized, moderate intensity perfusion defect in the basal to distal inferior wall, with partial reversibility in the distal inferior wall consistent with myocardial scar with significant tk-scar ischemia.     Echocardiogram 6/23/2022:  EF 60%. Trace MR. Trace TR.      ECG 5/6/2022: Sinus bradycardia with PAC's. Inferior infarct.  Rate 51 bpm.     Lipid 4/1/2022: Triglycerides 268. Direct LDL 69.     Lipid Panel 2/9/2021: C 163. T 215. H 38. L 82.     Echocardiogram 9/18/2020:   EF 55-60%. Upper normal wall thickness. Mild diastolic dysfunction. Mildly dilated left atrium. Trace mitral regurgitation. Trace to mild tricuspid regurgitation. No significant change compared to prior study 05/11/2015.     Nuclear Lexiscan Stress Test 8/12/2019:   Normal study. EF 54%.     Echocardiogram(5/11/2015):  EF 55-60%. Mild MR. Mild TR.     Holter Monitor - (7/6/2004) 27 VE beats. 1 idioventricular episode 2.4 seconds. EKG - (4/15/2016) NSR.70 BPM. Inferior MI. Nuclear Stress Test - (5/11/2015) Mk Baig. No scar. No ischemia. EF 57%.     Cardiac Procedures:     Cardiac Catheterization - (9/16/2005) LM ok. LAD 20-30% proximal stenosis. Dx 50% ostial. LCx 50% OM and PLB with 50%. RCA 40% ISR. No intervention. EF 60%. Cardiac Catheterization - (6/4/2004) PTCA and Cypher stent placement to the Proximal RCA. Percutaneous Coronary Intervention - (6/4/2004) Cypher stent to RCA. Review of Systems   Constitutional: Positive for malaise/fatigue. HENT: Negative. Cardiovascular: Positive for dyspnea on exertion and leg swelling. Negative for chest pain, irregular heartbeat, near-syncope, orthopnea and palpitations. Respiratory: Positive for shortness of breath, sleep disturbances due to breathing and snoring. Negative for cough. Endocrine: Negative. Skin: Negative. Musculoskeletal: Negative. Gastrointestinal: Negative. Genitourinary: Negative. Neurological: Negative. Psychiatric/Behavioral: Negative. Vitals:    06/29/23 1521   BP: 150/80   Pulse:    Temp:    SpO2:      Vitals:    06/29/23 1450   Weight: (!) 148 kg (325 lb 3.2 oz)     Height: 6' 3" (190.5 cm)   Body mass index is 40.65 kg/m². Physical Exam  Vitals and nursing note reviewed. Constitutional:       General: He is not in acute distress. Appearance: He is well-developed.  He is obese. He is not diaphoretic. HENT:      Head: Normocephalic and atraumatic. Neck:      Vascular: No carotid bruit or JVD. Cardiovascular:      Rate and Rhythm: Regular rhythm. Bradycardia present. Pulses: Intact distal pulses. Heart sounds: Normal heart sounds, S1 normal and S2 normal. No murmur heard. No friction rub. No gallop. Comments: Bilateral leg edema  Pulmonary:      Effort: Pulmonary effort is normal. No respiratory distress. Breath sounds: Normal breath sounds. Abdominal:      General: There is no distension. Palpations: Abdomen is soft. Tenderness: There is no abdominal tenderness. Skin:     General: Skin is warm and dry. Findings: No rash. Neurological:      Mental Status: He is alert and oriented to person, place, and time.    Psychiatric:         Behavior: Behavior normal.

## 2023-07-14 DIAGNOSIS — Z95.820 S/P ANGIOPLASTY WITH STENT: ICD-10-CM

## 2023-07-18 RX ORDER — CLOPIDOGREL BISULFATE 75 MG/1
TABLET ORAL
Qty: 30 TABLET | Refills: 11 | Status: SHIPPED | OUTPATIENT
Start: 2023-07-18

## 2023-07-21 ENCOUNTER — TELEPHONE (OUTPATIENT)
Dept: CARDIOLOGY CLINIC | Facility: CLINIC | Age: 68
End: 2023-07-21

## 2023-07-21 NOTE — TELEPHONE ENCOUNTER
Pt lm if would like to know if a BW lab Slip was sent to Dr Brian Foote for pt to have BW done.   Please call pt [phone]

## 2023-07-28 ENCOUNTER — TELEPHONE (OUTPATIENT)
Dept: CARDIOLOGY CLINIC | Facility: CLINIC | Age: 68
End: 2023-07-28

## 2023-07-28 NOTE — TELEPHONE ENCOUNTER
----- Message from Rohan Matute sent at 7/28/2023  3:13 PM EDT -----  Please notify Richard Rittergrim that his kidney function is stable and test for fluid stretch in the heart is only mildly elevated.  How is his swelling/weight with the increased torsemide dose of 40 mg?

## 2023-07-28 NOTE — TELEPHONE ENCOUNTER
Spoke with Pt, Swelling is getting better, Pt lost 8 lbs. BP has been running around 135/90, 138/90. He states he has been feeling better.

## 2023-08-20 NOTE — ASSESSMENT & PLAN NOTE
Blood pressure is above goal.  Benazepril was stopped in the hospital due to angioedema. Currently on amlodipine 5 mg daily, doxazosin 1 mg daily, metoprolol tartrate 100 mg BID. Will monitor with up-titration of torsemide to 40 mg daily. Reviewed importance of CPAP compliance, 2 g sodium diet, smoking cessation, and weight control. BMP in 2 weeks.

## 2023-08-20 NOTE — ASSESSMENT & PLAN NOTE
Coronary Artery Disease:  A. Inferior MI 6/4/2004 with VF arrest.  B. PCI and stent to RCA 6/4/2004. C. Cardiac catheterization 10/2005: LM ok. LAD 20-30% proximal stenosis. Dx 50% ostial. LCx 50% OM and PLB with 50%. RCA 40% ISR. No intervention. EF 60%. D. Non ischemic Lexiscan stress test 8/12/2019. EF 54%. [employer] stress test 6/23/2022 with inferior scar with large periscar ischemia. F. Cardiac cath 7/19/22: 80% LAD(p-m) with significant iFR, 70% 1st OM, 99% RCA(p-m); shockwave/PCI/RO to LAD, PCI and RO x 2 to RCA. - - - - - - - -  Echocardiogram 6/23/2022 with EF 60%. No anginal complaints. Continue Plavix 75 mg daily with Eliquis 5 mg BID. Continue metoprolol tartrate 100 mg BID, rosuvastatin 40 mg daily. Strongly urged smoking cessation. Reviewed s/s to report. Will monitor.

## 2023-08-20 NOTE — ASSESSMENT & PLAN NOTE
Lab Results   Component Value Date    EGFR 48 08/05/2022    EGFR 45 07/27/2022    EGFR 49 07/22/2022    CREATININE 1.48 (H) 08/05/2022    CREATININE 1.58 (H) 07/27/2022    CREATININE 1.46 (H) 07/22/2022     Will monitor with up-titration of torsemide.

## 2023-08-20 NOTE — ASSESSMENT & PLAN NOTE
Managed by PCP. Not on prescription treatment at present. Reviewed the importance of glucose control for cardiovascular protection.

## 2023-08-20 NOTE — ASSESSMENT & PLAN NOTE
Patient noted to go into atrial fibrillation the evening of 7/19/22 post cardiac catheterization with intervention. This is a new diagnosis, although he carries risk factors for a fib. Started on Eliquis 5 mg BID. ECG today shows SB, rate 58 bpm.  14 day ZIO monitor 7/27-8/10/22 showed SVT with no recurrent a fib, avg HR 53 bpm. Charley noted during sleep. Urged faithful CPAP use.

## 2023-08-20 NOTE — ASSESSMENT & PLAN NOTE
Continues to smoke since 2019. We reviewed the cardiovascular and pulmonary risks associated with smoking. Urged complete cessation. Will trial nicotine gum  Will monitor closely.

## 2023-08-20 NOTE — ASSESSMENT & PLAN NOTE
Increasing, 10 pound weight gain. Admits to high salt diet and inconsistent CPAP use. Increase torsemide to 40 mg daily   BNP ordered. Will monitor closely.

## 2023-08-20 NOTE — ASSESSMENT & PLAN NOTE
Patient is currently on rosuvastatin 40 mg daily, Zetia 10 mg daily, fenofibrate 160 mg daily. Goal LDL < 70. Lipid panel 6/27/2023: C 145. T 237. H 48. L 50. He reports having a "terrible" diet recently. Reviewed dietary modifications. Will monitor TG on upcoming labs and may require further medication adjustments.

## 2023-08-20 NOTE — ASSESSMENT & PLAN NOTE
We reviewed the correlation between untreated sleep apnea and atrial fibrillation. Urged faithful CPAP use. Referral to sleep medicine for additional treatment.

## 2023-10-12 ENCOUNTER — OFFICE VISIT (OUTPATIENT)
Dept: CARDIOLOGY CLINIC | Facility: CLINIC | Age: 68
End: 2023-10-12
Payer: COMMERCIAL

## 2023-10-12 VITALS
BODY MASS INDEX: 39.17 KG/M2 | HEIGHT: 75 IN | SYSTOLIC BLOOD PRESSURE: 144 MMHG | HEART RATE: 68 BPM | DIASTOLIC BLOOD PRESSURE: 78 MMHG | OXYGEN SATURATION: 94 % | WEIGHT: 315 LBS

## 2023-10-12 DIAGNOSIS — Z72.0 TOBACCO USE: ICD-10-CM

## 2023-10-12 DIAGNOSIS — I25.10 CORONARY ARTERY DISEASE INVOLVING NATIVE CORONARY ARTERY OF NATIVE HEART WITHOUT ANGINA PECTORIS: Primary | ICD-10-CM

## 2023-10-12 DIAGNOSIS — E78.2 MIXED HYPERLIPIDEMIA: ICD-10-CM

## 2023-10-12 DIAGNOSIS — G47.33 OBSTRUCTIVE SLEEP APNEA SYNDROME: ICD-10-CM

## 2023-10-12 DIAGNOSIS — R60.0 LOCALIZED EDEMA: ICD-10-CM

## 2023-10-12 DIAGNOSIS — N18.31 STAGE 3A CHRONIC KIDNEY DISEASE (HCC): ICD-10-CM

## 2023-10-12 DIAGNOSIS — I48.0 PAROXYSMAL ATRIAL FIBRILLATION (HCC): ICD-10-CM

## 2023-10-12 DIAGNOSIS — R06.09 DYSPNEA ON EXERTION: ICD-10-CM

## 2023-10-12 DIAGNOSIS — I10 PRIMARY HYPERTENSION: ICD-10-CM

## 2023-10-12 PROCEDURE — 99214 OFFICE O/P EST MOD 30 MIN: CPT | Performed by: NURSE PRACTITIONER

## 2023-10-12 RX ORDER — AMLODIPINE BESYLATE 2.5 MG/1
2.5 TABLET ORAL DAILY
Qty: 30 TABLET | Refills: 11 | Status: SHIPPED | OUTPATIENT
Start: 2023-10-12

## 2023-10-12 NOTE — ASSESSMENT & PLAN NOTE
Patient is currently on rosuvastatin 40 mg daily, Zetia 10 mg daily, Vascepa 1 G BID. Goal LDL < 70. Lipid panel 6/27/2023: C 145. T 237. H 48. L 50. Reviewed dietary modifications. Will obtain updated labs and adjust regimen as needed.

## 2023-10-12 NOTE — ASSESSMENT & PLAN NOTE
Stable lower leg edema on today's exam.  Will repeat echo. Urged CPAP use. Add compression socks. Amlodipine may be contributing so will monitor closely with increased dose. Continue torsemide 40 mg daily   BNP ordered. Will monitor closely.

## 2023-10-12 NOTE — PROGRESS NOTES
Cardiology Follow Up    Ken Ramirez  1955  [id number]  [address] CARDIOVASC PHYS  [address]  [address] [place] [address] [phone]  [phone]    Maria Alejandra Rosas presents for routine follow up of CAD, PAF, HTN, HLD. 1. Coronary artery disease involving native coronary artery of native heart without angina pectoris  Assessment & Plan:  Coronary Artery Disease:  A. Inferior MI 6/4/2004 with VF arrest.  B. PCI and stent to RCA 6/4/2004. C. Cardiac catheterization 10/2005: LM ok. LAD 20-30% proximal stenosis. Dx 50% ostial. LCx 50% OM and PLB with 50%. RCA 40% ISR. No intervention. EF 60%. D. Non ischemic Lexiscan stress test 8/12/2019. EF 54%. Lisbeth Gabe stress test 6/23/2022 with inferior scar with large periscar ischemia. F. Cardiac cath 7/19/22: 80% LAD(p-m) with significant iFR, 70% 1st OM, 99% RCA(p-m); shockwave/PCI/RO to LAD, PCI and RO x 2 to RCA. - - - - - - - -  Echocardiogram 6/23/2022 with EF 60%. No anginal complaints. Continue Plavix 75 mg daily with Eliquis 5 mg BID. No aspirin to avoid triple therapy. Continue metoprolol tartrate 100 mg BID, rosuvastatin 40 mg daily. Strongly urged smoking cessation. Reviewed s/s to report. Will monitor. 2. Paroxysmal atrial fibrillation [facility])  Assessment & Plan:  Patient noted to go into atrial fibrillation the evening of 7/19/22 post cardiac catheterization with intervention. This is a new diagnosis, although he carries risk factors for a fib. Started on Eliquis 5 mg BID.  ECG at recent OV SB, rate 58 bpm.  14 day ZIO monitor 7/27-8/10/22 showed SVT with no recurrent a fib, avg HR 53 bpm. Charley noted during sleep. Urged faithful CPAP use. 3. Primary hypertension  Assessment & Plan:  Blood pressure is above goal.  Benazepril was stopped in the hospital due to angioedema. Currently on amlodipine 5 mg daily, doxazosin 1 mg daily, metoprolol tartrate 100 mg BID. Also on torsemide to 40 mg daily.   Reviewed importance of CPAP compliance, 2 g sodium diet, smoking cessation, and weight control. Trial up-titration of amlodipine to 7.5 mg - will watch lower leg swelling. Labs now. Orders:  -     amLODIPine (NORVASC) 2.5 mg tablet; Take 1 tablet (2.5 mg total) by mouth daily Take with 5 mg for total of 7.5 mg daily    4. Mixed hyperlipidemia  Assessment & Plan:  Patient is currently on rosuvastatin 40 mg daily, Zetia 10 mg daily, Vascepa 1 G BID. Goal LDL < 70. Lipid panel 6/27/2023: C 145. T 237. H 48. L 50. Reviewed dietary modifications. Will obtain updated labs and adjust regimen as needed. Orders:  -     Lipid Panel with Direct LDL reflex; Future    5. Obstructive sleep apnea syndrome  Assessment & Plan: We reviewed the correlation between untreated sleep apnea and atrial fibrillation. Urged faithful CPAP use. Referral to sleep medicine for additional treatment. Orders:  -     Ambulatory Referral to Sleep Medicine; Future    6. Stage 3a chronic kidney disease [facility])  Assessment & Plan:  Lab Results   Component Value Date    EGFR 48 08/05/2022    EGFR 45 07/27/2022    EGFR 49 07/22/2022    CREATININE 1.48 (H) 08/05/2022    CREATININE 1.58 (H) 07/27/2022    CREATININE 1.46 (H) 07/22/2022     Labs now        7. Tobacco use  Assessment & Plan:  Continues to smoke since 2019. We reviewed the cardiovascular and pulmonary risks associated with smoking. Urged complete cessation. Will monitor closely. 8. Dyspnea on exertion  Assessment & Plan:  Resolved since cardiac catheterization. Will monitor given residual OM disease. Orders:  -     Echo complete w/ contrast if indicated; Future; Expected date: 10/12/2023  -     Comprehensive metabolic panel  -     B-Type Natriuretic Peptide(BNP); Future    9. Localized edema  Assessment & Plan:  Stable lower leg edema on today's exam.  Will repeat echo. Urged CPAP use. Add compression socks. Amlodipine may be contributing so will monitor closely with increased dose.   Continue torsemide 40 mg daily   BNP ordered. Will monitor closely. HPI   Amarjit Castellon has a past medical history of CAD, paroxysmal atrial fibrillation on Eliquis, HTN, HLD, obesity, ongoing tobacco use. History of angioedema related to ACE inhibitor. He sustained an MI with VF arrest 2004 at which time he underwent PCI of the right coronary artery. He was admitted to [facility] 7/9/2020 with swelling of his face and tongue. This occurred after eating dinner on 07/09/2020. The patient was given Benadryl and steroids as well as epinephrine it was felt that the patient likely had developed angioedema from ACE-inhibitor. He is a long time patient of Dr. Rosalia Walker and re-established with her on 8/12/2020   He had undergone a nuclear stress test 08/11/2019 which showed ejection fraction of 54% and was in normal limits. He noted occasional lower extremity edema. Denied any chest pain, shortness of breath, lightheadedness or dizziness, palpitations. He followed up 4/8/2021 with Dr. Rosalia Walker. He had broken his wrist 02/22/2021 and underwent surgical intervention 03/01/2021. Blood pressure had been intermittently labile. He denied any chest pain. Patient did present for blood pressure check 02/10/2021 as his blood pressure had been running high. His amlodipine was increased to 5 mg daily. Doxazosin 1 mg daily was added. Amarjit Castellon followed up 5/6/2022. He had gained 22 pounds since his visit the prior year. He had changed jobs and was much less active. He had not been compliant with his CPAP. He continued to smoke, about 1/2 PPD. He denied chest pain but reported progressively worsening dyspnea on exertion along with leg swelling. Updated echocardiogram and nuclear stress testing were ordered. Echocardiogram 6/23/22 showed preserved LVEF with no significant wall motion or valvular abnormalities.  Lexiscan nuclear stress test 6/23/22 showed an inferior wall scar with significant tk-scar ischemia. Therefore he was referred to undergo cardiac catheterization with nephrology consultation prior given his renal function. Marianne Riggins underwent cardiac catheterization at [facility] on 7/19/2022 which showed 80% stenosis of the proximal to mid LAD, iFR was significant. 70% stenosis to 1st marginal. 99% stenosis of proximal to mid RCA. He underwent shockwave to the proximal to mid LAD lesion followed by PCI with RO. PCI and RO x 2 placed to proximal to mid RCA lesion. He received 230 mL of IV contrast/ 61.4 minutes of fluro time (excess fluoro time was reviewed with patient by Dr. May Oreilly and he was counseled on observing for radiation burn). He did receive pre-hydration per ESTELLE protocol and aggressive fluid resuscitation 150 mL/hr overnight. Admitting creatinine/GFR 1.48/48, discharge creatinine/GFR 1.14/67. He required an IV nitro drip overnight due to significant hypertension. He did go into rapid atrial fibrillation following the intervention. He was started on Eliquis 5 mg BID and Plavix 75 mg daily (no aspirin as per Dr. May Oreilly). He followed up 7/27/2022 at which time he reported complete resolution of his shortness of breath since coronary intervention. BP was much improved. He denied palpitations. No bleeding issues. He was using his CPAP faithfully. ECG showed sinus bradycardia. Chest examined with no evidence of radiation burn. He followed up on 6/29/2023 at which time his weight was up nearly 10 pounds. He was experiencing dyspnea on exertion and edema. He admitted to dietary indiscretion. He denied chest pain or palpitations. He was not consistently using his CPAP. Reported faithful medication administration. He was smoking. Torsemide was increased to 40 mg daily. 10/12/2023Marianne Riggins presents for follow up. He states he is recovering from a Covid infection 3 weeks ago. He had flu-like symptoms but recovered at home. He denies any lingering respiratory symptoms. No shortness of breath, orthopnea. Stable dyspnea on exertion and lower leg edema. His BP has been running 504-265'U systolic at home. He is not sure of his medications and I called his wife to confirm what he is taking. He denies chest pain, palpitations, lightheadedness. He is preparing for a colonscopy on 10/24 because his Cologuard was positive. He was referred to sleep medicine but states he never heard to schedule an appointment. He continues to smoke but has cut back dramatically - 1 pack lasts him 5 days. He is avoiding alcohol. He stopped drinking sports drinks. He has not been exercising. He states he eats a lot late at night. Has a very sedentary job.     Medical Problems       Problem List       Esophagitis    H/O acute myocardial infarction    Coronary artery disease involving native coronary artery    S/P angioplasty with stent    Paroxysmal atrial fibrillation (HCC)    HTN (hypertension)    Hyperlipidemia    Sleep apnea    Localized edema    Chronic kidney disease (CKD), stage III (moderate) (HCC)    Dyspnea on exertion    Tobacco use    Impaired fasting glucose        Past Medical History:   Diagnosis Date    Chronic kidney disease     Coronary artery disease     CPAP (continuous positive airway pressure) dependence     Intermittant    GERD (gastroesophageal reflux disease)     H/O heart artery stent     Heart attack ([address])     Hyperlipidemia     Hypertension     MI (myocardial infarction) ([address])     Myocardial infarction ([address])     2004 x 1 stent    LUCIO (obstructive sleep apnea)     Tobacco use      Social History     Socioeconomic History    Marital status: /Civil Union     Spouse name: Not on file    Number of children: Not on file    Years of education: Not on file    Highest education level: Not on file   Occupational History    Not on file   Tobacco Use    Smoking status: Former     Packs/day: 0.25     Types: Cigarettes     Quit date: 2022     Years since quittin.2    Smokeless tobacco: Never    Tobacco comments: Occ Cigs   Vaping Use    Vaping Use: Never used   Substance and Sexual Activity    Alcohol use: Not Currently    Drug use: Never    Sexual activity: Not on file     Comment: Defer   Other Topics Concern    Not on file   Social History Narrative    Not on file     Social Determinants of Health     Financial Resource Strain: Not on file   Food Insecurity: Not on file   Transportation Needs: Not on file   Physical Activity: Not on file   Stress: Not on file   Social Connections: Not on file   Intimate Partner Violence: Not on file   Housing Stability: Not on file      Family History   Problem Relation Age of Onset    Hypertension Mother     Arthritis Mother     Hypertension Father     Hyperlipidemia Father     Hypertension Sister     Hyperlipidemia Sister     Hyperlipidemia Brother     Diabetes Paternal Grandmother      Past Surgical History:   Procedure Laterality Date    BACK SURGERY  2012    CARDIAC CATHETERIZATION  06/04/2004    PTCA and Cypher stent placement to the RCA(p). CARDIAC CATHETERIZATION  09/16/2005    LAD(p) 20-30%. Dx 50% ostial. LCx 50%. OM and PLB 50%. RCA 40% ISR. No intervention. EF 60%. CARDIAC CATHETERIZATION N/A 7/19/2022    Procedure: Cardiac Coronary Angiogram;  Surgeon: Yanely Mcdonald MD;  Location: [facility] CARDIAC [facility] LAB; Service: Cardiology    CARDIAC CATHETERIZATION  7/19/2022    Procedure: Cardiac catheterization;  Surgeon: Yanely Mcdonald MD;  Location: [facility] CARDIAC [facility]; Service: Cardiology    CARDIAC CATHETERIZATION N/A 7/19/2022    Procedure: Cardiac pci;  Surgeon: Yanely Mcdonald MD;  Location: [facility] CARDIAC [facility]; Service: Cardiology    CARDIAC SURGERY      HERNIA REPAIR      [place] OPTX DSTL RADL I-ARTIC FX/EPIPHYSL SEP 2 FRAG Right 3/1/2021    Procedure: OPEN REDUCTION W/ INTERNAL FIXATION (ORIF) DISTAL RADIUS;  Surgeon:  Lucian Delarosa MD;  Location: [address] [place] [address] OR;  Service: Orthopedics    ROTATOR CUFF REPAIR         Current Outpatient Medications:     allopurinol (ZYLOPRIM) 300 mg tablet, TAKE (1) TABLET BY MOUTH ONCE DAILY. , Disp: , Rfl:     amLODIPine (NORVASC) 2.5 mg tablet, Take 1 tablet (2.5 mg total) by mouth daily Take with 5 mg for total of 7.5 mg daily, Disp: 30 tablet, Rfl: 11    amLODIPine (NORVASC) 5 mg tablet, Take 1 tablet (5 mg total) by mouth daily, Disp: 30 tablet, Rfl: 0    apixaban (ELIQUIS) 5 mg, Take 1 tablet (5 mg total) by mouth 2 (two) times a day, Disp: 180 tablet, Rfl: 3    clopidogrel (PLAVIX) 75 mg tablet, TAKE (1) TABLET BY MOUTH ONCE DAILY. , Disp: 30 tablet, Rfl: 11    doxazosin (CARDURA) 1 mg tablet, Take 1 tablet (1 mg total) by mouth daily, Disp: 30 tablet, Rfl: 0    ezetimibe (ZETIA) 10 mg tablet, Take 10 mg by mouth daily at bedtime , Disp: , Rfl:     metoprolol tartrate (LOPRESSOR) 100 mg tablet, TAKE 1 TABLET BY MOUTH TWICE DAILY. , Disp: , Rfl:     rosuvastatin (CRESTOR) 40 MG tablet, TAKE 1 TABLET BY MOUTH ONCE DAILY IN THE EVENING., Disp: , Rfl:     torsemide (DEMADEX) 20 mg tablet, Take 2 tablets (40 mg total) by mouth daily, Disp: 60 tablet, Rfl: 11    Vascepa 1 g CAPS, Take 1 capsule by mouth 2 (two) times a day, Disp: , Rfl:     albuterol (PROVENTIL HFA,VENTOLIN HFA) 90 mcg/act inhaler, , Disp: , Rfl:     co-enzyme Q-10 100 mg capsule, Take 100 mg by mouth daily , Disp: , Rfl:     fluticasone (FLONASE) 50 mcg/act nasal spray, 2 sprays into each nostril every 12 (twelve) hours as needed (sinus infections) , Disp: , Rfl:     lansoprazole (PREVACID) 30 mg capsule, TAKE 1 CAPSULE BY MOUTH ONCE DAILY. , Disp: , Rfl:     nitroglycerin (NITROSTAT) 0.4 mg SL tablet, Place 1 tablet (0.4 mg total) under the tongue every 5 (five) minutes as needed for chest pain (Patient not taking: Reported on 6/29/2023), Disp: 20 tablet, Rfl: 0    potassium chloride (K-DUR,KLOR-CON) 20 mEq tablet, Take 1 tablet (20 mEq total) by mouth daily, Disp: 30 tablet, Rfl: 0  Allergies   Allergen Reactions    Ace Inhibitors Angioedema    Alprazolam Other (See Comments)     Other reaction(s): Other: Document details in comments  SEVERE DISOREINTATION/CRAZY  SEVERE DISOREINTATION/CRAZY      Buspirone      Other reaction(s): Other (See Comments), Other: Document details in comments  SEVERE DISORIENTATION/CRAZY  SEVERE DISORIENTATION/CRAZY      Lorazepam      Other reaction(s): Other (See Comments), Other: Document details in comments  SEVERE DISORIENTATION/CRAZY  SEVERE DISORIENTATION/CRAZY         Labs:     Chemistry        Component Value Date/Time    K 3.6 08/05/2022 0847     08/05/2022 0847    CO2 30 08/05/2022 0847    BUN 15 08/05/2022 0847    CREATININE 1.48 (H) 08/05/2022 0847        Component Value Date/Time    CALCIUM 9.0 08/05/2022 0847    ALKPHOS 138 (H) 07/18/2022 1225    AST 30 07/18/2022 1225    ALT 31 07/18/2022 1225        ECG 6/29/2023: Sinus bradycardia with sinus arrythmia. Poor anterior R wave progression. Rate 58 bpm.     Lipid panel 6/27/2023: C 145. T 237. H 48. L 50. ZIO 7/27-8/10/2022:  Predominantly sinus rhythm with slight P wave morphology changes noted. HR , avg 53 bpm.  183 runs of SVT, longest 53.8 seconds with avg  bpm.  Wenckebach present during sleep. 1.1% PAC burden. Rare PVC's. ECG 7/27/2022: Sinus bradycardia, inferior infarct, age undetermined. Rate 54 bpm.     Cardiac catheterization 7/19/2022:  Prox LAD to Mid LAD lesion is 80% stenosed. · 1st Mrg lesion is 70% stenosed. · Prox RCA to Mid RCA lesion is 99% stenosed. Multivessel CAD. A long 80% stenosis of the proximal and mid LAD was interrogated by iFR and was flow-limiting (iFR=0.82). Successful IVUS-guided PCI of proximal and mid LAD performed. After shockwave lithotripsy and pre-dilation, a 3.0x38 RO was placed under GuiderLiner support. There was no residual stenosis. IVUS-guided PCI of in-stent restenosis of the mid RCA was then performed. A 2.5x38mm RO was placed under GuideLiner support.  Because of a possible edge dissection, a 2.5x12mm RO was overlapped at the distal margin of the first stent. There was no residual stenosis. Disease of OM1 was not treated but could be addressed if symptoms recur. Lexiscan nuclear stress test 6/23/2022:   Post-stress ejection fraction is 60 %. There is mild hypokinesis of the basal to mid inferior wall. Perfusion: There is a medium-sized, moderate intensity perfusion defect in the basal to distal inferior wall, with partial reversibility in the distal inferior wall consistent with myocardial scar with significant tk-scar ischemia. Echocardiogram 6/23/2022:  EF 60%. Trace MR. Trace TR.      ECG 5/6/2022: Sinus bradycardia with PAC's. Inferior infarct. Rate 51 bpm.     Lipid 4/1/2022: Triglycerides 268. Direct LDL 69. Lipid Panel 2/9/2021: C 163. T 215. H 38. L 82. Echocardiogram 9/18/2020:   EF 55-60%. Upper normal wall thickness. Mild diastolic dysfunction. Mildly dilated left atrium. Trace mitral regurgitation. Trace to mild tricuspid regurgitation. No significant change compared to prior study 05/11/2015. Nuclear Lexiscan Stress Test 8/12/2019:   Normal study. EF 54%. Echocardiogram(5/11/2015):  EF 55-60%. Mild MR. Mild TR. Holter Monitor - (7/6/2004) 27 VE beats. 1 idioventricular episode 2.4 seconds. EKG - (4/15/2016) NSR.70 BPM. Inferior MI. Nuclear Stress Test - (5/11/2015) Goldie Black. No scar. No ischemia. EF 57%. Cardiac Procedures:     Cardiac Catheterization - (9/16/2005) LM ok. LAD 20-30% proximal stenosis. Dx 50% ostial. LCx 50% OM and PLB with 50%. RCA 40% ISR. No intervention. EF 60%. Cardiac Catheterization - (6/4/2004) PTCA and Cypher stent placement to the Proximal RCA. Percutaneous Coronary Intervention - (6/4/2004) Cypher stent to RCA. Review of Systems   Constitutional: Negative. HENT: Negative. Cardiovascular:  Positive for dyspnea on exertion and leg swelling. Negative for chest pain, irregular heartbeat, near-syncope, orthopnea and palpitations.    Respiratory: Negative for cough and snoring. Endocrine: Negative. Skin: Negative. Musculoskeletal: Negative. Gastrointestinal: Negative. Genitourinary: Negative. Neurological: Negative. Psychiatric/Behavioral: Negative. Vitals:    10/12/23 1344   BP: 144/78   Pulse: 68   SpO2: 94%     Vitals:    10/12/23 1344   Weight: (!) 148 kg (326 lb)     Height: 6' 3" (190.5 cm)   Body mass index is 40.75 kg/m². Physical Exam  Vitals and nursing note reviewed. Constitutional:       General: He is not in acute distress. Appearance: He is well-developed. He is obese. He is not diaphoretic. HENT:      Head: Normocephalic and atraumatic. Neck:      Vascular: No carotid bruit or JVD. Cardiovascular:      Rate and Rhythm: Normal rate and regular rhythm. Pulses: Intact distal pulses. Heart sounds: Normal heart sounds, S1 normal and S2 normal. No murmur heard. No friction rub. No gallop. Pulmonary:      Effort: Pulmonary effort is normal. No respiratory distress. Breath sounds: Normal breath sounds. Abdominal:      General: There is no distension. Palpations: Abdomen is soft. Tenderness: There is no abdominal tenderness. Skin:     General: Skin is warm and dry. Findings: No rash. Neurological:      Mental Status: He is alert and oriented to person, place, and time.    Psychiatric:         Behavior: Behavior normal.

## 2023-10-12 NOTE — ASSESSMENT & PLAN NOTE
Patient noted to go into atrial fibrillation the evening of 7/19/22 post cardiac catheterization with intervention. This is a new diagnosis, although he carries risk factors for a fib. Started on Eliquis 5 mg BID.  ECG at recent OV SB, rate 58 bpm.  14 day ZIO monitor 7/27-8/10/22 showed SVT with no recurrent a fib, avg HR 53 bpm. Charley noted during sleep. Urged faithful CPAP use.

## 2023-10-12 NOTE — PATIENT INSTRUCTIONS
I feel strongly that if you lose weight you will feel better. Check into the maintenance program at [facility] cardiac rehab in [facility]:  [address], [address]  Phone  [phone]    Continue to work on smoking cessation. I put in a new referral to sleep medicine to address sleep apnea - please let me know if you do not hear to schedule in 2 weeks. I ordered an updated echocardiogram and lab work. Based on lab results I will let you know about adjusting your cholesterol medication and torsemide. Increase amlodipine to 7.5 mg daily - this is an added 2.5 mg pill to your 5 mg pill. Update me next week on your blood pressures. For your colonoscopy - hold Eliquis 48 hours prior. Resume asap after the procedure. Hold Plavix 5-7 days before (ask your GI). I want you taking aspirin 81 mg daily while off the Plavix. Please let GI know that also. After the colonoscopy start back on Plavix and stop aspirin.

## 2023-10-12 NOTE — ASSESSMENT & PLAN NOTE
Coronary Artery Disease:  A. Inferior MI 6/4/2004 with VF arrest.  B. PCI and stent to RCA 6/4/2004. C. Cardiac catheterization 10/2005: LM ok. LAD 20-30% proximal stenosis. Dx 50% ostial. LCx 50% OM and PLB with 50%. RCA 40% ISR. No intervention. EF 60%. D. Non ischemic Lexiscan stress test 8/12/2019. EF 54%. Candy Drummer stress test 6/23/2022 with inferior scar with large periscar ischemia. F. Cardiac cath 7/19/22: 80% LAD(p-m) with significant iFR, 70% 1st OM, 99% RCA(p-m); shockwave/PCI/RO to LAD, PCI and RO x 2 to RCA. - - - - - - - -  Echocardiogram 6/23/2022 with EF 60%. No anginal complaints. Continue Plavix 75 mg daily with Eliquis 5 mg BID. No aspirin to avoid triple therapy. Continue metoprolol tartrate 100 mg BID, rosuvastatin 40 mg daily. Strongly urged smoking cessation. Reviewed s/s to report. Will monitor.

## 2023-10-12 NOTE — ASSESSMENT & PLAN NOTE
Lab Results   Component Value Date    EGFR 48 08/05/2022    EGFR 45 07/27/2022    EGFR 49 07/22/2022    CREATININE 1.48 (H) 08/05/2022    CREATININE 1.58 (H) 07/27/2022    CREATININE 1.46 (H) 07/22/2022     Labs now

## 2023-10-12 NOTE — ASSESSMENT & PLAN NOTE
Continues to smoke since 2019. We reviewed the cardiovascular and pulmonary risks associated with smoking. Urged complete cessation. Will monitor closely.

## 2023-11-02 ENCOUNTER — HOSPITAL ENCOUNTER (OUTPATIENT)
Dept: NON INVASIVE DIAGNOSTICS | Facility: HOSPITAL | Age: 68
Discharge: HOME | End: 2023-11-02
Payer: COMMERCIAL

## 2023-11-02 VITALS
HEART RATE: 80 BPM | HEIGHT: 75 IN | WEIGHT: 315 LBS | DIASTOLIC BLOOD PRESSURE: 80 MMHG | SYSTOLIC BLOOD PRESSURE: 140 MMHG | BODY MASS INDEX: 39.17 KG/M2

## 2023-11-02 DIAGNOSIS — R06.09 DYSPNEA ON EXERTION: ICD-10-CM

## 2023-11-02 LAB
AORTIC ROOT: 3.1 CM
AORTIC VALVE MEAN VELOCITY: 13 M/S
APICAL FOUR CHAMBER EJECTION FRACTION: 59 %
AV AREA BY CONTINUOUS VTI: 2.3 CM2
AV AREA PEAK VELOCITY: 1.9 CM2
AV LVOT MEAN GRADIENT: 2 MMHG
AV LVOT PEAK GRADIENT: 4 MMHG
AV MEAN GRADIENT: 8 MMHG
AV PEAK GRADIENT: 16 MMHG
AV VALVE AREA: 2.26 CM2
AV VELOCITY RATIO: 0.54
DOP CALC AO PEAK VEL: 1.96 M/S
DOP CALC AO VTI: 41.8 CM
DOP CALC LVOT AREA: 3.46 CM2
DOP CALC LVOT CARDIAC INDEX: 5.79 L/MIN/M2
DOP CALC LVOT CARDIAC OUTPUT: 15.65 L/MIN
DOP CALC LVOT DIAMETER: 2.1 CM
DOP CALC LVOT PEAK VEL VTI: 27.33 CM
DOP CALC LVOT PEAK VEL: 1.05 M/S
DOP CALC LVOT STROKE INDEX: 35.2 ML/M2
DOP CALC LVOT STROKE VOLUME: 94.61
E WAVE DECELERATION TIME: 295 MS
E/A RATIO: 0.89
FRACTIONAL SHORTENING: 37 (ref 28–44)
INTERVENTRICULAR SEPTUM IN DIASTOLE (PARASTERNAL SHORT AXIS VIEW): 1.4 CM
INTERVENTRICULAR SEPTUM: 1.4 CM (ref 0.6–1.1)
LAAS-AP2: 21.6 CM2
LAAS-AP4: 17.6 CM2
LEFT ATRIUM SIZE: 4.1 CM
LEFT ATRIUM VOLUME (MOD BIPLANE): 60 ML
LEFT ATRIUM VOLUME INDEX (MOD BIPLANE): 22.2 ML/M2
LEFT INTERNAL DIMENSION IN SYSTOLE: 3.1 CM (ref 2.1–4)
LEFT VENTRICULAR INTERNAL DIMENSION IN DIASTOLE: 4.9 CM (ref 3.5–6)
LEFT VENTRICULAR POSTERIOR WALL IN END DIASTOLE: 1.2 CM
LEFT VENTRICULAR STROKE VOLUME: 76 ML
LVSV (TEICH): 76 ML
MV E'TISSUE VEL-SEP: 10 CM/S
MV PEAK A VEL: 1.08 M/S
MV PEAK E VEL: 96 CM/S
MV STENOSIS PRESSURE HALF TIME: 85 MS
MV VALVE AREA P 1/2 METHOD: 2.59
RA PRESSURE ESTIMATED: 3 MMHG
RIGHT ATRIUM AREA SYSTOLE A4C: 13.7 CM2
RIGHT VENTRICLE ID DIMENSION: 3.4 CM
RV PSP: 34 MMHG
SL CV LEFT ATRIUM LENGTH A2C: 5.6 CM
SL CV LV EF: 60
SL CV PED ECHO LEFT VENTRICLE DIASTOLIC VOLUME (MOD BIPLANE) 2D: 113 ML
SL CV PED ECHO LEFT VENTRICLE SYSTOLIC VOLUME (MOD BIPLANE) 2D: 38 ML
TR MAX PG: 31 MMHG
TR PEAK VELOCITY: 2.8 M/S
TRICUSPID VALVE PEAK REGURGITATION VELOCITY: 2.76 M/S

## 2023-11-02 PROCEDURE — 93306 TTE W/DOPPLER COMPLETE: CPT

## 2023-11-02 RX ADMIN — PERFLUTREN 2 ML/MIN: 6.52 INJECTION, SUSPENSION INTRAVENOUS at 15:22

## 2023-11-13 DIAGNOSIS — E87.6 HYPOKALEMIA: Primary | ICD-10-CM

## 2023-11-13 RX ORDER — POTASSIUM CHLORIDE 20 MEQ/1
20 TABLET, EXTENDED RELEASE ORAL DAILY
Qty: 30 TABLET | Refills: 11 | Status: SHIPPED | OUTPATIENT
Start: 2023-11-13

## 2023-11-13 NOTE — TELEPHONE ENCOUNTER
Please let me know if refills for potassium are needed. I ordered a BMP to recheck potassium in 2 weeks. Thank you!

## 2023-11-13 NOTE — TELEPHONE ENCOUNTER
Please let Neil Gonzalez know that his labs show stable kidney function. His potassium is mildly low at 3.4. Is he taking his daily potassium supplement? If yes, I will increase to 40 mEq daily. His triglycerides were mildly elevated. LDL at goal at 51. We will need to continue to work on blood sugar control and monitor. His BNP (test for fluid stretch in the heart) is normal at 88 (normal < 100). Thank you!

## 2023-11-15 DIAGNOSIS — I48.91 ATRIAL FIBRILLATION (HCC): ICD-10-CM

## 2023-12-11 ENCOUNTER — PATIENT MESSAGE (OUTPATIENT)
Dept: CARDIOLOGY CLINIC | Facility: CLINIC | Age: 68
End: 2023-12-11

## 2023-12-11 DIAGNOSIS — E78.2 MIXED HYPERLIPIDEMIA: Primary | ICD-10-CM

## 2023-12-11 RX ORDER — EZETIMIBE 10 MG/1
10 TABLET ORAL
Qty: 90 TABLET | Refills: 3 | Status: SHIPPED | OUTPATIENT
Start: 2023-12-11

## 2024-01-02 DIAGNOSIS — I48.91 ATRIAL FIBRILLATION (HCC): ICD-10-CM

## 2024-01-12 ENCOUNTER — OFFICE VISIT (OUTPATIENT)
Dept: CARDIOLOGY CLINIC | Facility: CLINIC | Age: 69
End: 2024-01-12
Payer: COMMERCIAL

## 2024-01-12 VITALS
BODY MASS INDEX: 39.17 KG/M2 | TEMPERATURE: 97.9 F | OXYGEN SATURATION: 93 % | HEIGHT: 75 IN | DIASTOLIC BLOOD PRESSURE: 80 MMHG | WEIGHT: 315 LBS | HEART RATE: 50 BPM | SYSTOLIC BLOOD PRESSURE: 132 MMHG

## 2024-01-12 DIAGNOSIS — Z72.0 TOBACCO USE: ICD-10-CM

## 2024-01-12 DIAGNOSIS — R60.0 LOCALIZED EDEMA: ICD-10-CM

## 2024-01-12 DIAGNOSIS — I10 PRIMARY HYPERTENSION: ICD-10-CM

## 2024-01-12 DIAGNOSIS — N18.31 STAGE 3A CHRONIC KIDNEY DISEASE (HCC): ICD-10-CM

## 2024-01-12 DIAGNOSIS — I25.10 CORONARY ARTERY DISEASE INVOLVING NATIVE CORONARY ARTERY OF NATIVE HEART WITHOUT ANGINA PECTORIS: Primary | ICD-10-CM

## 2024-01-12 DIAGNOSIS — E78.2 MIXED HYPERLIPIDEMIA: ICD-10-CM

## 2024-01-12 DIAGNOSIS — G47.33 OBSTRUCTIVE SLEEP APNEA SYNDROME: ICD-10-CM

## 2024-01-12 DIAGNOSIS — I48.0 PAROXYSMAL ATRIAL FIBRILLATION (HCC): ICD-10-CM

## 2024-01-12 DIAGNOSIS — R00.1 BRADYCARDIA: ICD-10-CM

## 2024-01-12 PROCEDURE — 99214 OFFICE O/P EST MOD 30 MIN: CPT | Performed by: NURSE PRACTITIONER

## 2024-01-12 RX ORDER — METOPROLOL TARTRATE 50 MG/1
50 TABLET, FILM COATED ORAL 2 TIMES DAILY
Qty: 60 TABLET | Refills: 11 | Status: SHIPPED | OUTPATIENT
Start: 2024-01-12

## 2024-01-12 NOTE — ASSESSMENT & PLAN NOTE
Patient noted to go into atrial fibrillation the evening of 7/19/22 post cardiac catheterization with intervention.  This is a new diagnosis, although he carries risk factors for a fib.  Started on Eliquis 5 mg BID.  ECG at recent OV SB, rate 58 bpm.  14 day ZIO monitor 7/27-8/10/22 showed SVT with no recurrent a fib, avg HR 53 bpm. Charley noted during sleep.  Urged faithful CPAP use.  Now with symptomatic bradycardia.  Will reduce metoprolol tartrate to 50 mg BID and obtain updated 24 hour Holter monitor.

## 2024-01-12 NOTE — ASSESSMENT & PLAN NOTE
HR noted to be in the high 40's to low 50's on exam today.  ZIO monitor 8/2022 with avg HR 53 bpm.  Patient does complain of fatigue.  TSH 2.5 on recent labs.  Needs treatment of LUCIO.  Reduce metoprolol tartrate to 50 mg BID and will obtain updated 24 hour Holter monitor.  We reviewed urgent s/s to report.  Will monitor closely.   Acitretin Pregnancy And Lactation Text: This medication is Pregnancy Category X and should not be given to women who are pregnant or may become pregnant in the future. This medication is excreted in breast milk.

## 2024-01-12 NOTE — PROGRESS NOTES
Cardiology Follow Up    Terry Kemmerling  1955  [id number]  [facility] [place] [address]  [address]  [facility] [address] [phone]    Tomasz presents for routine follow up of CAD, PAF, HTN, HLD.      1. Coronary artery disease involving native coronary artery of native heart without angina pectoris  Assessment & Plan:  Coronary Artery Disease:  A. Inferior MI 6/4/2004 with VF arrest.  B. PCI and stent to RCA 6/4/2004.  C. Cardiac catheterization 10/2005: LM ok. LAD 20-30% proximal stenosis. Dx 50% ostial. LCx 50% OM and PLB with 50%. RCA 40% ISR. No intervention. EF 60%.  D. Non ischemic Lexiscan stress test 8/12/2019. EF 54%.  E. Lexiscan stress test 6/23/2022 with inferior scar with large periscar ischemia.  F. Cardiac cath 7/19/22: 80% LAD(p-m) with significant iFR, 70% 1st OM, 99% RCA(p-m); shockwave/PCI/RO to LAD, PCI and RO x 2 to RCA.  - - - - - - - -  Echocardiogram 11/2023 with EF 60%.  No anginal complaints.  Continue Plavix 75 mg daily with Eliquis 5 mg BID. No aspirin to avoid triple therapy.  Continue metoprolol tartrate, rosuvastatin 40 mg daily.  Strongly urged smoking cessation.  Reviewed s/s to report.  Will monitor.      2. Paroxysmal atrial fibrillation (HCC)  Assessment & Plan:  Patient noted to go into atrial fibrillation the evening of 7/19/22 post cardiac catheterization with intervention.  This is a new diagnosis, although he carries risk factors for a fib.  Started on Eliquis 5 mg BID.  ECG at recent OV SB, rate 58 bpm.  14 day ZIO monitor 7/27-8/10/22 showed SVT with no recurrent a fib, avg HR 53 bpm. Charley noted during sleep.  Urged faithful CPAP use.  Now with symptomatic bradycardia.  Will reduce metoprolol tartrate to 50 mg BID and obtain updated 24 hour Holter monitor.    Orders:  -     metoprolol tartrate (LOPRESSOR) 50 mg tablet; Take 1 tablet (50 mg total) by mouth 2 (two) times a day    3. Primary  hypertension  Assessment & Plan:  Blood pressure is borderline.  Benazepril was stopped in the hospital due to angioedema.  Currently on amlodipine 7.5 mg daily, doxazosin 1 mg daily, metoprolol tartrate 100 mg BID.  Also on torsemide to 40 mg daily.  Reducing metoprolol today to 50 mg BID due to bradycardia and fatigue.  Reviewed importance of CPAP compliance, 2 g sodium diet, smoking cessation, and weight control.  He will monitor BP at home and report readings > 140/90.      4. Mixed hyperlipidemia  Assessment & Plan:  Patient is currently on rosuvastatin 40 mg daily, Zetia 10 mg daily, Vascepa 1 G BID.  Goal LDL < 70.  Lipid panel 10/27/2023: C 132. T 195. H 42. L 51.  Continue current regimen.  Will need up-titration of vascepa if TG remain above goal on next set of labs.      5. Obstructive sleep apnea syndrome  Assessment & Plan:  We reviewed the correlation between untreated sleep apnea and atrial fibrillation.  Urged faithful CPAP use.  Referral to sleep medicine for additional treatment.    Orders:  -     Ambulatory Referral to Sleep Medicine; Future    6. Localized edema  Assessment & Plan:  Mild lower leg edema, likely related to amlodipine use.  BNP 88 on recent labs.  Echo 11/2/2023 with EF 60%, mild DD.  Continue use of  compression socks.  Continue torsemide 40 mg daily.  Will continue to monitor. He will report worsening symptoms. May need to reduce amlodipine if swelling continues.      7. Stage 3a chronic kidney disease (HCC)  Assessment & Plan:  Lab Results   Component Value Date    EGFR 48 08/05/2022    EGFR 45 07/27/2022    EGFR 49 07/22/2022    CREATININE 1.48 (H) 08/05/2022    CREATININE 1.58 (H) 07/27/2022    CREATININE 1.46 (H) 07/22/2022     Stable on recent blood work.      8. Bradycardia  Assessment & Plan:  HR noted to be in the high 40's to low 50's on exam today.  ZIO monitor 8/2022 with avg HR 53 bpm.  Patient does complain of fatigue.  TSH 2.5 on recent labs.  Needs treatment of  LUCIO.  Reduce metoprolol tartrate to 50 mg BID and will obtain updated 24 hour Holter monitor.  We reviewed urgent s/s to report.  Will monitor closely.    Orders:  -     Holter monitor; Future; Expected date: 01/12/2024    9. Tobacco use  Assessment & Plan:  Continues to smoke since 2019.  We reviewed the cardiovascular and pulmonary risks associated with smoking.  Urged complete cessation.  Will monitor closely.           LYSSA Tolbert has a past medical history of CAD, paroxysmal atrial fibrillation on Eliquis, HTN, HLD, obesity, ongoing tobacco use. History of angioedema related to ACE inhibitor.     He sustained an MI with VF arrest 2004 at which time he underwent PCI of the right coronary artery.        He was admitted to [facility] 7/9/2020 with swelling of his face and tongue.  This occurred after eating dinner on 07/09/2020.  The patient was given Benadryl and steroids as well as epinephrine it was felt that the patient likely had developed angioedema from ACE-inhibitor.      He is a long time patient of Dr. Boyd and re-established with her on 8/12/2020   He had undergone a nuclear stress test 08/11/2019 which showed ejection fraction of 54% and was in normal limits. He noted occasional lower extremity edema. Denied any chest pain, shortness of breath, lightheadedness or dizziness, palpitations.       He followed up 4/8/2021 with Dr. Boyd. He had broken his wrist 02/22/2021 and underwent surgical intervention 03/01/2021. Blood pressure had been intermittently labile.  He denied any chest pain.  Patient did present for blood pressure check 02/10/2021 as his blood pressure had been running high. His amlodipine was increased to 5 mg daily. Doxazosin 1 mg daily was added.     Tomasz followed up 5/6/2022. He had gained 22 pounds since his visit the prior year. He had changed jobs and was much less active. He had not been compliant with his CPAP. He continued to smoke, about 1/2 PPD. He denied chest  pain but reported progressively worsening dyspnea on exertion along with leg swelling. Updated echocardiogram and nuclear stress testing were ordered.     Echocardiogram 6/23/22 showed preserved LVEF with no significant wall motion or valvular abnormalities. Lexiscan nuclear stress test 6/23/22 showed an inferior wall scar with significant tk-scar ischemia. Therefore he was referred to undergo cardiac catheterization with nephrology consultation prior given his renal function.     Tomasz underwent cardiac catheterization at [facility] on 7/19/2022 which showed 80% stenosis of the proximal to mid LAD, iFR was significant. 70% stenosis to 1st marginal. 99% stenosis of proximal to mid RCA. He underwent shockwave to the proximal to mid LAD lesion followed by PCI with RO. PCI and RO x 2 placed to proximal to mid RCA lesion. He received 230 mL of IV contrast/ 61.4 minutes of fluro time (excess fluoro time was reviewed with patient by Dr. Gomes and he was counseled on observing for radiation burn). He did receive pre-hydration per ESTELLE protocol and aggressive fluid resuscitation 150 mL/hr overnight. Admitting creatinine/GFR 1.48/48, discharge creatinine/GFR 1.14/67. He required an IV nitro drip overnight due to significant hypertension. He did go into rapid atrial fibrillation following the intervention. He was started on Eliquis 5 mg BID and Plavix 75 mg daily (no aspirin as per Dr. Gomes).      He followed up 7/27/2022 at which time he reported complete resolution of his shortness of breath since coronary intervention. BP was much improved. He denied palpitations. No bleeding issues. He was using his CPAP faithfully. ECG showed sinus bradycardia. Chest examined with no evidence of radiation burn.     He followed up on 6/29/2023 at which time his weight was up nearly 10 pounds. He was experiencing dyspnea on exertion and edema. He admitted to dietary indiscretion. He denied chest pain or palpitations. He was not consistently  using his CPAP. Reported faithful medication administration. He was smoking. Torsemide was increased to 40 mg daily.     At 10/12/2023 follow up we increased amlodipine to 7.5 mg daily to optimize BP. He stopped drinking electrolyte drinks. He continued to smoke. He was scheduled for a colonoscopy 10/24/2023. He was referred back to sleep medicine as he had never scheduled an appointment.     1/12/2024: Tomasz presents for follow up. He has gained some weight, which he attributes to holiday eating. He does continue with lower leg swelling which is better in the mornings. He has been wearing compression socks. He denies shortness of breath/dyspnea on exertion or orthopnea. He c/o fatigue. He states he could fall asleep anywhere. No lightheadedness/dizziness. No palpitations. HR today is noted to be in the 40-50's. He has not been monitoring his HR at home. No chest pain. He states BP is running 130-140's/80's at home. He continues to smoke. We reviewed his recent blood work. BNP was normal at 88. LDL 51 and . Renal function and potassium are improved on recent labs. He states he never heard to schedule with sleep medicine.    Medical Problems       Problem List       Esophagitis    H/O acute myocardial infarction    Coronary artery disease involving native coronary artery    S/P angioplasty with stent    Paroxysmal atrial fibrillation (HCC)    HTN (hypertension)    Hyperlipidemia    Sleep apnea    Localized edema    Chronic kidney disease (CKD), stage III (moderate) ([facility])    Dyspnea on exertion    Tobacco use    Impaired fasting glucose        Past Medical History:   Diagnosis Date    Chronic kidney disease     Coronary artery disease     CPAP (continuous positive airway pressure) dependence     Intermittant    GERD (gastroesophageal reflux disease)     H/O heart artery stent     Heart attack (HCC)     Hyperlipidemia     Hypertension     MI (myocardial infarction) ([facility])     Myocardial infarction ([facility])     2004 x  1 stent    LUCIO (obstructive sleep apnea)     Tobacco use      Social History     Socioeconomic History    Marital status: /Civil Union     Spouse name: Not on file    Number of children: Not on file    Years of education: Not on file    Highest education level: Not on file   Occupational History    Not on file   Tobacco Use    Smoking status: Former     Current packs/day: 0.00     Types: Cigarettes     Quit date: 2022     Years since quittin.5    Smokeless tobacco: Never    Tobacco comments:     Occ Cigs   Vaping Use    Vaping status: Never Used   Substance and Sexual Activity    Alcohol use: Not Currently    Drug use: Never    Sexual activity: Not on file     Comment: Defer   Other Topics Concern    Not on file   Social History Narrative    Not on file     Social Determinants of Health     Financial Resource Strain: Low Risk  (2023)    Received from [employer]    Overall Financial Resource Strain (CARDIA)     Difficulty of Paying Living Expenses: Not hard at all   Food Insecurity: No Food Insecurity (2023)    Received from [employer]    Hunger Vital Sign     Worried About Running Out of Food in the Last Year: Never true     Ran Out of Food in the Last Year: Never true   Transportation Needs: No Transportation Needs (2023)    Received from [employer]    PRAPARE - Transportation     Lack of Transportation (Medical): No     Lack of Transportation (Non-Medical): No   Physical Activity: Not on file   Stress: Not on file   Social Connections: Socially Integrated (2023)    Received from [facility]    Social Connection and Isolation Panel [NHANES]     Frequency of Communication with Friends and Family: More than three times a week     Frequency of Social Gatherings with Friends and Family: Twice a week     Attends Ep[employer] Services: More than 4 times per year     Active Member of Clubs or Organizations: Yes     Attends Club or Organization Meetings: More  than 4 times per year     Marital Status:    Intimate Partner Violence: Not At Risk (6/22/2023)    Received from [facility]    Humiliation, Afraid, Rape, and Kick questionnaire     Fear of Current or Ex-Partner: No     Emotionally Abused: No     Physically Abused: No     Sexually Abused: No   Housing Stability: Unknown (9/13/2023)    Received from [facility]    Housing Stability Vital Sign     Unable to Pay for Housing in the Last Year: No     Number of Places Lived in the Last Year: Not on file     Unstable Housing in the Last Year: No      Family History   Problem Relation Age of Onset    Hypertension Mother     Arthritis Mother     Hypertension Father     Hyperlipidemia Father     Hypertension Sister     Hyperlipidemia Sister     Hyperlipidemia Brother     Diabetes Paternal Grandmother      Past Surgical History:   Procedure Laterality Date    BACK SURGERY  2012    CARDIAC CATHETERIZATION  06/04/2004    PTCA and Cypher stent placement to the RCA(p).     CARDIAC CATHETERIZATION  09/16/2005    LAD(p) 20-30%. Dx 50% ostial. LCx 50%. OM and PLB 50%. RCA 40% ISR. No intervention. EF 60%.    CARDIAC CATHETERIZATION N/A 7/19/2022    Procedure: Cardiac Coronary Angiogram;  Surgeon: Daniel Gomes MD;  Location:  CARDIAC CATH LAB;  Service: Cardiology    CARDIAC CATHETERIZATION  7/19/2022    Procedure: Cardiac catheterization;  Surgeon: Daniel Gomes MD;  Location:  CARDIAC CATH LAB;  Service: Cardiology    CARDIAC CATHETERIZATION N/A 7/19/2022    Procedure: Cardiac pci;  Surgeon: Daniel Gomes MD;  Location:  CARDIAC CATH LAB;  Service: Cardiology    CARDIAC SURGERY      HERNIA REPAIR      IN OPTX DSTL RADL I-ARTIC FX/EPIPHYSL SEP 2 FRAG Right 3/1/2021    Procedure: OPEN REDUCTION W/ INTERNAL FIXATION (ORIF) DISTAL RADIUS;  Surgeon: Cyrus Fishman MD;  Location:  MAIN OR;  Service: Orthopedics    ROTATOR CUFF REPAIR         Current Outpatient Medications:     albuterol (PROVENTIL  HFA,VENTOLIN HFA) 90 mcg/act inhaler, , Disp: , Rfl:     allopurinol (ZYLOPRIM) 300 mg tablet, TAKE (1) TABLET BY MOUTH ONCE DAILY., Disp: , Rfl:     amLODIPine (NORVASC) 2.5 mg tablet, Take 1 tablet (2.5 mg total) by mouth daily Take with 5 mg for total of 7.5 mg daily, Disp: 30 tablet, Rfl: 11    amLODIPine (NORVASC) 5 mg tablet, Take 1 tablet (5 mg total) by mouth daily, Disp: 30 tablet, Rfl: 0    apixaban (ELIQUIS) 5 mg, Take 1 tablet (5 mg total) by mouth 2 (two) times a day, Disp: 180 tablet, Rfl: 3    clopidogrel (PLAVIX) 75 mg tablet, TAKE (1) TABLET BY MOUTH ONCE DAILY., Disp: 30 tablet, Rfl: 11    co-enzyme Q-10 100 mg capsule, Take 100 mg by mouth daily , Disp: , Rfl:     doxazosin (CARDURA) 1 mg tablet, Take 1 tablet (1 mg total) by mouth daily, Disp: 30 tablet, Rfl: 0    ezetimibe (ZETIA) 10 mg tablet, Take 1 tablet (10 mg total) by mouth daily at bedtime, Disp: 90 tablet, Rfl: 3    fluticasone (FLONASE) 50 mcg/act nasal spray, 2 sprays into each nostril every 12 (twelve) hours as needed (sinus infections) , Disp: , Rfl:     lansoprazole (PREVACID) 30 mg capsule, TAKE 1 CAPSULE BY MOUTH ONCE DAILY., Disp: , Rfl:     metoprolol tartrate (LOPRESSOR) 50 mg tablet, Take 1 tablet (50 mg total) by mouth 2 (two) times a day, Disp: 60 tablet, Rfl: 11    potassium chloride (K-DUR,KLOR-CON) 20 mEq tablet, Take 1 tablet (20 mEq total) by mouth daily, Disp: 30 tablet, Rfl: 11    rosuvastatin (CRESTOR) 40 MG tablet, TAKE 1 TABLET BY MOUTH ONCE DAILY IN THE EVENING., Disp: , Rfl:     torsemide (DEMADEX) 20 mg tablet, Take 2 tablets (40 mg total) by mouth daily, Disp: 60 tablet, Rfl: 11    Vascepa 1 g CAPS, Take 1 capsule by mouth 2 (two) times a day, Disp: , Rfl:     nitroglycerin (NITROSTAT) 0.4 mg SL tablet, Place 1 tablet (0.4 mg total) under the tongue every 5 (five) minutes as needed for chest pain (Patient not taking: Reported on 6/29/2023), Disp: 20 tablet, Rfl: 0  Allergies   Allergen Reactions    Ace  Inhibitors Angioedema    Alprazolam Other (See Comments)     Other reaction(s): Other: Document details in comments  SEVERE DISOREINTATION/CRAZY  SEVERE DISOREINTATION/CRAZY      Benazepril Other (See Comments)    Buspirone      Other reaction(s): Other (See Comments), Other: Document details in comments  SEVERE DISORIENTATION/CRAZY  SEVERE DISORIENTATION/CRAZY      Lorazepam      Other reaction(s): Other (See Comments), Other: Document details in comments  SEVERE DISORIENTATION/CRAZY  SEVERE DISORIENTATION/CRAZY         Labs:     Chemistry        Component Value Date/Time    K 3.6 08/05/2022 0847     08/05/2022 0847    CO2 30 08/05/2022 0847    BUN 15 08/05/2022 0847    CREATININE 1.48 (H) 08/05/2022 0847        Component Value Date/Time    CALCIUM 9.0 08/05/2022 0847    ALKPHOS 138 (H) 07/18/2022 1225    AST 30 07/18/2022 1225    ALT 31 07/18/2022 1225        Echo 11/2/2023:  LVEF 60%, mild LVH. Grade 1 DD.  Mild fusion of left and noncoronary cusps of aortic valve.  Mild TR. PASP 34mmHg.    Lipid panel 10/27/2023: C 132. T 195. H 42. L 51.    ECG 6/29/2023: Sinus bradycardia with sinus arrythmia. Poor anterior R wave progression. Rate 58 bpm.     Lipid panel 6/27/2023: C 145. T 237. H 48. L 50.     ZIO 7/27-8/10/2022:  Predominantly sinus rhythm with slight P wave morphology changes noted.  HR , avg 53 bpm.  183 runs of SVT, longest 53.8 seconds with avg  bpm.  Wenckebach present during sleep.  1.1% PAC burden. Rare PVC's.     ECG 7/27/2022: Sinus bradycardia, inferior infarct, age undetermined. Rate 54 bpm.     Cardiac catheterization 7/19/2022:  Prox LAD to Mid LAD lesion is 80% stenosed. · 1st Mrg lesion is 70% stenosed. · Prox RCA to Mid RCA lesion is 99% stenosed.  Multivessel CAD. A long 80% stenosis of the proximal and mid LAD was interrogated by iFR and was flow-limiting (iFR=0.82).  Successful IVUS-guided PCI of proximal and mid LAD performed. After shockwave lithotripsy and  pre-dilation, a 3.0x38 RO was placed under GuiderLiner support. There was no residual stenosis. IVUS-guided PCI of in-stent restenosis of the mid RCA was then performed. A 2.5x38mm RO was placed under GuideLiner support. Because of a possible edge dissection, a 2.5x12mm RO was overlapped at the distal margin of the first stent. There was no residual stenosis. Disease of OM1 was not treated but could be addressed if symptoms recur.      Lexiscan nuclear stress test 6/23/2022:   Post-stress ejection fraction is 60 %. There is mild hypokinesis of the basal to mid inferior wall.  Perfusion: There is a medium-sized, moderate intensity perfusion defect in the basal to distal inferior wall, with partial reversibility in the distal inferior wall consistent with myocardial scar with significant tk-scar ischemia.     Echocardiogram 6/23/2022:  EF 60%. Trace MR. Trace TR.      ECG 5/6/2022: Sinus bradycardia with PAC's. Inferior infarct. Rate 51 bpm.     Lipid 4/1/2022: Triglycerides 268. Direct LDL 69.     Lipid Panel 2/9/2021: C 163. T 215. H 38. L 82.     Echocardiogram 9/18/2020:   EF 55-60%.  Upper normal wall thickness.  Mild diastolic dysfunction.  Mildly dilated left atrium.  Trace mitral regurgitation.  Trace to mild tricuspid regurgitation.  No significant change compared to prior study 05/11/2015.     Nuclear Lexiscan Stress Test 8/12/2019:   Normal study. EF 54%.     Echocardiogram(5/11/2015):  EF 55-60%.  Mild MR. Mild TR.     Holter Monitor - (7/6/2004) 27 VE beats. 1 idioventricular episode 2.4 seconds.  EKG - (4/15/2016) NSR.70 BPM. Inferior MI.  Nuclear Stress Test - (5/11/2015) Lexiscan. No scar. No ischemia. EF 57%.     Cardiac Procedures:     Cardiac Catheterization - (9/16/2005) LM ok. LAD 20-30% proximal stenosis. Dx 50% ostial. LCx 50% OM and PLB with 50%. RCA 40% ISR. No intervention. EF 60%.  Cardiac Catheterization - (6/4/2004) PTCA and Cypher stent placement to the Proximal RCA.  Percutaneous  "Coronary Intervention - (6/4/2004) Cypher stent to RCA.    Review of Systems   Constitutional: Positive for malaise/fatigue.   HENT: Negative.     Cardiovascular:  Positive for leg swelling. Negative for chest pain, dyspnea on exertion, irregular heartbeat, near-syncope, orthopnea and palpitations.   Respiratory:  Positive for snoring. Negative for cough.    Endocrine: Negative.    Skin: Negative.    Musculoskeletal: Negative.    Gastrointestinal: Negative.    Genitourinary: Negative.    Neurological: Negative.    Psychiatric/Behavioral: Negative.         Vitals:    01/12/24 0902   BP: 132/80   Pulse: (!) 50   Temp:    SpO2:      Vitals:    01/12/24 0812   Weight: (!) 150 kg (331 lb 3.2 oz)     Height: 6' 3\" (190.5 cm)   Body mass index is 41.4 kg/m².    Physical Exam  Vitals and nursing note reviewed.   Constitutional:       General: He is not in acute distress.     Appearance: He is well-developed. He is obese. He is not diaphoretic.   HENT:      Head: Normocephalic and atraumatic.   Neck:      Vascular: No carotid bruit or JVD.   Cardiovascular:      Rate and Rhythm: Regular rhythm. Bradycardia present.      Pulses: Intact distal pulses.      Heart sounds: Normal heart sounds, S1 normal and S2 normal. No murmur heard.     No friction rub. No gallop.      Comments: Bilateral lower leg edema  Pulmonary:      Effort: Pulmonary effort is normal. No respiratory distress.      Breath sounds: Normal breath sounds.   Abdominal:      General: There is no distension.      Palpations: Abdomen is soft.      Tenderness: There is no abdominal tenderness.   Skin:     General: Skin is warm and dry.      Findings: No rash.   Neurological:      Mental Status: He is alert and oriented to person, place, and time.   Psychiatric:         Behavior: Behavior normal.                "

## 2024-01-12 NOTE — PATIENT INSTRUCTIONS
Reduce metoprolol to 50 mg twice daily. Monitor your heart rate on your BP cuff.  24 hour Holter monitor scheduled today to assess your heart rates.   I put in a new referral to sleep medicine to get the sleep apnea addressed as this may be contributing.  Please let me know if your fatigue is not improving, report immediately any lightheadedness.  Let me know if your leg swelling does not improve.

## 2024-01-12 NOTE — ASSESSMENT & PLAN NOTE
Patient is currently on rosuvastatin 40 mg daily, Zetia 10 mg daily, Vascepa 1 G BID.  Goal LDL < 70.  Lipid panel 10/27/2023: C 132. T 195. H 42. L 51.  Continue current regimen.  Will need up-titration of vascepa if TG remain above goal on next set of labs.

## 2024-01-12 NOTE — ASSESSMENT & PLAN NOTE
Blood pressure is borderline.  Benazepril was stopped in the hospital due to angioedema.  Currently on amlodipine 7.5 mg daily, doxazosin 1 mg daily, metoprolol tartrate 100 mg BID.  Also on torsemide to 40 mg daily.  Reducing metoprolol today to 50 mg BID due to bradycardia and fatigue.  Reviewed importance of CPAP compliance, 2 g sodium diet, smoking cessation, and weight control.  He will monitor BP at home and report readings > 140/90.

## 2024-01-12 NOTE — ASSESSMENT & PLAN NOTE
Mild lower leg edema, likely related to amlodipine use.  BNP 88 on recent labs.  Echo 11/2/2023 with EF 60%, mild DD.  Continue use of  compression socks.  Continue torsemide 40 mg daily.  Will continue to monitor. He will report worsening symptoms. May need to reduce amlodipine if swelling continues.

## 2024-01-12 NOTE — ASSESSMENT & PLAN NOTE
Coronary Artery Disease:  A. Inferior MI 6/4/2004 with VF arrest.  B. PCI and stent to RCA 6/4/2004.  C. Cardiac catheterization 10/2005: LM ok. LAD 20-30% proximal stenosis. Dx 50% ostial. LCx 50% OM and PLB with 50%. RCA 40% ISR. No intervention. EF 60%.  D. Non ischemic Lexiscan stress test 8/12/2019. EF 54%.  E. Lexiscan stress test 6/23/2022 with inferior scar with large periscar ischemia.  F. Cardiac cath 7/19/22: 80% LAD(p-m) with significant iFR, 70% 1st OM, 99% RCA(p-m); shockwave/PCI/RO to LAD, PCI and RO x 2 to RCA.  - - - - - - - -  Echocardiogram 11/2023 with EF 60%.  No anginal complaints.  Continue Plavix 75 mg daily with Eliquis 5 mg BID. No aspirin to avoid triple therapy.  Continue metoprolol tartrate, rosuvastatin 40 mg daily.  Strongly urged smoking cessation.  Reviewed s/s to report.  Will monitor.

## 2024-01-12 NOTE — ASSESSMENT & PLAN NOTE
Lab Results   Component Value Date    EGFR 48 08/05/2022    EGFR 45 07/27/2022    EGFR 49 07/22/2022    CREATININE 1.48 (H) 08/05/2022    CREATININE 1.58 (H) 07/27/2022    CREATININE 1.46 (H) 07/22/2022     Stable on recent blood work.

## 2024-01-29 ENCOUNTER — HOSPITAL ENCOUNTER (OUTPATIENT)
Dept: NON INVASIVE DIAGNOSTICS | Facility: HOSPITAL | Age: 69
Discharge: HOME | End: 2024-01-29
Payer: COMMERCIAL

## 2024-01-29 DIAGNOSIS — R00.1 BRADYCARDIA: ICD-10-CM

## 2024-01-29 PROCEDURE — 93226 XTRNL ECG REC<48 HR SCAN A/R: CPT

## 2024-01-29 PROCEDURE — 93225 XTRNL ECG REC<48 HRS REC: CPT

## 2024-02-01 PROCEDURE — 93227 XTRNL ECG REC<48 HR R&I: CPT | Performed by: INTERNAL MEDICINE

## 2024-02-02 ENCOUNTER — TELEPHONE (OUTPATIENT)
Dept: CARDIOLOGY CLINIC | Facility: CLINIC | Age: 69
End: 2024-02-02

## 2024-02-02 NOTE — TELEPHONE ENCOUNTER
----- Message from BLESSING [place] sent at 2/1/2024  5:14 PM EST -----  Please let Tomasz know his Holter showed sinus rhythm with an avg HR of 59 bpm. No concerning slow heart rates or arrhythmias. Continue current dose of metoprolol tartrate 50 mg twice daily.  Thank you!

## 2024-02-24 ENCOUNTER — HOSPITAL ENCOUNTER (EMERGENCY)
Facility: HOSPITAL | Age: 69
Discharge: HOME | End: 2024-02-24
Attending: EMERGENCY MEDICINE
Payer: COMMERCIAL

## 2024-02-24 ENCOUNTER — APPOINTMENT (EMERGENCY)
Dept: RADIOLOGY | Facility: HOSPITAL | Age: 69
End: 2024-02-24
Payer: COMMERCIAL

## 2024-02-24 VITALS
OXYGEN SATURATION: 95 % | RESPIRATION RATE: 20 BRPM | SYSTOLIC BLOOD PRESSURE: 135 MMHG | DIASTOLIC BLOOD PRESSURE: 75 MMHG | TEMPERATURE: 98.6 F | HEART RATE: 63 BPM

## 2024-02-24 DIAGNOSIS — M25.561 ACUTE PAIN OF RIGHT KNEE: Primary | ICD-10-CM

## 2024-02-24 PROCEDURE — 73564 X-RAY EXAM KNEE 4 OR MORE: CPT

## 2024-02-24 PROCEDURE — 99284 EMERGENCY DEPT VISIT MOD MDM: CPT | Performed by: EMERGENCY MEDICINE

## 2024-02-24 PROCEDURE — 99283 EMERGENCY DEPT VISIT LOW MDM: CPT

## 2024-02-24 RX ORDER — OXYCODONE HYDROCHLORIDE AND ACETAMINOPHEN 5; 325 MG/1; MG/1
1 TABLET ORAL EVERY 4 HOURS PRN
Qty: 10 TABLET | Refills: 0 | Status: SHIPPED | OUTPATIENT
Start: 2024-02-24

## 2024-02-24 NOTE — ED PROVIDER NOTES
"History  Chief Complaint   Patient presents with    Knee Injury     Last week states \"pop\" in left knee when standing. States increasing pain throughout the week and difficulty walking     Patient went to stand last week and felt a pop in his right knee.  Has been having increasing pain since.  No direct trauma.  Advil and Motrin without any relief.  No history of previous injuries.  No fevers or chills.  No nausea vomiting or diarrhea.      History provided by:  Patient   used: No    Knee Pain  Location:  Knee  Time since incident:  1 week  Injury: yes    Pain details:     Quality:  Aching    Radiates to:  Does not radiate    Severity:  Mild    Onset quality:  Gradual    Duration:  1 week    Timing:  Constant    Progression:  Worsening  Chronicity:  New  Prior injury to area:  No  Relieved by:  Nothing  Worsened by:  Bearing weight  Ineffective treatments:  NSAIDs  Associated symptoms: swelling    Associated symptoms: no back pain, no decreased ROM, no fever and no neck pain        Prior to Admission Medications   Prescriptions Last Dose Informant Patient Reported? Taking?   Vascepa 1 g CAPS   Yes No   Sig: Take 1 capsule by mouth 2 (two) times a day   albuterol (PROVENTIL HFA,VENTOLIN HFA) 90 mcg/act inhaler   Yes No   allopurinol (ZYLOPRIM) 300 mg tablet   Yes No   Sig: TAKE (1) TABLET BY MOUTH ONCE DAILY.   amLODIPine (NORVASC) 2.5 mg tablet   No No   Sig: Take 1 tablet (2.5 mg total) by mouth daily Take with 5 mg for total of 7.5 mg daily   amLODIPine (NORVASC) 5 mg tablet   No No   Sig: Take 1 tablet (5 mg total) by mouth daily   apixaban (ELIQUIS) 5 mg   No No   Sig: Take 1 tablet (5 mg total) by mouth 2 (two) times a day   clopidogrel (PLAVIX) 75 mg tablet   No No   Sig: TAKE (1) TABLET BY MOUTH ONCE DAILY.   co-enzyme Q-10 100 mg capsule   Yes No   Sig: Take 100 mg by mouth daily    doxazosin (CARDURA) 1 mg tablet   No No   Sig: Take 1 tablet (1 mg total) by mouth daily   ezetimibe " (ZETIA) 10 mg tablet   No No   Sig: Take 1 tablet (10 mg total) by mouth daily at bedtime   fluticasone (FLONASE) 50 mcg/act nasal spray  Spouse/Significant Other Yes No   Si sprays into each nostril every 12 (twelve) hours as needed (sinus infections)    lansoprazole (PREVACID) 30 mg capsule   Yes No   Sig: TAKE 1 CAPSULE BY MOUTH ONCE DAILY.   metoprolol tartrate (LOPRESSOR) 50 mg tablet   No No   Sig: Take 1 tablet (50 mg total) by mouth 2 (two) times a day   nitroglycerin (NITROSTAT) 0.4 mg SL tablet   No No   Sig: Place 1 tablet (0.4 mg total) under the tongue every 5 (five) minutes as needed for chest pain   Patient not taking: Reported on 2023   potassium chloride (K-DUR,KLOR-CON) 20 mEq tablet   No No   Sig: Take 1 tablet (20 mEq total) by mouth daily   rosuvastatin (CRESTOR) 40 MG tablet   Yes No   Sig: TAKE 1 TABLET BY MOUTH ONCE DAILY IN THE EVENING.   torsemide (DEMADEX) 20 mg tablet   No No   Sig: Take 2 tablets (40 mg total) by mouth daily      Facility-Administered Medications: None       Past Medical History:   Diagnosis Date    Chronic kidney disease     Coronary artery disease     CPAP (continuous positive airway pressure) dependence     Intermittant    GERD (gastroesophageal reflux disease)     H/O heart artery stent     Heart attack (HCC)     Hyperlipidemia     Hypertension     MI (myocardial infarction) (HCC)     Myocardial infarction (HCC)     2004 x 1 stent    LUCIO (obstructive sleep apnea)     Tobacco use        Past Surgical History:   Procedure Laterality Date    BACK SURGERY      CARDIAC CATHETERIZATION  2004    PTCA and Cypher stent placement to the RCA(p).     CARDIAC CATHETERIZATION  2005    LAD(p) 20-30%. Dx 50% ostial. LCx 50%. OM and PLB 50%. RCA 40% ISR. No intervention. EF 60%.    CARDIAC CATHETERIZATION N/A 2022    Procedure: Cardiac Coronary Angiogram;  Surgeon: Daniel Gomes MD;  Location: [facility];  Service: Cardiology    CARDIAC  CATHETERIZATION  2022    Procedure: Cardiac catheterization;  Surgeon: Daniel Gomes MD;  Location: [facility];  Service: Cardiology    CARDIAC CATHETERIZATION N/A 2022    Procedure: Cardiac pci;  Surgeon: Daniel Gomes MD;  Location:  [facility];  Service: Cardiology    CARDIAC SURGERY      HERNIA REPAIR      OH OPTX DSTL RADL I-ARTIC FX/EPIPHYSL SEP 2 FRAG Right 3/1/2021    Procedure: OPEN REDUCTION W/ INTERNAL FIXATION (ORIF) DISTAL RADIUS;  Surgeon: Cyrus Fishman MD;  Location:  MAIN OR;  Service: Orthopedics    ROTATOR CUFF REPAIR         Family History   Problem Relation Age of Onset    Hypertension Mother     Arthritis Mother     Hypertension Father     Hyperlipidemia Father     Hypertension Sister     Hyperlipidemia Sister     Hyperlipidemia Brother     Diabetes Paternal Grandmother      I have reviewed and agree with the history as documented.    E-Cigarette/Vaping    E-Cigarette Use Never User      E-Cigarette/Vaping Substances    Nicotine No     THC No     CBD No     Flavoring No     Other No     Unknown No      Social History     Tobacco Use    Smoking status: Former     Current packs/day: 0.00     Types: Cigarettes     Quit date: 2022     Years since quittin.6    Smokeless tobacco: Never    Tobacco comments:     Occ Cigs   Vaping Use    Vaping status: Never Used   Substance Use Topics    Alcohol use: Not Currently    Drug use: Never       Review of Systems   Constitutional:  Negative for chills and fever.   HENT:  Negative for ear pain, hearing loss, sore throat, trouble swallowing and voice change.    Eyes:  Negative for pain and discharge.   Respiratory:  Negative for cough, shortness of breath and wheezing.    Cardiovascular:  Negative for chest pain and palpitations.   Gastrointestinal:  Negative for abdominal pain, blood in stool, constipation, diarrhea, nausea and vomiting.   Genitourinary:  Negative for dysuria, flank pain, frequency and hematuria.    Musculoskeletal:  Positive for arthralgias and joint swelling. Negative for back pain, neck pain and neck stiffness.   Skin:  Negative for rash and wound.   Neurological:  Negative for dizziness, seizures, syncope, facial asymmetry and headaches.   Psychiatric/Behavioral:  Negative for hallucinations, self-injury and suicidal ideas.    All other systems reviewed and are negative.      Physical Exam  Physical Exam  Constitutional:       General: He is not in acute distress.     Appearance: Normal appearance. He is not ill-appearing.   HENT:      Head: Normocephalic and atraumatic.      Right Ear: External ear normal.      Left Ear: External ear normal.      Nose: Nose normal.      Mouth/Throat:      Mouth: Mucous membranes are moist.   Eyes:      Extraocular Movements: Extraocular movements intact.      Pupils: Pupils are equal, round, and reactive to light.   Cardiovascular:      Rate and Rhythm: Normal rate and regular rhythm.   Pulmonary:      Effort: Pulmonary effort is normal. No respiratory distress.      Breath sounds: Normal breath sounds.   Abdominal:      General: Abdomen is flat. Bowel sounds are normal. There is no distension.      Palpations: Abdomen is soft.      Tenderness: There is no abdominal tenderness.   Musculoskeletal:         General: Swelling and tenderness present.      Cervical back: Normal range of motion and neck supple.      Comments: Right knee with full range of motion including extension.  Tender along the medial aspect.  No joint line tenderness.  Diffuse erythema.  No warmth.  No induration.   Skin:     General: Skin is warm and dry.      Capillary Refill: Capillary refill takes less than 2 seconds.   Neurological:      General: No focal deficit present.      Mental Status: He is alert and oriented to person, place, and time.   Psychiatric:         Mood and Affect: Mood normal.         Behavior: Behavior normal.         Vital Signs  ED Triage Vitals   Temperature Pulse Respirations  Blood Pressure SpO2   02/24/24 0918 02/24/24 0918 02/24/24 0918 02/24/24 0920 02/24/24 0918   98.6 °F (37 °C) 63 20 135/75 95 %      Temp Source Heart Rate Source Patient Position - Orthostatic VS BP Location FiO2 (%)   02/24/24 0918 -- -- -- --   Temporal          Pain Score       02/24/24 0918       7           Vitals:    02/24/24 0918 02/24/24 0920   BP:  135/75   Pulse: 63          Visual Acuity      ED Medications  Medications - No data to display    Diagnostic Studies  Results Reviewed       None                   XR knee 4+ vw right injury   ED Interpretation by Efrain Anders MD (02/24 0934)   No fracture                 Procedures  Splint application    Date/Time: 2/24/2024 9:36 AM    Performed by: Efrain Anders MD  Authorized by: Efrain Anders MD  [place] Protocol:  Consent: Verbal consent obtained.    Procedure details:     Laterality:  Right    Location:  Knee    Knee:  R kneeCast type:     Cast type: Ace wrap to right knee.  Post-procedure details:     Pain:  Unchanged    Sensation:  Normal    Patient tolerance of procedure:  Tolerated well, no immediate complications           ED Course                               SBIRT 20yo+      Flowsheet Row Most Recent Value   Initial Alcohol Screen: US AUDIT-C     1. How often do you have a drink containing alcohol? 0 Filed at: 02/24/2024 0918   2. How many drinks containing alcohol do you have on a typical day you are drinking?  0 Filed at: 02/24/2024 0918   3a. Male UNDER 65: How often do you have five or more drinks on one occasion? 0 Filed at: 02/24/2024 0918   Audit-C Score 0 Filed at: 02/24/2024 0918   PADMINI: How many times in the past year have you...    Used an illegal drug or used a prescription medication for non-medical reasons? Never Filed at: 02/24/2024 0918                      Medical Decision Making  Amount and/or Complexity of Data Reviewed  Radiology: ordered and independent interpretation performed.    Risk  Prescription drug  management.             Disposition  Final diagnoses:   Acute pain of right knee     Time reflects when diagnosis was documented in both MDM as applicable and the Disposition within this note       Time User Action Codes Description Comment    2/24/2024  9:35 AM Efrain Anders [M25.561] Acute pain of right knee           ED Disposition       ED Disposition   Discharge    Condition   Stable    Date/Time   Sat Feb 24, 2024 0935    Comment   Terry Kemmerling discharge to home/self care.                   Follow-up Information       Follow up With Specialties Details Why Contact Info    [facility] Call in 2 days  [address] [facility]  [address] [place] [id number]  [phone]              Patient's Medications   Discharge Prescriptions    OXYCODONE-ACETAMINOPHEN (PERCOCET) 5-325 MG PER TABLET    Take 1 tablet by mouth every 4 (four) hours as needed for moderate pain for up to 10 doses Max Daily Amount: 6 tablets       Start Date: 2/24/2024 End Date: --       Order Dose: 1 tablet       Quantity: 10 tablet    Refills: 0       No discharge procedures on file.    PDMP Review         Value Time User    PDMP Reviewed  Yes 3/1/2021  3:01 PM Tisha Lopez PA-C            ED Provider  Electronically Signed by             Efrain Anders MD  02/24/24 0936       Efrain Anders MD  02/24/24 0937

## 2024-05-16 ENCOUNTER — OFFICE VISIT (OUTPATIENT)
Dept: CARDIOLOGY CLINIC | Facility: CLINIC | Age: 69
End: 2024-05-16
Payer: COMMERCIAL

## 2024-05-16 VITALS
SYSTOLIC BLOOD PRESSURE: 136 MMHG | OXYGEN SATURATION: 99 % | WEIGHT: 315 LBS | BODY MASS INDEX: 39.17 KG/M2 | HEIGHT: 75 IN | HEART RATE: 88 BPM | DIASTOLIC BLOOD PRESSURE: 82 MMHG | TEMPERATURE: 98.6 F

## 2024-05-16 DIAGNOSIS — G47.33 OBSTRUCTIVE SLEEP APNEA SYNDROME: ICD-10-CM

## 2024-05-16 DIAGNOSIS — Z95.820 S/P ANGIOPLASTY WITH STENT: ICD-10-CM

## 2024-05-16 DIAGNOSIS — I10 PRIMARY HYPERTENSION: ICD-10-CM

## 2024-05-16 DIAGNOSIS — J06.9 UPPER RESPIRATORY TRACT INFECTION, UNSPECIFIED TYPE: ICD-10-CM

## 2024-05-16 DIAGNOSIS — I25.10 CORONARY ARTERY DISEASE INVOLVING NATIVE CORONARY ARTERY OF NATIVE HEART WITHOUT ANGINA PECTORIS: Primary | ICD-10-CM

## 2024-05-16 DIAGNOSIS — Z72.0 TOBACCO USE: ICD-10-CM

## 2024-05-16 DIAGNOSIS — I48.0 PAROXYSMAL ATRIAL FIBRILLATION (HCC): ICD-10-CM

## 2024-05-16 DIAGNOSIS — N18.31 STAGE 3A CHRONIC KIDNEY DISEASE (HCC): ICD-10-CM

## 2024-05-16 DIAGNOSIS — E78.2 MIXED HYPERLIPIDEMIA: ICD-10-CM

## 2024-05-16 DIAGNOSIS — R60.0 LOCALIZED EDEMA: ICD-10-CM

## 2024-05-16 DIAGNOSIS — E87.6 HYPOKALEMIA: ICD-10-CM

## 2024-05-16 PROCEDURE — 99214 OFFICE O/P EST MOD 30 MIN: CPT | Performed by: NURSE PRACTITIONER

## 2024-05-16 RX ORDER — TORSEMIDE 20 MG/1
40 TABLET ORAL DAILY
Qty: 180 TABLET | Refills: 3 | Status: SHIPPED | OUTPATIENT
Start: 2024-05-16

## 2024-05-16 RX ORDER — METOPROLOL TARTRATE 50 MG/1
50 TABLET, FILM COATED ORAL 2 TIMES DAILY
Qty: 180 TABLET | Refills: 3 | Status: SHIPPED | OUTPATIENT
Start: 2024-05-16

## 2024-05-16 RX ORDER — POTASSIUM CHLORIDE 20 MEQ/1
20 TABLET, EXTENDED RELEASE ORAL DAILY
Qty: 90 TABLET | Refills: 3 | Status: SHIPPED | OUTPATIENT
Start: 2024-05-16

## 2024-05-16 RX ORDER — AMLODIPINE BESYLATE 2.5 MG/1
2.5 TABLET ORAL DAILY
Qty: 90 TABLET | Refills: 3 | Status: SHIPPED | OUTPATIENT
Start: 2024-05-16

## 2024-05-16 RX ORDER — EZETIMIBE 10 MG/1
10 TABLET ORAL
Qty: 90 TABLET | Refills: 3 | Status: SHIPPED | OUTPATIENT
Start: 2024-05-16

## 2024-05-16 RX ORDER — AMLODIPINE BESYLATE 5 MG/1
5 TABLET ORAL DAILY
Qty: 90 TABLET | Refills: 3 | Status: SHIPPED | OUTPATIENT
Start: 2024-05-16

## 2024-05-16 RX ORDER — BENZONATATE 100 MG/1
100 CAPSULE ORAL 3 TIMES DAILY PRN
Qty: 20 CAPSULE | Refills: 0 | Status: SHIPPED | OUTPATIENT
Start: 2024-05-16

## 2024-05-16 RX ORDER — DOXAZOSIN MESYLATE 1 MG/1
1 TABLET ORAL DAILY
Qty: 90 TABLET | Refills: 3 | Status: SHIPPED | OUTPATIENT
Start: 2024-05-16

## 2024-05-16 RX ORDER — CLOPIDOGREL BISULFATE 75 MG/1
75 TABLET ORAL DAILY
Qty: 90 TABLET | Refills: 3 | Status: SHIPPED | OUTPATIENT
Start: 2024-05-16

## 2024-05-16 NOTE — ASSESSMENT & PLAN NOTE
Mild lower leg edema, likely related to amlodipine use.  BNP 88 on recent labs.  Echo 11/2/2023 with EF 60%, mild DD.  Continue use of  compression socks.  Continue torsemide 40 mg daily.  Will continue to monitor as this is presently improved

## 2024-05-16 NOTE — ASSESSMENT & PLAN NOTE
Patient noted to go into atrial fibrillation the evening of 7/19/22 post cardiac catheterization with intervention.  This is a new diagnosis, although he carries risk factors for a fib.  Started on Eliquis 5 mg BID.  ECG at recent OV SB, rate 58 bpm.  14 day ZIO monitor 7/27-8/10/22 showed SVT with no recurrent a fib, avg HR 53 bpm. Charley noted during sleep.  Urged faithful CPAP use.  Metoprolol tartrate reduced to 50 mg BID at 1/2024 OV  24 hour Holter monitor 1/29/2024 shows no a fib with HR , avg 59 bpm. 11% PAC burden with no a fib detected.  Continue current medication other than stopping Vascepa as this increases risk of a fib.

## 2024-05-16 NOTE — ASSESSMENT & PLAN NOTE
Patient is currently on rosuvastatin 40 mg daily, Zetia 10 mg daily, Vascepa 1 G BID.  Goal LDL < 70.  Lipid panel 10/27/2023: C 132. T 195. H 42. L 51.  Given increasing risk of a fib with Vascepa will discontinue. Monitor TG on upcoming labs.

## 2024-05-16 NOTE — ASSESSMENT & PLAN NOTE
Lab Results   Component Value Date    EGFR 48 (L) 10/27/2023    EGFR 55 (L) 07/27/2023    EGFR 51 (L) 06/27/2023    CREATININE 1.57 (H) 10/27/2023    CREATININE 1.41 (H) 07/27/2023    CREATININE 1.49 (H) 06/27/2023     Stable on recent blood work.   5

## 2024-05-16 NOTE — PROGRESS NOTES
Cardiology Follow Up    Terry Kemmerling  1955  [id number]  [facility] [place] [address]  [address]  [facility] [address] [phone]    Tomasz presents for routine follow up of CAD, PAF, HTN, HLD.       1. Coronary artery disease involving native coronary artery of native heart without angina pectoris  Assessment & Plan:  Coronary Artery Disease:  A. Inferior MI 6/4/2004 with VF arrest.  B. PCI and stent to RCA 6/4/2004.  C. Cardiac catheterization 10/2005: LM ok. LAD 20-30% proximal stenosis. Dx 50% ostial. LCx 50% OM and PLB with 50%. RCA 40% ISR. No intervention. EF 60%.  D. Non ischemic Lexiscan stress test 8/12/2019. EF 54%.  E. Lexiscan stress test 6/23/2022 with inferior scar with large periscar ischemia.  F. Cardiac cath 7/19/22: 80% LAD(p-m) with significant iFR, 70% 1st OM, 99% RCA(p-m); shockwave/PCI/RO to LAD, PCI and RO x 2 to RCA.  - - - - - - - -  Echocardiogram 11/2023 with EF 60%.  No anginal complaints.  Continue Plavix 75 mg daily with Eliquis 5 mg BID. No aspirin to avoid triple therapy.  Continue metoprolol tartrate, rosuvastatin 40 mg daily.  Strongly urged smoking cessation.  Reviewed s/s to report.  Will monitor.  Orders:  -     clopidogrel (PLAVIX) 75 mg tablet; Take 1 tablet (75 mg total) by mouth daily  2. S/P angioplasty with stent  Assessment & Plan:  PCI and stent to RCA in 2004.  Shockwave/PCI/RO to mid LAD, PCI and RO x 2 to mid RCA on 7/19/2022.  Currently on Eliquis for PAF and Plavix 75 mg daily. Dr. Gomes opted against triple therapy with aspirin.  We reviewed the importance of faithful medication compliance. Will not stop these meds without discussion from our office.  3. Paroxysmal atrial fibrillation (HCC)  Assessment & Plan:  Patient noted to go into atrial fibrillation the evening of 7/19/22 post cardiac catheterization with intervention.  This is a new diagnosis, although he carries risk factors for a  fib.  Started on Eliquis 5 mg BID.  ECG at recent OV SB, rate 58 bpm.  14 day ZIO monitor 7/27-8/10/22 showed SVT with no recurrent a fib, avg HR 53 bpm. Charley noted during sleep.  Urged faithful CPAP use.  Metoprolol tartrate reduced to 50 mg BID at 1/2024 OV  24 hour Holter monitor 1/29/2024 shows no a fib with HR , avg 59 bpm. 11% PAC burden with no a fib detected.  Continue current medication other than stopping Vascepa as this increases risk of a fib.  Orders:  -     apixaban (ELIQUIS) 5 mg; Take 1 tablet (5 mg total) by mouth 2 (two) times a day  -     metoprolol tartrate (LOPRESSOR) 50 mg tablet; Take 1 tablet (50 mg total) by mouth 2 (two) times a day  4. Primary hypertension  Assessment & Plan:  Blood pressure is acceptable.  Benazepril was stopped in the hospital due to angioedema.  Currently on amlodipine 7.5 mg daily, doxazosin 1 mg daily, metoprolol tartrate 50 mg BID.  Also on torsemide to 40 mg daily.  Reviewed importance of CPAP compliance, 2 g sodium diet, smoking cessation, and weight control.  He will monitor BP at home and report readings > 140/90.  Orders:  -     amLODIPine (NORVASC) 2.5 mg tablet; Take 1 tablet (2.5 mg total) by mouth daily Take with 5 mg for total of 7.5 mg daily  -     amLODIPine (NORVASC) 5 mg tablet; Take 1 tablet (5 mg total) by mouth daily  -     doxazosin (CARDURA) 1 mg tablet; Take 1 tablet (1 mg total) by mouth daily  5. Mixed hyperlipidemia  Assessment & Plan:  Patient is currently on rosuvastatin 40 mg daily, Zetia 10 mg daily, Vascepa 1 G BID.  Goal LDL < 70.  Lipid panel 10/27/2023: C 132. T 195. H 42. L 51.  Given increasing risk of a fib with Vascepa will discontinue. Monitor TG on upcoming labs.  Orders:  -     ezetimibe (ZETIA) 10 mg tablet; Take 1 tablet (10 mg total) by mouth daily at bedtime  6. Obstructive sleep apnea syndrome  Assessment & Plan:  We reviewed the correlation between untreated sleep apnea and atrial fibrillation.  Urged faithful  CPAP use.  Referral to sleep medicine for additional treatment.  Orders:  -     Ambulatory Referral to Sleep Medicine; Future  7. Upper respiratory tract infection, unspecified type  Comments:  tessalon pearls per patient request  Orders:  -     benzonatate (TESSALON PERLES) 100 mg capsule; Take 1 capsule (100 mg total) by mouth 3 (three) times a day as needed for cough  8. Localized edema  Assessment & Plan:  Mild lower leg edema, likely related to amlodipine use.  BNP 88 on recent labs.  Echo 11/2/2023 with EF 60%, mild DD.  Continue use of  compression socks.  Continue torsemide 40 mg daily.  Will continue to monitor as this is presently improved  Orders:  -     torsemide (DEMADEX) 20 mg tablet; Take 2 tablets (40 mg total) by mouth daily  9. Hypokalemia  Comments:  renewed potassium supplementation  Orders:  -     potassium chloride (Klor-Con M20) 20 mEq tablet; Take 1 tablet (20 mEq total) by mouth daily  10. Stage 3a chronic kidney disease (HCC)  Assessment & Plan:  Lab Results   Component Value Date    EGFR 48 (L) 10/27/2023    EGFR 55 (L) 07/27/2023    EGFR 51 (L) 06/27/2023    CREATININE 1.57 (H) 10/27/2023    CREATININE 1.41 (H) 07/27/2023    CREATININE 1.49 (H) 06/27/2023     Stable on recent blood work.  11. Tobacco use  Assessment & Plan:  Continues to smoke since 2019.  We reviewed the cardiovascular and pulmonary risks associated with smoking.  Urged complete cessation.  Will monitor closely.     LYSSA Tolbert has a past medical history of CAD, paroxysmal atrial fibrillation on Eliquis, HTN, HLD, obesity, ongoing tobacco use. History of angioedema related to ACE inhibitor.     He sustained an MI with VF arrest 2004 at which time he underwent PCI of the right coronary artery.        He was admitted to [facility] 7/9/2020 with swelling of his face and tongue.  This occurred after eating dinner on 07/09/2020.  The patient was given Benadryl and steroids as well as epinephrine it was felt that the  patient likely had developed angioedema from ACE-inhibitor.      He is a long time patient of Dr. Boyd and re-established with her on 8/12/2020   He had undergone a nuclear stress test 08/11/2019 which showed ejection fraction of 54% and was in normal limits. He noted occasional lower extremity edema. Denied any chest pain, shortness of breath, lightheadedness or dizziness, palpitations.       He followed up 4/8/2021 with Dr. Boyd. He had broken his wrist 02/22/2021 and underwent surgical intervention 03/01/2021. Blood pressure had been intermittently labile.  He denied any chest pain.  Patient did present for blood pressure check 02/10/2021 as his blood pressure had been running high. His amlodipine was increased to 5 mg daily. Doxazosin 1 mg daily was added.     Tomasz followed up 5/6/2022. He had gained 22 pounds since his visit the prior year. He had changed jobs and was much less active. He had not been compliant with his CPAP. He continued to smoke, about 1/2 PPD. He denied chest pain but reported progressively worsening dyspnea on exertion along with leg swelling. Updated echocardiogram and nuclear stress testing were ordered.     Echocardiogram 6/23/22 showed preserved LVEF with no significant wall motion or valvular abnormalities. Lexiscan nuclear stress test 6/23/22 showed an inferior wall scar with significant tk-scar ischemia. Therefore he was referred to undergo cardiac catheterization with nephrology consultation prior given his renal function.     Tomasz underwent cardiac catheterization at [facility] on 7/19/2022 which showed 80% stenosis of the proximal to mid LAD, iFR was significant. 70% stenosis to 1st marginal. 99% stenosis of proximal to mid RCA. He underwent shockwave to the proximal to mid LAD lesion followed by PCI with RO. PCI and RO x 2 placed to proximal to mid RCA lesion. He received 230 mL of IV contrast/ 61.4 minutes of fluro time (excess fluoro time was reviewed with patient by   Mireya and he was counseled on observing for radiation burn). He did receive pre-hydration per ESTELLE protocol and aggressive fluid resuscitation 150 mL/hr overnight. Admitting creatinine/GFR 1.48/48, discharge creatinine/GFR 1.14/67. He required an IV nitro drip overnight due to significant hypertension. He did go into rapid atrial fibrillation following the intervention. He was started on Eliquis 5 mg BID and Plavix 75 mg daily (no aspirin as per Dr. Gomes).      He followed up 7/27/2022 at which time he reported complete resolution of his shortness of breath since coronary intervention. BP was much improved. He denied palpitations. No bleeding issues. He was using his CPAP faithfully. ECG showed sinus bradycardia. Chest examined with no evidence of radiation burn.     He followed up on 6/29/2023 at which time his weight was up nearly 10 pounds. He was experiencing dyspnea on exertion and edema. He admitted to dietary indiscretion. He denied chest pain or palpitations. He was not consistently using his CPAP. Reported faithful medication administration. He was smoking. Torsemide was increased to 40 mg daily.      At 10/12/2023 follow up we increased amlodipine to 7.5 mg daily to optimize BP. He stopped drinking electrolyte drinks. He continued to smoke. He was scheduled for a colonoscopy 10/24/2023. He was referred back to sleep medicine as he had never scheduled an appointment.    At 1/12/2024 appt he had gained some weight and continued with swelling. He denied breathing issues. He complained of fatigue. HR was in the 40-50's. We reduced his metoprolol tartrate to 50 mg BID.   BNP was normal at 88. LDL 51 and . Renal function and potassium are improved on recent labs. He states he never heard to schedule with sleep medicine.    5/16/2024: Tomasz presents for routine follow up. He notes significant improvement in his energy level with the reduced dose of metoprolol. He denies palpitations or heart racing. We  reviewed his Holter results. His wife has him on a diet and he is losing weight. He denies any chest pain, shortness of breath. States leg swelling is improved. He did develop a URI 3 weeks ago and was on antibiotics. He has a lingering cough. He continues to smoke. He is scheduled for follow up with PCP and labs in early July. Still has not heard to schedule with sleep medicine.    Medical Problems       Problem List       Esophagitis    H/O acute myocardial infarction    Coronary artery disease involving native coronary artery    S/P angioplasty with stent    Paroxysmal atrial fibrillation (formerly [facility])    HTN (hypertension)    Hyperlipidemia    Sleep apnea    Localized edema    Chronic kidney disease (CKD), stage III (moderate) (formerly [facility])    Dyspnea on exertion    Tobacco use    Impaired fasting glucose    Bradycardia        Past Medical History:   Diagnosis Date    Chronic kidney disease     Coronary artery disease     CPAP (continuous positive airway pressure) dependence     Intermittant    GERD (gastroesophageal reflux disease)     H/O heart artery stent     Heart attack (formerly [facility])     Hyperlipidemia     Hypertension     MI (myocardial infarction) (formerly [facility])     Myocardial infarction (formerly [facility])     2004 x 1 stent    LUCIO (obstructive sleep apnea)     Tobacco use      Social History     Socioeconomic History    Marital status: /Civil Union     Spouse name: Not on file    Number of children: Not on file    Years of education: Not on file    Highest education level: Not on file   Occupational History    Not on file   Tobacco Use    Smoking status: Former     Current packs/day: 0.00     Types: Cigarettes     Quit date: 2022     Years since quittin.8    Smokeless tobacco: Never    Tobacco comments:     Occ Cigs   Vaping Use    Vaping status: Never Used   Substance and Sexual Activity    Alcohol use: Not Currently    Drug use: Never    Sexual activity: Not on file     Comment: Defer   Other Topics Concern    Not on file   Social  History Narrative    Not on file     Social Determinants of Health     Financial Resource Strain: Low Risk  (9/13/2023)    [facility]    Overall Financial Resource Strain (CARDIA)     Difficulty of Paying Living Expenses: Not hard at all   Food Insecurity: No Food Insecurity (9/13/2023)    [facility]    Hunger Vital Sign     Worried About Running Out of Food in the Last Year: Never true     Ran Out of Food in the Last Year: Never true   Transportation Needs: No Transportation Needs (9/13/2023)    [facility]    PRAPARE - Transportation     Lack of Transportation (Medical): No     Lack of Transportation (Non-Medical): No   Physical Activity: Not on file   Stress: Not on file   Social Connections: Socially Integrated (6/22/2023)    [facility]    Social Connection and Isolation Panel [NHANES]     Frequency of Communication with Friends and Family: More than three times a week     Frequency of Social Gatherings with Friends and Family: Twice a week     Attends Judaism Services: More than 4 times per year     Active Member of Clubs or Organizations: Yes     Attends Club or Organization Meetings: More than 4 times per year     Marital Status:    Intimate Partner Violence: Not At Risk (6/22/2023)    Received from [facility], [facility]    Humiliation, Afraid, Rape, and Kick questionnaire     Fear of Current or Ex-Partner: No     Emotionally Abused: No     Physically Abused: No     Sexually Abused: No   Housing Stability: Unknown (9/13/2023)    Received from [facility]    Housing Stability Vital Sign     Unable to Pay for Housing in the Last Year: No     Number of Places Lived in the Last Year: Not on file     Unstable Housing in the Last Year: No      Family History   Problem Relation Age of  Onset    Hypertension Mother     Arthritis Mother     Hypertension Father     Hyperlipidemia Father     Hypertension Sister     Hyperlipidemia Sister     Hyperlipidemia Brother     Diabetes Paternal Grandmother      Past Surgical History:   Procedure Laterality Date    BACK SURGERY  2012    CARDIAC CATHETERIZATION  06/04/2004    PTCA and Cypher stent placement to the RCA(p).     CARDIAC CATHETERIZATION  09/16/2005    LAD(p) 20-30%. Dx 50% ostial. LCx 50%. OM and PLB 50%. RCA 40% ISR. No intervention. EF 60%.    CARDIAC CATHETERIZATION N/A 7/19/2022    Procedure: Cardiac Coronary Angiogram;  Surgeon: Daniel Gomes MD;  Location: [facility];  Service: Cardiology    CARDIAC CATHETERIZATION  7/19/2022    Procedure: Cardiac catheterization;  Surgeon: Daniel Gomes MD;  Location: [facility];  Service: Cardiology    CARDIAC CATHETERIZATION N/A 7/19/2022    Procedure: Cardiac pci;  Surgeon: Daniel Gomes MD;  Location:  CARDIAC CATH LAB;  Service: Cardiology    CARDIAC SURGERY      HERNIA REPAIR      IL OPTX DSTL RADL I-ARTIC FX/EPIPHYSL SEP 2 FRAG Right 3/1/2021    Procedure: OPEN REDUCTION W/ INTERNAL FIXATION (ORIF) DISTAL RADIUS;  Surgeon: Cyrus Fishman MD;  Location:  MAIN OR;  Service: Orthopedics    ROTATOR CUFF REPAIR         Current Outpatient Medications:     albuterol (PROVENTIL HFA,VENTOLIN HFA) 90 mcg/act inhaler, , Disp: , Rfl:     allopurinol (ZYLOPRIM) 300 mg tablet, TAKE (1) TABLET BY MOUTH ONCE DAILY., Disp: , Rfl:     amLODIPine (NORVASC) 2.5 mg tablet, Take 1 tablet (2.5 mg total) by mouth daily Take with 5 mg for total of 7.5 mg daily, Disp: 90 tablet, Rfl: 3    amLODIPine (NORVASC) 5 mg tablet, Take 1 tablet (5 mg total) by mouth daily, Disp: 90 tablet, Rfl: 3    apixaban (ELIQUIS) 5 mg, Take 1 tablet (5 mg total) by mouth 2 (two) times a day, Disp: 180 tablet, Rfl: 3    benzonatate (TESSALON PERLES) 100 mg capsule, Take 1 capsule (100 mg total) by mouth 3 (three) times a day as  needed for cough, Disp: 20 capsule, Rfl: 0    clopidogrel (PLAVIX) 75 mg tablet, Take 1 tablet (75 mg total) by mouth daily, Disp: 90 tablet, Rfl: 3    co-enzyme Q-10 100 mg capsule, Take 100 mg by mouth daily , Disp: , Rfl:     doxazosin (CARDURA) 1 mg tablet, Take 1 tablet (1 mg total) by mouth daily, Disp: 90 tablet, Rfl: 3    ezetimibe (ZETIA) 10 mg tablet, Take 1 tablet (10 mg total) by mouth daily at bedtime, Disp: 90 tablet, Rfl: 3    fluticasone (FLONASE) 50 mcg/act nasal spray, 2 sprays into each nostril every 12 (twelve) hours as needed (sinus infections) , Disp: , Rfl:     lansoprazole (PREVACID) 30 mg capsule, TAKE 1 CAPSULE BY MOUTH ONCE DAILY., Disp: , Rfl:     metoprolol tartrate (LOPRESSOR) 50 mg tablet, Take 1 tablet (50 mg total) by mouth 2 (two) times a day, Disp: 180 tablet, Rfl: 3    potassium chloride (Klor-Con M20) 20 mEq tablet, Take 1 tablet (20 mEq total) by mouth daily, Disp: 90 tablet, Rfl: 3    rosuvastatin (CRESTOR) 40 MG tablet, TAKE 1 TABLET BY MOUTH ONCE DAILY IN THE EVENING., Disp: , Rfl:     torsemide (DEMADEX) 20 mg tablet, Take 2 tablets (40 mg total) by mouth daily, Disp: 180 tablet, Rfl: 3    nitroglycerin (NITROSTAT) 0.4 mg SL tablet, Place 1 tablet (0.4 mg total) under the tongue every 5 (five) minutes as needed for chest pain (Patient not taking: Reported on 6/29/2023), Disp: 20 tablet, Rfl: 0  Allergies   Allergen Reactions    Ace Inhibitors Angioedema    Alprazolam Other (See Comments)     Other reaction(s): Other: Document details in comments  SEVERE DISOREINTATION/CRAZY  SEVERE DISOREINTATION/CRAZY      Benazepril Other (See Comments)    Buspirone      Other reaction(s): Other (See Comments), Other: Document details in comments  SEVERE DISORIENTATION/CRAZY  SEVERE DISORIENTATION/CRAZY      Lorazepam      Other reaction(s): Other (See Comments), Other: Document details in comments  SEVERE DISORIENTATION/CRAZY  SEVERE DISORIENTATION/CRAZY         Labs:     Chemistry         Component Value Date/Time    K 3.4 (L) 10/27/2023 0000    CL 98 (L) 10/27/2023 0000    CO2 29 10/27/2023 0000    BUN 16 10/27/2023 0000    CREATININE 1.57 (H) 10/27/2023 0000        Component Value Date/Time    CALCIUM 8.3 (L) 10/27/2023 0000    ALKPHOS 102 10/27/2023 0000    AST 19 10/27/2023 0000    ALT 13 10/27/2023 0000        24 hour Holter monitor 1/29/2024:  Predominantly sinus rhythm, HR , avg 59 bpm.  71 PVC's. 9483 PAC's(11.2%) with one 5-beat atrial run. No atrial fibrillation.  Longest R/R 2.4 seconds.    Echo 11/2/2023:  LVEF 60%, mild LVH. Grade 1 DD.  Mild fusion of left and noncoronary cusps of aortic valve.  Mild TR. PASP 34mmHg.     Lipid panel 10/27/2023: C 132. T 195. H 42. L 51.     ECG 6/29/2023: Sinus bradycardia with sinus arrythmia. Poor anterior R wave progression. Rate 58 bpm.     Lipid panel 6/27/2023: C 145. T 237. H 48. L 50.     ZIO 7/27-8/10/2022:  Predominantly sinus rhythm with slight P wave morphology changes noted.  HR , avg 53 bpm.  183 runs of SVT, longest 53.8 seconds with avg  bpm.  Wenckebach present during sleep.  1.1% PAC burden. Rare PVC's.     ECG 7/27/2022: Sinus bradycardia, inferior infarct, age undetermined. Rate 54 bpm.     Cardiac catheterization 7/19/2022:  Prox LAD to Mid LAD lesion is 80% stenosed. · 1st Mrg lesion is 70% stenosed. · Prox RCA to Mid RCA lesion is 99% stenosed.  Multivessel CAD. A long 80% stenosis of the proximal and mid LAD was interrogated by iFR and was flow-limiting (iFR=0.82).  Successful IVUS-guided PCI of proximal and mid LAD performed. After shockwave lithotripsy and pre-dilation, a 3.0x38 RO was placed under GuiderLiner support. There was no residual stenosis. IVUS-guided PCI of in-stent restenosis of the mid RCA was then performed. A 2.5x38mm RO was placed under GuideLiner support. Because of a possible edge dissection, a 2.5x12mm RO was overlapped at the distal margin of the first stent. There was no residual  stenosis. Disease of OM1 was not treated but could be addressed if symptoms recur.      Lexiscan nuclear stress test 6/23/2022:   Post-stress ejection fraction is 60 %. There is mild hypokinesis of the basal to mid inferior wall.  Perfusion: There is a medium-sized, moderate intensity perfusion defect in the basal to distal inferior wall, with partial reversibility in the distal inferior wall consistent with myocardial scar with significant tk-scar ischemia.     Echocardiogram 6/23/2022:  EF 60%. Trace MR. Trace TR.      ECG 5/6/2022: Sinus bradycardia with PAC's. Inferior infarct. Rate 51 bpm.     Lipid 4/1/2022: Triglycerides 268. Direct LDL 69.     Lipid Panel 2/9/2021: C 163. T 215. H 38. L 82.     Echocardiogram 9/18/2020:   EF 55-60%.  Upper normal wall thickness.  Mild diastolic dysfunction.  Mildly dilated left atrium.  Trace mitral regurgitation.  Trace to mild tricuspid regurgitation.  No significant change compared to prior study 05/11/2015.     Nuclear Lexiscan Stress Test 8/12/2019:   Normal study. EF 54%.     Echocardiogram(5/11/2015):  EF 55-60%.  Mild MR. Mild TR.     Holter Monitor - (7/6/2004) 27 VE beats. 1 idioventricular episode 2.4 seconds.  EKG - (4/15/2016) NSR.70 BPM. Inferior MI.  Nuclear Stress Test - (5/11/2015) Lexiscan. No scar. No ischemia. EF 57%.     Cardiac Procedures:     Cardiac Catheterization - (9/16/2005) LM ok. LAD 20-30% proximal stenosis. Dx 50% ostial. LCx 50% OM and PLB with 50%. RCA 40% ISR. No intervention. EF 60%.  Cardiac Catheterization - (6/4/2004) PTCA and Cypher stent placement to the Proximal RCA.  Percutaneous Coronary Intervention - (6/4/2004) Cypher stent to RCA.    Review of Systems   Constitutional: Negative.   HENT: Negative.     Cardiovascular:  Positive for leg swelling. Negative for chest pain, dyspnea on exertion, irregular heartbeat, near-syncope, orthopnea and palpitations.   Respiratory:  Positive for cough. Negative for snoring.    Endocrine:  "Negative.    Skin: Negative.    Musculoskeletal: Negative.    Gastrointestinal: Negative.    Genitourinary: Negative.    Neurological: Negative.    Psychiatric/Behavioral: Negative.         Vitals:    05/16/24 1353   BP: 136/82   Pulse: 88   Temp: 98.6 °F (37 °C)   SpO2: 99%     Vitals:    05/16/24 1353   Weight: (!) 148 kg (327 lb)     Height: 6' 3\" (190.5 cm)   Body mass index is 40.87 kg/m².    Physical Exam  Vitals and nursing note reviewed.   Constitutional:       General: He is not in acute distress.     Appearance: He is well-developed. He is obese. He is not diaphoretic.   HENT:      Head: Normocephalic and atraumatic.   Neck:      Vascular: No carotid bruit or JVD.   Cardiovascular:      Rate and Rhythm: Normal rate and regular rhythm.      Pulses: Intact distal pulses.      Heart sounds: Normal heart sounds, S1 normal and S2 normal. No murmur heard.     No friction rub. No gallop.      Comments: Mild bilateral lower leg edema  Pulmonary:      Effort: Pulmonary effort is normal. No respiratory distress.      Breath sounds: Normal breath sounds.   Abdominal:      General: There is no distension.      Palpations: Abdomen is soft.      Tenderness: There is no abdominal tenderness.   Skin:     General: Skin is warm and dry.      Findings: No rash.   Neurological:      Mental Status: He is alert and oriented to person, place, and time.   Psychiatric:         Mood and Affect: Mood and affect normal.         Behavior: Behavior normal.                "

## 2024-05-16 NOTE — ASSESSMENT & PLAN NOTE
PCI and stent to RCA in 2004.  Shockwave/PCI/RO to mid LAD, PCI and RO x 2 to mid RCA on 7/19/2022.  Currently on Eliquis for PAF and Plavix 75 mg daily. Dr. Gmoes opted against triple therapy with aspirin.  We reviewed the importance of faithful medication compliance. Will not stop these meds without discussion from our office.

## 2024-05-16 NOTE — ASSESSMENT & PLAN NOTE
Blood pressure is acceptable.  Benazepril was stopped in the hospital due to angioedema.  Currently on amlodipine 7.5 mg daily, doxazosin 1 mg daily, metoprolol tartrate 50 mg BID.  Also on torsemide to 40 mg daily.  Reviewed importance of CPAP compliance, 2 g sodium diet, smoking cessation, and weight control.  He will monitor BP at home and report readings > 140/90.

## 2024-05-16 NOTE — PATIENT INSTRUCTIONS
For tooth extraction - ok for 1-2 day Eliquis hold.  If the dentist wants you to hold Plavix, take aspirin 81 mg while holding the Plavix.   Stop Vascepa (triglyceride medication) as this is linked to higher episodes of a fib.  Your heart monitor showed frequent premature atrial contractions but no a fib.   Please have Dr. Nichols send us a copy when he draws your labs.

## 2024-08-18 NOTE — ED PROVIDER NOTES
History  Chief Complaint   Patient presents with    Allergic Reaction     pt  had left sided facial swelling and left sided tounge swelling, denies change in diet or bug bite, slight difficulty swallowing, pt  took 25 benadryl prio to EMS arrival,2113 epi given by BLS, 2125 given addition 25 benadryl IV, 2135 0 3 epi IM given prior to ER arrival, sligjht improvemtn to facial and tounge swelling per EMS, pt  denies pain, no acute resop distress noted, pt  still c/o diffiuclty swallowing, slurred speech with tounge swelling     42-year-old male presents to ED with aside facial swelling a trouble swallowing approximately 1 hour prior to arrival   Patient was given by EMS to rounds at the 0 3 mg IM prior to arrival       Allergic Reaction   Presenting symptoms: difficulty breathing, difficulty swallowing and swelling    Severity:  Moderate  Prior allergic episodes:  No prior episodes  Context: medications    Context: not animal exposure, not cosmetics, not food allergies, not new detergents/soaps, not nuts and not poison ivy    Relieved by:  None tried  Worsened by:  Nothing  Ineffective treatments:  None tried      Prior to Admission Medications   Prescriptions Last Dose Informant Patient Reported? Taking? Icosapent Ethyl 1 g CAPS 7/9/2020 at Unknown time  Yes Yes   Sig: Take 2 g by mouth 2 (two) times a day    allopurinol (ZYLOPRIM) 300 mg tablet 7/9/2020 at Unknown time  Yes Yes   Sig: TAKE (1) TABLET BY MOUTH ONCE DAILY  amLODIPine (NORVASC) 2 5 mg tablet 7/9/2020 at Unknown time  Yes Yes   Sig: TAKE (1) TABLET BY MOUTH DAILY IN THE MORNING   aspirin (ECOTRIN LOW STRENGTH) 81 mg EC tablet 7/9/2020 at Unknown time  Yes Yes   Sig: Take 81 mg by mouth daily after dinner Takes at 1800   benazepril (LOTENSIN) 20 mg tablet 7/9/2020 at Unknown time  Yes Yes   Sig: TAKE (1) TABLET BY MOUTH ONCE DAILY     co-enzyme Q-10 100 mg capsule 7/9/2020 at Unknown time  Yes Yes   Sig: Take 100 mg by mouth daily    ezetimibe Masslike opacity anterior to the abductor muscles on CT scan  I find nothing significant on physical exam  Abdominal ultrasound pending   (ZETIA) 10 mg tablet 2020  Yes No   Sig: Take 10 mg by mouth daily at bedtime    fenofibrate (TRIGLIDE) 160 MG tablet 2020  Yes No   Sig: daily at bedtime    fluticasone (FLONASE) 50 mcg/act nasal spray More than a month at Unknown time Spouse/Significant Other Yes No   Si sprays into each nostril every 12 (twelve) hours as needed (sinus infections)    lansoprazole (PREVACID) 30 mg capsule 2020 at Unknown time  Yes Yes   Sig: TAKE 1 CAPSULE BY MOUTH ONCE DAILY  metoprolol tartrate (LOPRESSOR) 100 mg tablet 2020 at Unknown time  Yes Yes   Sig: TAKE 1 TABLET BY MOUTH TWICE DAILY  rosuvastatin (CRESTOR) 40 MG tablet 2020 at Unknown time  Yes Yes   Sig: TAKE 1 TABLET BY MOUTH ONCE DAILY IN THE EVENING  torsemide (DEMADEX) 10 mg tablet 2020 at Unknown time  Yes Yes   Sig: TAKE (1) TABLET BY MOUTH ONCE DAILY  Facility-Administered Medications: None       Past Medical History:   Diagnosis Date    CAD (coronary artery disease)     Chronic kidney disease     CKD (chronic kidney disease), stage III (HCC)     Coronary artery disease     Esophagitis     H/O heart artery stent     Hyperlipidemia     Hypertension     [facility] infarction) ([facility] [address] )     LUCIO (obstructive sleep apnea)     Sleep apnea        Past Surgical History:   Procedure Laterality Date    CARDIAC CATHETERIZATION      CARDIAC SURGERY      HERNIA REPAIR      ROTATOR CUFF REPAIR         Family History   Problem Relation Age of Onset    Hypertension Mother    Jose Guadalupe Clothier Arthritis Mother     Hypertension Father     Hyperlipidemia Father     Hypertension Sister     Hyperlipidemia Sister     Hyperlipidemia Brother     Diabetes Paternal Grandmother      I have reviewed and agree with the history as documented      E-Cigarette/Vaping    E-Cigarette Use Never User      E-Cigarette/Vaping Substances    Nicotine No     THC No     CBD No     Flavoring No      Social History     Tobacco Use    Smoking status: Former Smoker     Packs/day: 0 50     Types: Cigarettes    Smokeless tobacco: Never Used   Substance Use Topics    Alcohol use: Yes     Frequency: Monthly or less    Drug use: Never       Review of Systems   HENT: Positive for drooling and trouble swallowing  Respiratory: Positive for shortness of breath  All other systems reviewed and are negative  Physical Exam  Physical Exam   Constitutional: He is oriented to person, place, and time  He appears distressed  HENT:   Head: Normocephalic and atraumatic  See test pressure is of age edema  Eyes: Pupils are equal, round, and reactive to light  EOM are normal    Neck: Normal range of motion  Neck supple  Cardiovascular: Regular rhythm and normal heart sounds  Tachycardic   Pulmonary/Chest: Effort normal and breath sounds normal  No stridor  No respiratory distress  Abdominal: Soft  Bowel sounds are normal  He exhibits no distension  There is no tenderness  Musculoskeletal: Normal range of motion  Neurological: He is alert and oriented to person, place, and time  Skin: Skin is warm and dry  Capillary refill takes less than 2 seconds  Psychiatric: He has a normal mood and affect  His behavior is normal    Nursing note and vitals reviewed        Vital Signs  ED Triage Vitals   Temperature Pulse Respirations Blood Pressure SpO2   07/09/20 2201 07/09/20 2201 07/09/20 2201 07/09/20 2201 07/09/20 2201   98 3 °F (36 8 °C) 90 16 156/71 94 %      Temp Source Heart Rate Source Patient Position - Orthostatic VS BP Location FiO2 (%)   07/09/20 2201 07/09/20 2201 07/09/20 2201 07/09/20 2201 --   Temporal Monitor Sitting Right arm       Pain Score       07/10/20 0000       No Pain           Vitals:    07/10/20 0300 07/10/20 0400 07/10/20 0500 07/10/20 0600   BP: 126/59 113/57     Pulse: 62 61 61 58   Patient Position - Orthostatic VS: Lying            Visual Acuity  Visual Acuity      Most Recent Value   L Pupil Size (mm)  3   R Pupil Size (mm)  3   L Pupil Shape  Round   R Pupil Shape  Round          ED Medications  Medications   heparin (porcine) subcutaneous injection 5,000 Units (5,000 Units Subcutaneous Given 7/10/20 0505)   insulin lispro (HumaLOG) 100 units/mL subcutaneous injection 1-5 Units (has no administration in time range)   pantoprazole (PROTONIX) EC tablet 40 mg (40 mg Oral Given 7/10/20 0505)   metoprolol tartrate (LOPRESSOR) tablet 100 mg (has no administration in time range)   atorvastatin (LIPITOR) tablet 80 mg (has no administration in time range)   ezetimibe (ZETIA) tablet 10 mg (10 mg Oral Not Given 7/10/20 0115)   aspirin (ECOTRIN LOW STRENGTH) EC tablet 81 mg (has no administration in time range)   amLODIPine (NORVASC) tablet 2 5 mg (has no administration in time range)   allopurinol (ZYLOPRIM) tablet 300 mg (has no administration in time range)   magnesium sulfate 2 g/50 mL IVPB (premix) 2 g (2 g Intravenous New Bag 7/10/20 0609)   lactated ringers bolus 1,000 mL (0 mL Intravenous Stopped 7/9/20 2319)   dexamethasone (PF) (DECADRON) injection 10 mg (10 mg Intravenous Given 7/9/20 2219)   famotidine (PEPCID) injection 40 mg (40 mg Intravenous Given 7/9/20 2219)   potassium chloride 20 mEq IVPB (premix) (0 mEq Intravenous Stopped 7/10/20 0439)   magnesium sulfate 2 g/50 mL IVPB (premix) 2 g (0 g Intravenous Stopped 7/10/20 0439)       Diagnostic Studies  Results Reviewed     Procedure Component Value Units Date/Time    Magnesium [[id number]]  (Abnormal) Collected:  07/09/20 2214    Lab Status:  Final result Specimen:  Blood from Arm, Left Updated:  07/10/20 0043     Magnesium 0 4 mg/dL     Troponin I [[id number]]  (Normal) Collected:  07/09/20 2214    Lab Status:  Final result Specimen:  Blood from Arm, Left Updated:  07/09/20 2238     Troponin I <0 02 ng/mL     Basic metabolic panel [[id number]]  (Abnormal) Collected:  07/09/20 2214    Lab Status:  Final result Specimen:  Blood from Arm, Left Updated:  07/09/20 2228     Sodium 137 mmol/L Potassium 2 9 mmol/L      Chloride 99 mmol/L      CO2 26 mmol/L      ANION GAP 12 mmol/L      BUN 26 mg/dL      Creatinine 2 88 mg/dL      Glucose 219 mg/dL      Calcium 7 4 mg/dL      eGFR 22 ml/min/1 73sq m     Narrative:       [place] guidelines for Chronic Kidney Disease (CKD):     Stage 1 with normal or high GFR (GFR > 90 mL/min/1 73 square meters)    Stage 2 Mild CKD (GFR = 60-89 mL/min/1 73 square meters)    Stage 3A Moderate CKD (GFR = 45-59 mL/min/1 73 square meters)    Stage 3B Moderate CKD (GFR = 30-44 mL/min/1 73 square meters)    Stage 4 Severe CKD (GFR = 15-29 mL/min/1 73 square meters)    Stage 5 End Stage CKD (GFR <15 mL/min/1 73 square meters)  Note: GFR calculation is accurate only with a steady state creatinine    CBC and differential [[id number]]  (Abnormal) Collected:  07/09/20 2214    Lab Status:  Final result Specimen:  Blood from Arm, Left Updated:  07/09/20 2218     WBC 13 85 Thousand/uL      RBC 3 91 Million/uL      Hemoglobin 12 2 g/dL      Hematocrit 35 5 %      MCV 91 fL      MCH 31 2 pg      MCHC 34 4 g/dL      RDW 13 9 %      MPV 9 8 fL      Platelets 412 Thousands/uL      nRBC 0 /100 WBCs      Neutrophils Relative 70 %      Immat GRANS % 0 %      Lymphocytes Relative 22 %      Monocytes Relative 5 %      Eosinophils Relative 3 %      Basophils Relative 0 %      Neutrophils Absolute 9 57 Thousands/µL      Immature Grans Absolute 0 06 Thousand/uL      Lymphocytes Absolute 2 99 Thousands/µL      Monocytes Absolute 0 74 Thousand/µL      Eosinophils Absolute 0 43 Thousand/µL      Basophils Absolute 0 06 Thousands/µL                  No orders to display              Procedures  Procedures         ED Course  ED Course as of Jul 10 0632   Thu Jul 09, 2020 2212 EKG:  Normal sinus rhythm at 86 beats per minute, normal WI, normal QRS, normal QTC, ST T-wave depressions/changes noted in anterior lateral leads  No previous EKG available        2213 Patient is currently chest pain-free      2315 Case discussed with Dr Job Kohler text, concern for patient's persistent angio edema  No resolution currently, will admit  2315 Patient's hypokalemia with renal dysfunction, will hold on replacement  2317 Patient on ACE-inhibitor, discuss stopping ACE-inhibitor  Likely the culprit of angioedema  US AUDIT      Most Recent Value   Initial Alcohol Screen: US AUDIT-C    1  How often do you have a drink containing alcohol? 1 Filed at: 07/09/2020 2204   2  How many drinks containing alcohol do you have on a typical day you are drinking? 0 Filed at: 07/09/2020 2204   Audit-C Score  1 Filed at: 07/09/2020 2204                  PADMINI/DAST-10      Most Recent Value   How many times in the past year have you    Used an illegal drug or used a prescription medication for non-medical reasons? Never Filed at: 07/09/2020 2204                                [facility]  Number of Diagnoses or Management Options  Angioedema, initial encounter:   Diagnosis management comments: Not limited to injury angioedema secondary to ACE inhibitor use, allergic reaction, less likely Romain's angina,       Amount and/or Complexity of Data Reviewed  Clinical lab tests: ordered and reviewed  Discuss the patient with other providers: yes    Patient Progress  Patient progress: improved        Disposition  Final diagnoses:   Angioedema, initial encounter     Time reflects when diagnosis was documented in both MDM as applicable and the Disposition within this note     Time User Action Codes Description Comment    7/9/2020 11:16 PM Cayla Lind [place]  3XXA] Angioedema, initial encounter     7/10/2020  5:34 AM Max Flores [place] [facility] [I10] Essential hypertension       ED Disposition     ED Disposition Condition Date/Time Comment    Admit Stable u Jul 9, 2020 11:16 PM Case was discussed with Olena Shelton and the patient's admission status was agreed to be Admission Status: inpatient status to the service of Dr Tapia Grazyna   Follow-up Information    None         Current Discharge Medication List      CONTINUE these medications which have NOT CHANGED    Details   allopurinol (ZYLOPRIM) 300 mg tablet TAKE (1) TABLET BY MOUTH ONCE DAILY  amLODIPine (NORVASC) 2 5 mg tablet TAKE (1) TABLET BY MOUTH DAILY IN THE MORNING      aspirin (ECOTRIN LOW STRENGTH) 81 mg EC tablet Take 81 mg by mouth daily after dinner Takes at 1800      benazepril (LOTENSIN) 20 mg tablet TAKE (1) TABLET BY MOUTH ONCE DAILY  co-enzyme Q-10 100 mg capsule Take 100 mg by mouth daily       Icosapent Ethyl 1 g CAPS Take 2 g by mouth 2 (two) times a day       lansoprazole (PREVACID) 30 mg capsule TAKE 1 CAPSULE BY MOUTH ONCE DAILY  metoprolol tartrate (LOPRESSOR) 100 mg tablet TAKE 1 TABLET BY MOUTH TWICE DAILY  rosuvastatin (CRESTOR) 40 MG tablet TAKE 1 TABLET BY MOUTH ONCE DAILY IN THE EVENING  torsemide (DEMADEX) 10 mg tablet TAKE (1) TABLET BY MOUTH ONCE DAILY  ezetimibe (ZETIA) 10 mg tablet Take 10 mg by mouth daily at bedtime       fenofibrate (TRIGLIDE) 160 MG tablet daily at bedtime       fluticasone (FLONASE) 50 mcg/act nasal spray 2 sprays into each nostril every 12 (twelve) hours as needed (sinus infections)            No discharge procedures on file      PDMP Review     None          ED Provider  Electronically Signed by           Dylan Blevins DO  07/10/20 0340

## 2024-08-19 NOTE — ASSESSMENT & PLAN NOTE
Patient is currently on rosuvastatin 40 mg daily, Zetia 10 mg daily  Goal LDL < 70  Lipid panel 4/1/2022: T 268  Direct LDL 69  Was on fenofibrate in the past which may need to be resumed if triglycerides remain elevated  Will continue to monitor  Imaging Studies

## 2024-09-24 ENCOUNTER — TELEPHONE (OUTPATIENT)
Age: 69
End: 2024-09-24

## 2024-09-24 ENCOUNTER — OFFICE VISIT (OUTPATIENT)
Dept: CARDIOLOGY CLINIC | Facility: CLINIC | Age: 69
End: 2024-09-24
Payer: COMMERCIAL

## 2024-09-24 VITALS
DIASTOLIC BLOOD PRESSURE: 86 MMHG | WEIGHT: 315 LBS | TEMPERATURE: 98.1 F | OXYGEN SATURATION: 96 % | HEIGHT: 75 IN | HEART RATE: 77 BPM | BODY MASS INDEX: 39.17 KG/M2 | SYSTOLIC BLOOD PRESSURE: 130 MMHG

## 2024-09-24 DIAGNOSIS — I25.10 CORONARY ARTERY DISEASE INVOLVING NATIVE CORONARY ARTERY OF NATIVE HEART WITHOUT ANGINA PECTORIS: Primary | ICD-10-CM

## 2024-09-24 DIAGNOSIS — G47.33 OBSTRUCTIVE SLEEP APNEA SYNDROME: ICD-10-CM

## 2024-09-24 DIAGNOSIS — N18.31 STAGE 3A CHRONIC KIDNEY DISEASE (HCC): ICD-10-CM

## 2024-09-24 DIAGNOSIS — Z72.0 TOBACCO USE: ICD-10-CM

## 2024-09-24 DIAGNOSIS — E78.2 MIXED HYPERLIPIDEMIA: ICD-10-CM

## 2024-09-24 DIAGNOSIS — R06.09 DYSPNEA ON EXERTION: ICD-10-CM

## 2024-09-24 DIAGNOSIS — I10 PRIMARY HYPERTENSION: ICD-10-CM

## 2024-09-24 DIAGNOSIS — I48.0 PAROXYSMAL ATRIAL FIBRILLATION (HCC): ICD-10-CM

## 2024-09-24 PROCEDURE — 99214 OFFICE O/P EST MOD 30 MIN: CPT | Performed by: NURSE PRACTITIONER

## 2024-09-24 RX ORDER — ALBUTEROL SULFATE 90 UG/1
2 INHALANT RESPIRATORY (INHALATION) EVERY 4 HOURS PRN
Qty: 18 G | Refills: 3 | Status: SHIPPED | OUTPATIENT
Start: 2024-09-24

## 2024-09-24 RX ORDER — AMOXICILLIN 500 MG/1
CAPSULE ORAL
COMMUNITY
Start: 2024-07-08

## 2024-09-24 NOTE — TELEPHONE ENCOUNTER
PA for SUBMITTED albuterol (PROVENTIL HFA,VENTOLIN HFA) 90 mcg/act inhaler         [][facility]-KEY:   [x]Анна-Case [id number]   []Faxed to plan   []Other website   []Phone call Case ID #     Office notes sent, clinical questions answered. Awaiting determination    Turnaround time for your insurance to make a decision on your Prior Authorization can take 7-21 business days.

## 2024-09-24 NOTE — ASSESSMENT & PLAN NOTE
Blood pressure is acceptable.  Benazepril was stopped in the hospital due to angioedema.  Currently on amlodipine 7.5 mg daily, doxazosin 1 mg daily, metoprolol tartrate 50 mg BID.  Also on torsemide to 40 mg daily.  Reviewed importance of CPAP compliance, 2 g sodium diet, smoking cessation, and weight control.  He will monitor BP at home and report readings > 140/90.  BMP 1/2025

## 2024-09-24 NOTE — PATIENT INSTRUCTIONS
Reduce alcohol  to less than 1 per day. Add folic acid supplementation.  Consider neurology evaluation for new tremor.  Hold rosuvastatin for 2 weeks then restart. Update me after the 2 weeks to let me know if you notice a difference. If so, we will order the Repatha injections.

## 2024-09-24 NOTE — ASSESSMENT & PLAN NOTE
Lab Results   Component Value Date    EGFR 55 (L) 07/01/2024    EGFR 48 (L) 10/27/2023    EGFR 55 (L) 07/27/2023    CREATININE 1.40 (H) 07/01/2024    CREATININE 1.57 (H) 10/27/2023    CREATININE 1.41 (H) 07/27/2023     Stable on recent blood work. Reviewed importance of avoiding nephrotoxic agents.  BMP ordered for 1/2025

## 2024-09-24 NOTE — ASSESSMENT & PLAN NOTE
Patient is currently on rosuvastatin 40 mg daily, Zetia 10 mg daily.  Goal LDL < 70.  Lipid panel 10/27/2023: C 132. T 195. H 42. L 51.  Direct LDL 70 on 7/1/2024 labs.  Currently reports myalgias - trial 2 week statin holiday and assess response.  Consider Repatha 140 mg every 14 days.

## 2024-09-24 NOTE — PROGRESS NOTES
Cardiology Follow Up    Terry Kemmerling  1955  [id number]  [facility] [place] [address] [address]  [address]  [facility] [address] [phone]    Tomasz presents for routine follow up of CAD, PAF, HTN, HLD.       1. Coronary artery disease involving native coronary artery of native heart without angina pectoris  Assessment & Plan:  Coronary Artery Disease:  A. Inferior MI 6/4/2004 with VF arrest.  B. PCI and stent to RCA 6/4/2004.  C. Cardiac catheterization 10/2005: LM ok. LAD 20-30% proximal stenosis. Dx 50% ostial. LCx 50% OM and PLB with 50%. RCA 40% ISR. No intervention. EF 60%.  D. Non ischemic Lexiscan stress test 8/12/2019. EF 54%.  E. Lexiscan stress test 6/23/2022 with inferior scar with large periscar ischemia.  F. Cardiac cath 7/19/22: 80% LAD(p-m) with significant iFR, 70% 1st OM, 99% RCA(p-m); shockwave/PCI/RO to LAD, PCI and RO x 2 to RCA.  - - - - - - - -  Echocardiogram 11/2023 with EF 60%.  No anginal complaints.  Continue Plavix 75 mg daily with Eliquis 5 mg BID. No aspirin to avoid triple therapy.  Continue metoprolol tartrate, rosuvastatin 40 mg daily.  Strongly urged smoking cessation.  Reviewed s/s to report.  Will monitor.  2. Paroxysmal atrial fibrillation (HCC)  Assessment & Plan:  Patient noted to go into atrial fibrillation the evening of 7/19/22 post cardiac catheterization with intervention.  This is a new diagnosis, although he carries risk factors for a fib.  Started on Eliquis 5 mg BID.  ECG at recent OV SB, rate 58 bpm.  14 day ZIO monitor 7/27-8/10/22 showed SVT with no recurrent a fib, avg HR 53 bpm. Charley noted during sleep.  Urged faithful CPAP use.  Metoprolol tartrate reduced to 50 mg BID at 1/2024 OV  24 hour Holter monitor 1/29/2024 shows no a fib with HR , avg 59 bpm. 11% PAC burden with no a fib detected.  Continue current medication other than stopping Vascepa as this increases risk of a fib.  3.  Primary hypertension  Assessment & Plan:  Blood pressure is acceptable.  Benazepril was stopped in the hospital due to angioedema.  Currently on amlodipine 7.5 mg daily, doxazosin 1 mg daily, metoprolol tartrate 50 mg BID.  Also on torsemide to 40 mg daily.  Reviewed importance of CPAP compliance, 2 g sodium diet, smoking cessation, and weight control.  He will monitor BP at home and report readings > 140/90.  BMP 1/2025  4. Mixed hyperlipidemia  Assessment & Plan:  Patient is currently on rosuvastatin 40 mg daily, Zetia 10 mg daily.  Goal LDL < 70.  Lipid panel 10/27/2023: C 132. T 195. H 42. L 51.  Direct LDL 70 on 7/1/2024 labs.  Currently reports myalgias - trial 2 week statin holiday and assess response.  Consider Repatha 140 mg every 14 days.   5. Dyspnea on exertion  Assessment & Plan:  Stable.  Renewed albuterol.  Urged complete smoking cessation  Orders:  -     albuterol (PROVENTIL HFA,VENTOLIN HFA) 90 mcg/act inhaler; Inhale 2 puffs every 4 (four) hours as needed for wheezing  6. Obstructive sleep apnea syndrome  Assessment & Plan:  We reviewed the correlation between untreated sleep apnea and atrial fibrillation.  Urged faithful CPAP use.  Referral to sleep medicine for additional treatment.  7. Stage 3a chronic kidney disease (HCC)  Assessment & Plan:  Lab Results   Component Value Date    EGFR 55 (L) 07/01/2024    EGFR 48 (L) 10/27/2023    EGFR 55 (L) 07/27/2023    CREATININE 1.40 (H) 07/01/2024    CREATININE 1.57 (H) 10/27/2023    CREATININE 1.41 (H) 07/27/2023     Stable on recent blood work. Reviewed importance of avoiding nephrotoxic agents.  BMP ordered for 1/2025  Orders:  -     Basic metabolic panel; Future; Expected date: 12/24/2024  8. Tobacco use  Assessment & Plan:  Continues to smoke since 2019.  We reviewed the cardiovascular and pulmonary risks associated with smoking.  Urged complete cessation.  Will monitor closely.       LYSSA Tolbert has a past medical history of CAD, paroxysmal atrial  fibrillation on Eliquis, HTN, HLD, obesity, ongoing tobacco use. History of angioedema related to ACE inhibitor.     He sustained an MI with VF arrest 2004 at which time he underwent PCI of the right coronary artery.        He was admitted to [facility] 7/9/2020 with swelling of his face and tongue.  This occurred after eating dinner on 07/09/2020.  The patient was given Benadryl and steroids as well as epinephrine it was felt that the patient likely had developed angioedema from ACE-inhibitor.      He is a long time patient of Dr. Boyd and re-established with her on 8/12/2020   He had undergone a nuclear stress test 08/11/2019 which showed ejection fraction of 54% and was in normal limits. He noted occasional lower extremity edema. Denied any chest pain, shortness of breath, lightheadedness or dizziness, palpitations.       He followed up 4/8/2021 with Dr. Boyd. He had broken his wrist 02/22/2021 and underwent surgical intervention 03/01/2021. Blood pressure had been intermittently labile.  He denied any chest pain.  Patient did present for blood pressure check 02/10/2021 as his blood pressure had been running high. His amlodipine was increased to 5 mg daily. Doxazosin 1 mg daily was added.     Tomasz followed up 5/6/2022. He had gained 22 pounds since his visit the prior year. He had changed jobs and was much less active. He had not been compliant with his CPAP. He continued to smoke, about 1/2 PPD. He denied chest pain but reported progressively worsening dyspnea on exertion along with leg swelling. Updated echocardiogram and nuclear stress testing were ordered.     Echocardiogram 6/23/22 showed preserved LVEF with no significant wall motion or valvular abnormalities. Lexiscan nuclear stress test 6/23/22 showed an inferior wall scar with significant tk-scar ischemia. Therefore he was referred to undergo cardiac catheterization with nephrology consultation prior given his renal function.      Tomasz underwent cardiac catheterization at [facility] on 7/19/2022 which showed 80% stenosis of the proximal to mid LAD, iFR was significant. 70% stenosis to 1st marginal. 99% stenosis of proximal to mid RCA. He underwent shockwave to the proximal to mid LAD lesion followed by PCI with RO. PCI and RO x 2 placed to proximal to mid RCA lesion. He received 230 mL of IV contrast/ 61.4 minutes of fluro time (excess fluoro time was reviewed with patient by Dr. Gomes and he was counseled on observing for radiation burn). He did receive pre-hydration per ESTELLE protocol and aggressive fluid resuscitation 150 mL/hr overnight. Admitting creatinine/GFR 1.48/48, discharge creatinine/GFR 1.14/67. He required an IV nitro drip overnight due to significant hypertension. He did go into rapid atrial fibrillation following the intervention. He was started on Eliquis 5 mg BID and Plavix 75 mg daily (no aspirin as per Dr. Gomes).      He followed up 7/27/2022 at which time he reported complete resolution of his shortness of breath since coronary intervention. BP was much improved. He denied palpitations. No bleeding issues. He was using his CPAP faithfully. ECG showed sinus bradycardia. Chest examined with no evidence of radiation burn.     He followed up on 6/29/2023 at which time his weight was up nearly 10 pounds. He was experiencing dyspnea on exertion and edema. He admitted to dietary indiscretion. He denied chest pain or palpitations. He was not consistently using his CPAP. Reported faithful medication administration. He was smoking. Torsemide was increased to 40 mg daily.      At 10/12/2023 follow up we increased amlodipine to 7.5 mg daily to optimize BP. He stopped drinking electrolyte drinks. He continued to smoke. He was scheduled for a colonoscopy 10/24/2023. He was referred back to sleep medicine as he had never scheduled an appointment.    He followed up most recently on 5/16/2024. He noted improvement in fatigue with reduced  dose of metoprolol.      2024: Tomasz presents for routine follow up. He is doing very well. He has been eating better and losing weight. He reports improvement in his swelling. No chest pain, shortness of breath. Dyspnea on exertion is improved. He uses his albuterol inhaler occasionally with improvement in breathing. He asks for a refill today. No palpitations or unusual fatigue. No lightheadedness. No bleeding issues. He is taking his medications as prescribed. He does continue to smoke. He completed labs  for his PCP. Ha1c 6.2 and LDL 70. He reports generalized muscle aches and questions if this could be from his statin.     Medical Problems       Problem List       Esophagitis    H/O acute myocardial infarction    Coronary artery disease involving native coronary artery    S/P angioplasty with stent    Paroxysmal atrial fibrillation ([facility])    HTN (hypertension)    Hyperlipidemia    Sleep apnea    Localized edema    Chronic kidney disease (CKD), stage III (moderate) ([facility])    Dyspnea on exertion    Tobacco use    Impaired fasting glucose    Bradycardia        Past Medical History:   Diagnosis Date    Chronic kidney disease     Coronary artery disease     CPAP (continuous positive airway pressure) dependence     Intermittant    GERD (gastroesophageal reflux disease)     H/O heart artery stent     Heart attack ([facility])     Hyperlipidemia     Hypertension     MI (myocardial infarction) ([facility])     Myocardial infarction ([facility])     2004 x 1 stent    LUCIO (obstructive sleep apnea)     Tobacco use      Social History     Socioeconomic History    Marital status: /Civil Union     Spouse name: Not on file    Number of children: Not on file    Years of education: Not on file    Highest education level: Not on file   Occupational History    Not on file   Tobacco Use    Smoking status: Former     Current packs/day: 0.00     Types: Cigarettes     Quit date: 2022     Years since quittin.2    Smokeless tobacco:  Never    Tobacco comments:     Occ Cigs   Vaping Use    Vaping status: Never Used   Substance and Sexual Activity    Alcohol use: Not Currently    Drug use: Never    Sexual activity: Not on file     Comment: Defer   Other Topics Concern    Not on file   Social History Narrative    Not on file     Social Determinants of Health     Financial Resource Strain: Low Risk  (7/1/2024)    Received from [facility]    Overall Financial Resource Strain (CARDIA)     Difficulty of Paying Living Expenses: Not hard at all   Food Insecurity: No Food Insecurity (7/1/2024)    Received from [facility]    Hunger Vital Sign     Worried About Running Out of Food in the Last Year: Never true     Ran Out of Food in the Last Year: Never true   Transportation Needs: No Transportation Needs (7/1/2024)    Received from [facility]    PRAPARE - Transportation     Lack of Transportation (Medical): No     Lack of Transportation (Non-Medical): No   Physical Activity: Not on file   Stress: No Stress Concern Present (7/1/2024)    Received from [facility]    [employer] - Occupational Stress Questionnaire     Feeling of Stress : Not at all   Social Connections: Feeling Socially Integrated (7/1/2024)    Received from [facility]    OASIS : Social Isolation     How often do you feel lonely or isolated from those around you?: Never   Intimate Partner Violence: Not At Risk (7/1/2024)    Received from [facility]    Humiliation, Afraid, Rape, and Kick questionnaire     Fear of Current or Ex-Partner: No     Emotionally Abused: No     Physically Abused: No     Sexually Abused: No   Housing Stability: Unknown (7/1/2024)    Received from [facility]    Housing Stability Vital Sign     Unable to Pay for Housing in the Last Year: No     Number of Times Moved in the Last Year: Not on file     Homeless in the  Last Year: No      Family History   Problem Relation Age of Onset    Hypertension Mother     Arthritis Mother     Hypertension Father     Hyperlipidemia Father     Hypertension Sister     Hyperlipidemia Sister     Hyperlipidemia Brother     Diabetes Paternal Grandmother      Past Surgical History:   Procedure Laterality Date    BACK SURGERY  2012    CARDIAC CATHETERIZATION  06/04/2004    PTCA and Cypher stent placement to the RCA(p).     CARDIAC CATHETERIZATION  09/16/2005    LAD(p) 20-30%. Dx 50% ostial. LCx 50%. OM and PLB 50%. RCA 40% ISR. No intervention. EF 60%.    CARDIAC CATHETERIZATION N/A 7/19/2022    Procedure: Cardiac Coronary Angiogram;  Surgeon: Daniel Gomes MD;  Location: [facility];  Service: Cardiology    CARDIAC CATHETERIZATION  7/19/2022    Procedure: Cardiac catheterization;  Surgeon: Daniel Gomes MD;  Location: [facility];  Service: Cardiology    CARDIAC CATHETERIZATION N/A 7/19/2022    Procedure: Cardiac pci;  Surgeon: Daniel Gomes MD;  Location: [facility];  Service: Cardiology    CARDIAC SURGERY      HERNIA REPAIR      WI OPTX DSTL RADL I-ARTIC FX/EPIPHYSL SEP 2 FRAG Right 3/1/2021    Procedure: OPEN REDUCTION W/ INTERNAL FIXATION (ORIF) DISTAL RADIUS;  Surgeon: Cyrus Fishman MD;  Location: [facility] OR;  Service: Orthopedics    ROTATOR CUFF REPAIR         Current Outpatient Medications:     albuterol (PROVENTIL HFA,VENTOLIN HFA) 90 mcg/act inhaler, Inhale 2 puffs every 4 (four) hours as needed for wheezing, Disp: 18 g, Rfl: 3    allopurinol (ZYLOPRIM) 300 mg tablet, TAKE (1) TABLET BY MOUTH ONCE DAILY., Disp: , Rfl:     amLODIPine (NORVASC) 2.5 mg tablet, Take 1 tablet (2.5 mg total) by mouth daily Take with 5 mg for total of 7.5 mg daily, Disp: 90 tablet, Rfl: 3    amLODIPine (NORVASC) 5 mg tablet, Take 1 tablet (5 mg total) by mouth daily, Disp: 90 tablet, Rfl: 3    amoxicillin (AMOXIL) 500 mg capsule, , Disp: , Rfl:     apixaban (ELIQUIS) 5 mg, Take 1 tablet (5  mg total) by mouth 2 (two) times a day, Disp: 180 tablet, Rfl: 3    clopidogrel (PLAVIX) 75 mg tablet, Take 1 tablet (75 mg total) by mouth daily, Disp: 90 tablet, Rfl: 3    co-enzyme Q-10 100 mg capsule, Take 100 mg by mouth daily , Disp: , Rfl:     doxazosin (CARDURA) 1 mg tablet, Take 1 tablet (1 mg total) by mouth daily, Disp: 90 tablet, Rfl: 3    ezetimibe (ZETIA) 10 mg tablet, Take 1 tablet (10 mg total) by mouth daily at bedtime, Disp: 90 tablet, Rfl: 3    fluticasone (FLONASE) 50 mcg/act nasal spray, 2 sprays into each nostril every 12 (twelve) hours as needed (sinus infections) , Disp: , Rfl:     lansoprazole (PREVACID) 30 mg capsule, TAKE 1 CAPSULE BY MOUTH ONCE DAILY., Disp: , Rfl:     metoprolol tartrate (LOPRESSOR) 50 mg tablet, Take 1 tablet (50 mg total) by mouth 2 (two) times a day, Disp: 180 tablet, Rfl: 3    potassium chloride (Klor-Con M20) 20 mEq tablet, Take 1 tablet (20 mEq total) by mouth daily, Disp: 90 tablet, Rfl: 3    rosuvastatin (CRESTOR) 40 MG tablet, TAKE 1 TABLET BY MOUTH ONCE DAILY IN THE EVENING., Disp: , Rfl:     torsemide (DEMADEX) 20 mg tablet, Take 2 tablets (40 mg total) by mouth daily, Disp: 180 tablet, Rfl: 3    nitroglycerin (NITROSTAT) 0.4 mg SL tablet, Place 1 tablet (0.4 mg total) under the tongue every 5 (five) minutes as needed for chest pain (Patient not taking: Reported on 6/29/2023), Disp: 20 tablet, Rfl: 0  Allergies   Allergen Reactions    Ace Inhibitors Angioedema    Alprazolam Other (See Comments)     Other reaction(s): Other: Document details in comments  SEVERE DISOREINTATION/CRAZY  SEVERE DISOREINTATION/CRAZY      Benazepril Other (See Comments)    Buspirone      Other reaction(s): Other (See Comments), Other: Document details in comments  SEVERE DISORIENTATION/CRAZY  SEVERE DISORIENTATION/CRAZY      Lorazepam      Other reaction(s): Other (See Comments), Other: Document details in comments  SEVERE DISORIENTATION/CRAZY  SEVERE DISORIENTATION/CRAZY          Labs:     Chemistry        Component Value Date/Time    K 3.6 07/01/2024 0921    CL 99 (L) 07/01/2024 0921    CO2 30 07/01/2024 0921    BUN 19 07/01/2024 0921    CREATININE 1.40 (H) 07/01/2024 0921        Component Value Date/Time    CALCIUM 9.1 07/01/2024 0921    ALKPHOS 102 10/27/2023 0000    AST 19 10/27/2023 0000    ALT 19 07/01/2024 0921        Lipid panel 7/1/2024: Direct LDL 70    24 hour Holter monitor 1/29/2024:  Predominantly sinus rhythm, HR , avg 59 bpm.  71 PVC's. 9483 PAC's(11.2%) with one 5-beat atrial run. No atrial fibrillation.  Longest R/R 2.4 seconds.     Echo 11/2/2023:  LVEF 60%, mild LVH. Grade 1 DD.  Mild fusion of left and noncoronary cusps of aortic valve.  Mild TR. PASP 34mmHg.     Lipid panel 10/27/2023: C 132. T 195. H 42. L 51.     ECG 6/29/2023: Sinus bradycardia with sinus arrythmia. Poor anterior R wave progression. Rate 58 bpm.     Lipid panel 6/27/2023: C 145. T 237. H 48. L 50.     ZIO 7/27-8/10/2022:  Predominantly sinus rhythm with slight P wave morphology changes noted.  HR , avg 53 bpm.  183 runs of SVT, longest 53.8 seconds with avg  bpm.  Wenckebach present during sleep.  1.1% PAC burden. Rare PVC's.     ECG 7/27/2022: Sinus bradycardia, inferior infarct, age undetermined. Rate 54 bpm.     Cardiac catheterization 7/19/2022:  Prox LAD to Mid LAD lesion is 80% stenosed. · 1st Mrg lesion is 70% stenosed. · Prox RCA to Mid RCA lesion is 99% stenosed.  Multivessel CAD. A long 80% stenosis of the proximal and mid LAD was interrogated by iFR and was flow-limiting (iFR=0.82).  Successful IVUS-guided PCI of proximal and mid LAD performed. After shockwave lithotripsy and pre-dilation, a 3.0x38 RO was placed under GuiderLiner support. There was no residual stenosis. IVUS-guided PCI of in-stent restenosis of the mid RCA was then performed. A 2.5x38mm RO was placed under GuideLiner support. Because of a possible edge dissection, a 2.5x12mm RO was overlapped at  the distal margin of the first stent. There was no residual stenosis. Disease of OM1 was not treated but could be addressed if symptoms recur.      Lexiscan nuclear stress test 6/23/2022:   Post-stress ejection fraction is 60 %. There is mild hypokinesis of the basal to mid inferior wall.  Perfusion: There is a medium-sized, moderate intensity perfusion defect in the basal to distal inferior wall, with partial reversibility in the distal inferior wall consistent with myocardial scar with significant tk-scar ischemia.     Echocardiogram 6/23/2022:  EF 60%. Trace MR. Trace TR.      ECG 5/6/2022: Sinus bradycardia with PAC's. Inferior infarct. Rate 51 bpm.     Lipid 4/1/2022: Triglycerides 268. Direct LDL 69.     Lipid Panel 2/9/2021: C 163. T 215. H 38. L 82.     Echocardiogram 9/18/2020:   EF 55-60%.  Upper normal wall thickness.  Mild diastolic dysfunction.  Mildly dilated left atrium.  Trace mitral regurgitation.  Trace to mild tricuspid regurgitation.  No significant change compared to prior study 05/11/2015.     Nuclear Lexiscan Stress Test 8/12/2019:   Normal study. EF 54%.     Echocardiogram(5/11/2015):  EF 55-60%.  Mild MR. Mild TR.     Holter Monitor - (7/6/2004) 27 VE beats. 1 idioventricular episode 2.4 seconds.  EKG - (4/15/2016) NSR.70 BPM. Inferior MI.  Nuclear Stress Test - (5/11/2015) Lexiscan. No scar. No ischemia. EF 57%.     Cardiac Procedures:     Cardiac Catheterization - (9/16/2005) LM ok. LAD 20-30% proximal stenosis. Dx 50% ostial. LCx 50% OM and PLB with 50%. RCA 40% ISR. No intervention. EF 60%.  Cardiac Catheterization - (6/4/2004) PTCA and Cypher stent placement to the Proximal RCA.  Percutaneous Coronary Intervention - (6/4/2004) Cypher stent to RCA.    Review of Systems   Constitutional: Negative.   HENT: Negative.     Cardiovascular:  Positive for dyspnea on exertion and leg swelling. Negative for chest pain, irregular heartbeat, near-syncope, orthopnea and palpitations.   Respiratory:  " Negative for cough and snoring.    Endocrine: Negative.    Skin: Negative.    Musculoskeletal: Negative.    Gastrointestinal: Negative.    Genitourinary: Negative.    Neurological: Negative.    Psychiatric/Behavioral: Negative.         Vitals:    09/24/24 1523   BP: 130/86   Pulse:    Temp:    SpO2:      Vitals:    09/24/24 1455   Weight: (!) 146 kg (321 lb 6.4 oz)     Height: 6' 3\" (190.5 cm)   Body mass index is 40.17 kg/m².    Physical Exam  Vitals and nursing note reviewed.   Constitutional:       General: He is not in acute distress.     Appearance: He is well-developed. He is obese. He is not diaphoretic.   HENT:      Head: Normocephalic and atraumatic.   Neck:      Vascular: No carotid bruit or JVD.   Cardiovascular:      Rate and Rhythm: Normal rate and regular rhythm.      Pulses: Intact distal pulses.      Heart sounds: Normal heart sounds, S1 normal and S2 normal. No murmur heard.     No friction rub. No gallop.      Comments: Bilateral lower leg edema, right > left  Pulmonary:      Effort: Pulmonary effort is normal. No respiratory distress.      Breath sounds: Normal breath sounds.   Abdominal:      General: There is no distension.      Palpations: Abdomen is soft.      Tenderness: There is no abdominal tenderness.   Skin:     General: Skin is warm and dry.      Findings: No rash.   Neurological:      Mental Status: He is alert and oriented to person, place, and time.   Psychiatric:         Mood and Affect: Mood and affect normal.         Behavior: Behavior normal.                "

## 2024-09-25 NOTE — TELEPHONE ENCOUNTER
Please let Tomasz know the insurance denied the albuterol I ordered. Please have him reach out to Dr. Nichols his PCP. Thanks!

## 2024-09-25 NOTE — TELEPHONE ENCOUNTER
PA for albuterol (PROVENTIL HFA,VENTOLIN HFA) 90 mcg/act inhaler  DENIED    Reason:(Screenshot if applicable)        Message sent to office clinical pool Yes    Denial letter scanned into Media Yes    Appeal started No (Provider will need to decide if appeal is warranted and send clinical documentation to Prior Authorization Team for initiation.)    **Please follow up with your patient regarding denial and next steps**

## 2024-10-22 ENCOUNTER — NURSE TRIAGE (OUTPATIENT)
Age: 69
End: 2024-10-22

## 2024-10-22 DIAGNOSIS — I25.10 CORONARY ARTERY DISEASE INVOLVING NATIVE CORONARY ARTERY OF NATIVE HEART WITHOUT ANGINA PECTORIS: Primary | ICD-10-CM

## 2024-10-22 DIAGNOSIS — Z78.9 STATIN INTOLERANCE: ICD-10-CM

## 2024-10-22 DIAGNOSIS — E78.2 MIXED HYPERLIPIDEMIA: ICD-10-CM

## 2024-10-22 NOTE — TELEPHONE ENCOUNTER
Thank you for the update. Please let him know ok to stay off the statin. I am ordering the injectable we discussed called Repatha. He can have his daughter at the pharmacy teach him how to administer, or, if she prefers he can bring it in on the nurse schedule for me to teach him.

## 2024-10-22 NOTE — TELEPHONE ENCOUNTER
"Chief Complaint: update    History of Present Illness (HPI): Spouse called stating pt is feeling much better since stopping the rosuvastatin 40 mg. Stated most of the symptoms of muscle aches have resolved.    VS/Weight: n/a    Pain: No    Risk Factors: CAD    Recent Testing: Holter 1/29/24, echo 11/2/2023, lipid panel 7/1/2024    Medicine: Zetia 10 mg, Norvasc, Eliquis 5 mg, Plavix, Lopressor 50 mg BID, torsemide 40 mg, potassium 20 meq    Upcoming Office Visit: Yes, 4/3/2025    Last Office Visit: 9/24/2024    Additional Comments: Pt stopped taking Crestor 9/25/24    Answer Assessment - Initial Assessment Questions  1. REASON FOR CALL or QUESTION: \"What is your reason for calling today?\" or \"How can I best help you?\" or \"What question do you have that I can help answer?\"      F/u on status    Protocols used: Information Only Call - No Triage-ADULT-[place]    "

## 2024-10-22 NOTE — TELEPHONE ENCOUNTER
Patient returned the call to the nurse line.  I read Juany Ochoa's message to him. Patient verbalized understanding and will discuss administration of the Repatha with his daughter.

## 2024-11-14 NOTE — PROGRESS NOTES
History of the Present Illness     Sita Campbell is a 63 y o  male status post right distal radius ORIF 03/01/21  Patient states he continues to have pain both radial and ulnar aspects of the wrist  He has been going to therapy 3x/week  Still has stiffness and weakness with the hand  States he has no numbness/tingling  Physical Exam     Ht 6' 3" (1 905 m)   Wt 125 kg (275 lb)   BMI 34 37 kg/m²     Right Upper Extremity:  Incision is healing well  Palpable scar tissue present  No erythema, fluctuance, drainage  FPL/EPL firing  No concerning crepitation  40 degrees supination  Near full pronation  10-20 degrees flexion  20 degrees extension  [address] Just over 0    5/5 Motor to the APB, FDI, FDP2, FDP5, EDC  Sensation intact to light touch in the median, radial, and ulnar nerve distribution  Radial pulse 2+  Data Review       Imaging:  Xrays of the right wrist taken today were personally reviewed by me and show well-aligned distal radius fracture status post ORIF  No evidence of implant failure  Assessment and Plan     63year old male status post right distal radius ORIF 03/01/21  I discussed with the patient and his  today that his xrays look good  He does have  A large ulnar styloid fracture which is common with this injury and can cause lingering ulnar sided wrist discomfort for 6-9 months after injury that should decrease with time  I also discussed reasoning for not needing to fix this at the time of surgery  Patient encouraged to continue to work on his motion exercises and was re-educated today on the proper ways to perform these to make sure they are targeted for the wrist  Starting Monday at the 6 week venessa, patient can discontinue use of brace other than as needed for heavier activities  Therapy can begin strengthening at that time and work on more aggressive motion exercises   Since patient works in maintenance, would advise he continue with limitations at work as it will take time to get motion and strength back in this hand/wrist        Follow Up: 4 weeks    To Do Next Visit: Xrays right wrist    PROCEDURES PERFORMED:  Procedures  No Procedures performed today Detail Level: Detailed Depth Of Biopsy: dermis Was A Bandage Applied: Yes Size Of Lesion In Cm: 0 Biopsy Type: H and E Biopsy Method: double edge Personna blade Anesthesia Type: 1% lidocaine without epinephrine Anesthesia Volume In Cc: 0.5 Hemostasis: Aluminum Chloride Wound Care: Petrolatum Dressing: bandage Destruction After The Procedure: No Type Of Destruction Used: Curettage Curettage Text: The wound bed was treated with curettage after the biopsy was performed. Cryotherapy Text: The wound bed was treated with cryotherapy after the biopsy was performed. Electrodesiccation Text: The wound bed was treated with electrodesiccation after the biopsy was performed. Electrodesiccation And Curettage Text: The wound bed was treated with electrodesiccation and curettage after the biopsy was performed. Silver Nitrate Text: The wound bed was treated with silver nitrate after the biopsy was performed. Lab: 6 Lab Facility: 3 Consent: Written consent was obtained and risks were reviewed including but not limited to scarring, infection, bleeding, scabbing, incomplete removal, nerve damage and allergy to anesthesia. Post-Care Instructions: I reviewed with the patient in detail post-care instructions. Wash site twice daily with gentle soap and water and apply vaseline and bandage. Notification Instructions: Patient will be notified of biopsy results. However, patient instructed to call the office if not contacted within 2 weeks. Billing Type: Third-Party Bill Information: Selecting Yes will display possible errors in your note based on the variables you have selected. This validation is only offered as a suggestion for you. PLEASE NOTE THAT THE VALIDATION TEXT WILL BE REMOVED WHEN YOU FINALIZE YOUR NOTE. IF YOU WANT TO FAX A PRELIMINARY NOTE YOU WILL NEED TO TOGGLE THIS TO 'NO' IF YOU DO NOT WANT IT IN YOUR FAXED NOTE.

## 2024-11-20 ENCOUNTER — TELEPHONE (OUTPATIENT)
Age: 69
End: 2024-11-20

## 2024-11-20 NOTE — TELEPHONE ENCOUNTER
Pt's wife returned the call.  Message from Juany FUNK reviewed with pt's wife Betzy.  Betzy verbalized understanding and has no further questions at this time.

## 2024-11-20 NOTE — TELEPHONE ENCOUNTER
Please let them know I wish him the very best. Please see if I need to fill out paperwork?  OK for 48-72 hour Eliquis hold.  I would prefer he be on aspirin 81 mg daily while off Plavix - ok for Plavix 5 day hold, but take aspirin 81 mg daily while off the Plavix.

## 2024-11-20 NOTE — TELEPHONE ENCOUNTER
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    Betzy said Tomasz was advised to hold anticoagulants prior to procedure.    Patient is taking Eliquis and Plavix .  Please advise if ok to hold and for how long.

## 2025-01-13 ENCOUNTER — NURSE TRIAGE (OUTPATIENT)
Age: 70
End: 2025-01-13

## 2025-01-13 NOTE — TELEPHONE ENCOUNTER
"Reason for Disposition   Caller has NON-URGENT medicine question about med that PCP or specialist prescribed and triager unable to answer question    Answer Assessment - Initial Assessment Questions  1. NAME of MEDICINE: \"What medicine(s) are you calling about?\"      Eliquis  2. QUESTION: \"What is your question?\" (e.g., double dose of medicine, side effect)      Cause of Phuc the script is 570 dollars for the next 5 months and due to there high deducible   3. PRESCRIBER: \"Who prescribed the medicine?\" Reason: if prescribed by specialist, call should be referred to that group.      Cardiology   Needs another options, or samples.    He only has one for tonight, and tomorrow.     Please advise    Protocols used: Medication Question Call-Adult-[place]    "

## 2025-01-13 NOTE — TELEPHONE ENCOUNTER
Ok to give him 1 box of Eliquis. Please have him check his formulary to find out the cost of dabigatran 150 mg twice daily as that is a generic blood thinner that may be more affordable. Let me know if he wants me to order to his pharmacy.  Thank you

## 2025-02-11 ENCOUNTER — NURSE TRIAGE (OUTPATIENT)
Age: 70
End: 2025-02-11

## 2025-02-11 NOTE — TELEPHONE ENCOUNTER
Pt states that he is feeling better. States that over the last few hours it has gone away a lot. He did stand up from sitting while on the phone with me, and states that he feels fine. I did advise that if it would come back that he should either go to urgent care or the ED. Pt is aware and agreeable.

## 2025-02-11 NOTE — TELEPHONE ENCOUNTER
"Reason for Conversation: Pt was last seen on 9/24/2024. Received call from pt's spouse, Betzy, stating starting in the past 3-4 days, he has been feeling more dizzy/lightheaded and fatigue. She stated he is just getting over a cold and thought his s/s were due to that however his s/s has lingered even after the cold s/s are diminishing. The dizziness occurs when he is changing positions / gets up too quickly. She stated when he gets this feeling, he has to sit down and let the feeling pass which takes about a min. She also stated he has been very fatigued which is preventing him from doing his normal activities. Denies any chest pain, sob, fever, chills.     An appt was made for pt for 2/17 however advised will also send a message to the provider to review in the meantime. Of note, pt does not check his BP nor HR at home. Advised spouse to start keeping a log of these values to gather more data. She is agreeable.     VS/Weight: (Note: Please include date/time vitals/weight were measured)  does not take    Pain: No    Risk Factors: CAD, afib, hTN    Recent relevant testing and date of testing: holter 1/29/2024, echo 11/2/2023    Medication: Norvasc 2.5mg daily, metoprolol 50mg bid, eliquis 5mg bid, and plavix 75mg     Upcoming Office Visit: Yes 2/17    Last Office Visit: 9/24/2024        Answer Assessment - Initial Assessment Questions  1. DESCRIPTION: \"Describe your dizziness.\"      Feels dizziness when getting up too quickly / or when coughs   2. LIGHTHEADED: \"Do you feel lightheaded?\" (e.g., somewhat faint, woozy, weak upon standing)      Yes   3. VERTIGO: \"Do you feel like either you or the room is spinning or tilting?\" (i.e., vertigo)      Denies   4. SEVERITY: \"How bad is it?\"  \"Do you feel like you are going to faint?\" \"Can you stand and walk?\"      Has to sit down when he feels the lightheadedness. Lasts for about a min  5. ONSET:  \"When did the dizziness begin?\"      4-5 days. Getting over a cold now   6. " "AGGRAVATING FACTORS: \"Does anything make it worse?\" (e.g., standing, change in head position)      Getting up / position changes   7. HEART RATE: \"Can you tell me your heart rate?\" \"How many beats in 15 seconds?\"  (Note: Not all patients can do this.)        Does not check his BP nor HR   8. CAUSE: \"What do you think is causing the dizziness?\" (e.g., decreased fluids or food, diarrhea, emotional distress, heat exposure, new medicine, sudden standing, vomiting; unknown)      Possibly heart related   9. RECURRENT SYMPTOM: \"Have you had dizziness before?\" If Yes, ask: \"When was the last time?\" \"What happened that time?\"      Last times was when he had his BP meds changed   10. OTHER SYMPTOMS: \"Do you have any other symptoms?\" (e.g., fever, chest pain, vomiting, diarrhea, bleeding)        Tired/fatigue. Always has a little sob that is common for him. He is a smoker. Denies chest pain, fever.    Answer Assessment - Initial Assessment Questions  1. DESCRIPTION: \"Describe how you are feeling.\"      States feels very fatigued and feels like he has no energy   2. SEVERITY: \"How bad is it?\"  \"Can you stand and walk?\"      Interferes with his normal activities   3. ONSET: \"When did these symptoms begin?\" (e.g., hours, days, weeks, months)      4-5 days ago   4. CAUSE: \"What do you think is causing the weakness or fatigue?\" (e.g., not drinking enough fluids, medical problem, trouble sleeping)      Unsure   5. NEW MEDICINES:  \"Have you started on any new medicines recently?\" (e.g., opioid pain medicines, benzodiazepines, muscle relaxants, antidepressants, antihistamines, neuroleptics, beta blockers)      denies  6. OTHER SYMPTOMS: \"Do you have any other symptoms?\" (e.g., chest pain, fever, cough, SOB, vomiting, diarrhea, bleeding, other areas of pain)      Dizziness/lightheadedness    Protocols used: Dizziness-Adult-[place], Weakness (Generalized) and Fatigue-Adult-[place]    "

## 2025-02-11 NOTE — TELEPHONE ENCOUNTER
I recommend urgent care or ER evaluation as it seems he is feeling quite poorly and that should not wait for next week.

## 2025-02-13 RX ORDER — LEVOCETIRIZINE DIHYDROCHLORIDE 5 MG/1
5 TABLET, FILM COATED ORAL EVERY EVENING
COMMUNITY
Start: 2024-11-14

## 2025-02-14 NOTE — H&P (VIEW-ONLY)
Cardiology Follow Up    Terry Kemmerling  1955  [id number]  [facility] CARDIOVASC PHYS  [address] [facility] [address] [phone]  [phone]    Tomasz presents for evaluation of dizziness.     1. Paroxysmal atrial fibrillation (HCC)  Assessment & Plan:  A fib with RVR after coronary intervention.  Maintained on Eliquis 5 mg BID.  Presents today with 2 weeks of dizziness. ECG shows A fib with ventricular rate 104 bpm.  Continue Eliquis 5 mg BID (missed 1 evening dose this week)  Transition off metoprolol tartrate to metoprolol succinate 50 mg BID.  EKG in office within 7 days. He will monitor HR's with his Apple Watch in the mean time.  Plan cardioversion in 3 weeks. No med holds, take Eliquis and metoprolol the morning of with sips of water. Instructed he will need a .  Urged faithful CPAP use.  Labs today.  Echo prior to cardioversion.  We reviewed urgent s/s to report and when to seek immediate medical attention.  Orders:  -     Magnesium; Future  -     Basic metabolic panel; Future  -     Cardioversion; Future; Expected date: 02/17/2025  -     Echo complete w/ contrast if indicated; Future; Expected date: 02/17/2025  -     POCT ECG  2. Coronary artery disease involving native coronary artery of native heart without angina pectoris  Assessment & Plan:  Coronary Artery Disease:  A. Inferior MI 6/4/2004 with VF arrest.  B. PCI and stent to RCA 6/4/2004.  C. Cardiac catheterization 10/2005: LM ok. LAD 20-30% proximal stenosis. Dx 50% ostial. LCx 50% OM and PLB with 50%. RCA 40% ISR. No intervention. EF 60%.  D. Non ischemic Lexiscan stress test 8/12/2019. EF 54%.  E. Lexiscan stress test 6/23/2022 with inferior scar with large periscar ischemia.  F. Cardiac cath 7/19/22: 80% LAD(p-m) with significant iFR, 70% 1st OM, 99% RCA(p-m); shockwave/PCI/RO to LAD, PCI and RO x 2 to RCA.  - - - - - - - -  Echocardiogram 11/2023 with EF 60%.  No anginal complaints.  Continue Plavix 75 mg daily with  Eliquis 5 mg BID. No aspirin to avoid triple therapy.  Continue rosuvastatin 40 mg daily.  Strongly urged smoking cessation.  Reviewed s/s to report.  Will monitor.  Orders:  -     metoprolol succinate (TOPROL-XL) 50 mg 24 hr tablet; Take 1 tablet (50 mg total) by mouth 2 (two) times a day  -     rosuvastatin (CRESTOR) 40 MG tablet; Take 1 tablet (40 mg total) by mouth daily  -     nitroglycerin (NITROSTAT) 0.4 mg SL tablet; Place 1 tablet (0.4 mg total) under the tongue every 5 (five) minutes as needed for chest pain  3. Primary hypertension  Assessment & Plan:  Blood pressure is acceptable.  Benazepril was stopped in the hospital due to angioedema.  Reports only taking 2.5 mg of amlodipine currently - will verify.  Continue doxazosin 1 mg daily and torsemide to 40 mg daily.  Will monitor with transition from metoprolol tartrate to metoprolol succinate 50 mg BID.  Reviewed importance of CPAP compliance, 2 g sodium diet, smoking cessation, and weight control.  He will monitor BP at home and report readings > 140/90.  Labs today  4. Mixed hyperlipidemia  Assessment & Plan:  Patient is currently on rosuvastatin 40 mg daily, Zetia 10 mg daily.  Goal LDL < 70.  Lipid panel 10/27/2023: C 132. T 195. H 42. L 51.  Direct LDL 70 on 7/1/2024 labs.  Repatha was too expensive.     5. Obstructive sleep apnea syndrome  Assessment & Plan:  We reviewed the correlation between untreated sleep apnea and atrial fibrillation.  Urged faithful CPAP use.  Referral to sleep medicine for additional treatment.     LYSSA Tolbert has a past medical history of CAD, paroxysmal atrial fibrillation on Eliquis, HTN, HLD, obesity, ongoing tobacco use. History of angioedema related to ACE inhibitor.     He sustained an MI with VF arrest 2004 at which time he underwent PCI of the right coronary artery.        He was admitted to [facility] 7/9/2020 with swelling of his face and tongue.  This occurred after eating dinner on 07/09/2020.  The  patient was given Benadryl and steroids as well as epinephrine it was felt that the patient likely had developed angioedema from ACE-inhibitor.      He is a long time patient of Dr. Boyd and re-established with her on 8/12/2020   He had undergone a nuclear stress test 08/11/2019 which showed ejection fraction of 54% and was in normal limits.      Tomasz followed up 5/6/2022. He had gained 22 pounds since his visit the prior year. He had changed jobs and was much less active. He had not been compliant with his CPAP. He continued to smoke, about 1/2 PPD. He denied chest pain but reported progressively worsening dyspnea on exertion along with leg swelling. Updated echocardiogram and nuclear stress testing were ordered.     Echocardiogram 6/23/22 showed preserved LVEF with no significant wall motion or valvular abnormalities. Lexiscan nuclear stress test 6/23/22 showed an inferior wall scar with significant tk-scar ischemia. Therefore he was referred to undergo cardiac catheterization with nephrology consultation prior given his renal function.     Tomasz underwent cardiac catheterization at [facility] on 7/19/2022 which showed 80% stenosis of the proximal to mid LAD, iFR was significant. 70% stenosis to 1st marginal. 99% stenosis of proximal to mid RCA. He underwent shockwave to the proximal to mid LAD lesion followed by PCI with RO. PCI and RO x 2 placed to proximal to mid RCA lesion. He received 230 mL of IV contrast/ 61.4 minutes of fluro time (excess fluoro time was reviewed with patient by Dr. Gomes and he was counseled on observing for radiation burn). He did receive pre-hydration per ESTELLE protocol and aggressive fluid resuscitation 150 mL/hr overnight. Admitting creatinine/GFR 1.48/48, discharge creatinine/GFR 1.14/67. He required an IV nitro drip overnight due to significant hypertension. He did go into rapid atrial fibrillation following the intervention. He was started on Eliquis 5 mg BID and Plavix 75 mg daily  (no aspirin as per Dr. Gomes).      He followed up 7/27/2022 at which time he reported complete resolution of his shortness of breath since coronary intervention. BP was much improved. He denied palpitations. No bleeding issues. He was using his CPAP faithfully. ECG showed sinus bradycardia. Chest examined with no evidence of radiation burn.     2/17/2025: Tomasz presents accompanied by his wife for evaluation of dizziness, shortness of breath, and fatigue. Symptoms started about 2 weeks ago. He reports having a URI at the time. URI symptoms resolved but the dizziness, fatigue persist. Last night he felt chest discomfort while lying on his left side that resolved with rolling over. He continues to smoke. He has drastically modified his diet and his weight is down 20 pounds compared to May on our scale today. He denies any exertional discomfort. No syncope/near syncope. No edema or orthopnea. ECG today shows atrial fibrillation with ventricular rate 104 bpm. His wife ordered him an Apple watch which should arrive this week for HR monitoring.     Medical Problems       Problem List       Esophagitis    H/O acute myocardial infarction    Coronary artery disease involving native coronary artery    S/P angioplasty with stent    Paroxysmal atrial fibrillation (HCC)    HTN (hypertension)    Hyperlipidemia    Sleep apnea    Localized edema    Chronic kidney disease (CKD), stage III (moderate) ([facility])    Dyspnea on exertion    Tobacco use    Impaired fasting glucose    Bradycardia        Past Medical History:   Diagnosis Date    Chronic kidney disease     Coronary artery disease     CPAP (continuous positive airway pressure) dependence     Intermittant    GERD (gastroesophageal reflux disease)     H/O heart artery stent     Heart attack (HCC)     Hyperlipidemia     Hypertension     MI (myocardial infarction) (HCC)     Myocardial infarction (HCC)     2004 x 1 stent    LUCIO (obstructive sleep apnea)     Tobacco use      Social  History     Socioeconomic History    Marital status: /Civil Union     Spouse name: Not on file    Number of children: Not on file    Years of education: Not on file    Highest education level: Not on file   Occupational History    Not on file   Tobacco Use    Smoking status: Former     Current packs/day: 0.00     Types: Cigarettes     Quit date: 2022     Years since quittin.6    Smokeless tobacco: Never    Tobacco comments:     Occ Cigs   Vaping Use    Vaping status: Never Used   Substance and Sexual Activity    Alcohol use: Not Currently    Drug use: Never    Sexual activity: Not on file     Comment: Defer   Other Topics Concern    Not on file   Social History Narrative    Not on file     Social Drivers of Health     Financial Resource Strain: Low Risk  (2024)    Received from [facility]    Overall Financial Resource Strain (CARDIA)     Difficulty of Paying Living Expenses: Not hard at all   Food Insecurity: No Food Insecurity (2024)    Received from [facility]    Hunger Vital Sign     Worried About Running Out of Food in the Last Year: Never true     Ran Out of Food in the Last Year: Never true   Transportation Needs: No Transportation Needs (2024)    Received from [facility]    PRAPARE - Transportation     Lack of Transportation (Medical): No     Lack of Transportation (Non-Medical): No   Physical Activity: Not on file   Stress: No Stress Concern Present (2024)    Received from [facility]    [employer] - Occupational Stress Questionnaire     Feeling of Stress : Not at all   Social Connections: Feeling Socially Integrated (2024)    Received from [facility]    OASIS : Social Isolation     How often do you feel lonely or isolated from those around you?: Never   Intimate Partner Violence: Not At Risk (2024)    Received from [facility],  [facility]    Humiliation, Afraid, Rape, and Kick questionnaire     Fear of Current or Ex-Partner: No     Emotionally Abused: No     Physically Abused: No     Sexually Abused: No   Housing Stability: Unknown (7/1/2024)    Received from [facility]    Housing Stability Vital Sign     Unable to Pay for Housing in the Last Year: No     Number of Times Moved in the Last Year: Not on file     Homeless in the Last Year: No      Family History   Problem Relation Age of Onset    Hypertension Mother     Arthritis Mother     Hypertension Father     Hyperlipidemia Father     Hypertension Sister     Hyperlipidemia Sister     Hyperlipidemia Brother     Diabetes Paternal Grandmother      Past Surgical History:   Procedure Laterality Date    BACK SURGERY  2012    CARDIAC CATHETERIZATION  06/04/2004    PTCA and Cypher stent placement to the RCA(p).     CARDIAC CATHETERIZATION  09/16/2005    LAD(p) 20-30%. Dx 50% ostial. LCx 50%. OM and PLB 50%. RCA 40% ISR. No intervention. EF 60%.    CARDIAC CATHETERIZATION N/A 7/19/2022    Procedure: Cardiac Coronary Angiogram;  Surgeon: Daniel Gomes MD;  Location:  CARDIAC CATH LAB;  Service: Cardiology    CARDIAC CATHETERIZATION  7/19/2022    Procedure: Cardiac catheterization;  Surgeon: Daniel Gomes MD;  Location:  CARDIAC CATH LAB;  Service: Cardiology    CARDIAC CATHETERIZATION N/A 7/19/2022    Procedure: Cardiac pci;  Surgeon: Daniel Gomes MD;  Location:  CARDIAC CATH LAB;  Service: Cardiology    CARDIAC SURGERY      HERNIA REPAIR      AK OPTX DSTL RADL I-ARTIC FX/EPIPHYSL SEP 2 FRAG Right 3/1/2021    Procedure: OPEN REDUCTION W/ INTERNAL FIXATION (ORIF) DISTAL RADIUS;  Surgeon: Cyrus Fishman MD;  Location: [facility];  Service: Orthopedics    ROTATOR CUFF REPAIR         Current Outpatient Medications:     albuterol (PROVENTIL HFA,VENTOLIN HFA) 90 mcg/act inhaler, Inhale 2 puffs every 4 (four) hours as needed for wheezing, Disp: 18 g, Rfl: 3     allopurinol (ZYLOPRIM) 300 mg tablet, TAKE (1) TABLET BY MOUTH ONCE DAILY., Disp: , Rfl:     amLODIPine (NORVASC) 2.5 mg tablet, Take 1 tablet (2.5 mg total) by mouth daily Take with 5 mg for total of 7.5 mg daily, Disp: 90 tablet, Rfl: 3    apixaban (ELIQUIS) 5 mg, Take 1 tablet (5 mg total) by mouth 2 (two) times a day, Disp: 180 tablet, Rfl: 3    clopidogrel (PLAVIX) 75 mg tablet, Take 1 tablet (75 mg total) by mouth daily, Disp: 90 tablet, Rfl: 3    co-enzyme Q-10 100 mg capsule, Take 100 mg by mouth daily , Disp: , Rfl:     doxazosin (CARDURA) 1 mg tablet, Take 1 tablet (1 mg total) by mouth daily, Disp: 90 tablet, Rfl: 3    ezetimibe (ZETIA) 10 mg tablet, Take 1 tablet (10 mg total) by mouth daily at bedtime, Disp: 90 tablet, Rfl: 3    fluticasone (FLONASE) 50 mcg/act nasal spray, 2 sprays into each nostril every 12 (twelve) hours as needed (sinus infections) , Disp: , Rfl:     lansoprazole (PREVACID) 30 mg capsule, TAKE 1 CAPSULE BY MOUTH ONCE DAILY., Disp: , Rfl:     metoprolol succinate (TOPROL-XL) 50 mg 24 hr tablet, Take 1 tablet (50 mg total) by mouth 2 (two) times a day, Disp: 60 tablet, Rfl: 11    nitroglycerin (NITROSTAT) 0.4 mg SL tablet, Place 1 tablet (0.4 mg total) under the tongue every 5 (five) minutes as needed for chest pain, Disp: 20 tablet, Rfl: 0    rosuvastatin (CRESTOR) 40 MG tablet, Take 1 tablet (40 mg total) by mouth daily, Disp: 90 tablet, Rfl: 3    torsemide (DEMADEX) 20 mg tablet, Take 2 tablets (40 mg total) by mouth daily, Disp: 180 tablet, Rfl: 3    levocetirizine (XYZAL) 5 MG tablet, Take 5 mg by mouth every evening (Patient not taking: Reported on 2/17/2025), Disp: , Rfl:     potassium chloride (Klor-Con M20) 20 mEq tablet, Take 1 tablet (20 mEq total) by mouth daily, Disp: 90 tablet, Rfl: 3  Allergies   Allergen Reactions    Ace Inhibitors Angioedema    Alprazolam Other (See Comments)     Other reaction(s): Other: Document details in comments  SEVERE  DISOREINTATION/CRAZY  SEVERE DISOREINTATION/CRAZY      Benazepril Other (See Comments)    Buspirone      Other reaction(s): Other (See Comments), Other: Document details in comments  SEVERE DISORIENTATION/CRAZY  SEVERE DISORIENTATION/CRAZY      Lorazepam      Other reaction(s): Other (See Comments), Other: Document details in comments  SEVERE DISORIENTATION/CRAZY  SEVERE DISORIENTATION/CRAZY         Labs:     Chemistry        Component Value Date/Time    K 3.9 11/01/2024 0810    CL 97 (L) 11/01/2024 0810    CO2 29 11/01/2024 0810    BUN 16 11/01/2024 0810    CREATININE 1.64 (H) 11/01/2024 0810        Component Value Date/Time    CALCIUM 9.2 11/01/2024 0810    ALKPHOS 140 (H) 11/01/2024 0810    AST 16 11/01/2024 0810    ALT 13 11/01/2024 0810        ECG 2/17/2025: Atrial fibrillation with  bpm. LAD. Low voltage QRS. Inferior infarct. Poor anterior R wave progression.    Lipid panel 7/1/2024: Direct LDL 70     24 hour Holter monitor 1/29/2024:  Predominantly sinus rhythm, HR , avg 59 bpm.  71 PVC's. 9483 PAC's(11.2%) with one 5-beat atrial run. No atrial fibrillation.  Longest R/R 2.4 seconds.     Echo 11/2/2023:  LVEF 60%, mild LVH. Grade 1 DD.  Mild fusion of left and noncoronary cusps of aortic valve.  Mild TR. PASP 34mmHg.     Lipid panel 10/27/2023: C 132. T 195. H 42. L 51.     ECG 6/29/2023: Sinus bradycardia with sinus arrythmia. Poor anterior R wave progression. Rate 58 bpm.     Lipid panel 6/27/2023: C 145. T 237. H 48. L 50.     ZIO 7/27-8/10/2022:  Predominantly sinus rhythm with slight P wave morphology changes noted.  HR , avg 53 bpm.  183 runs of SVT, longest 53.8 seconds with avg  bpm.  Wenckebach present during sleep.  1.1% PAC burden. Rare PVC's.     ECG 7/27/2022: Sinus bradycardia, inferior infarct, age undetermined. Rate 54 bpm.     Cardiac catheterization 7/19/2022:  Prox LAD to Mid LAD lesion is 80% stenosed. · 1st Mrg lesion is 70% stenosed. · Prox RCA to Mid RCA lesion  is 99% stenosed.  Multivessel CAD. A long 80% stenosis of the proximal and mid LAD was interrogated by iFR and was flow-limiting (iFR=0.82).  Successful IVUS-guided PCI of proximal and mid LAD performed. After shockwave lithotripsy and pre-dilation, a 3.0x38 RO was placed under GuiderLiner support. There was no residual stenosis. IVUS-guided PCI of in-stent restenosis of the mid RCA was then performed. A 2.5x38mm RO was placed under GuideLiner support. Because of a possible edge dissection, a 2.5x12mm RO was overlapped at the distal margin of the first stent. There was no residual stenosis. Disease of OM1 was not treated but could be addressed if symptoms recur.      Lexiscan nuclear stress test 6/23/2022:   Post-stress ejection fraction is 60 %. There is mild hypokinesis of the basal to mid inferior wall.  Perfusion: There is a medium-sized, moderate intensity perfusion defect in the basal to distal inferior wall, with partial reversibility in the distal inferior wall consistent with myocardial scar with significant tk-scar ischemia.     Echocardiogram 6/23/2022:  EF 60%. Trace MR. Trace TR.      ECG 5/6/2022: Sinus bradycardia with PAC's. Inferior infarct. Rate 51 bpm.     Lipid 4/1/2022: Triglycerides 268. Direct LDL 69.     Lipid Panel 2/9/2021: C 163. T 215. H 38. L 82.     Echocardiogram 9/18/2020:   EF 55-60%.  Upper normal wall thickness.  Mild diastolic dysfunction.  Mildly dilated left atrium.  Trace mitral regurgitation.  Trace to mild tricuspid regurgitation.  No significant change compared to prior study 05/11/2015.     Nuclear Lexiscan Stress Test 8/12/2019:   Normal study. EF 54%.     Echocardiogram(5/11/2015):  EF 55-60%.  Mild MR. Mild TR.     Holter Monitor - (7/6/2004) 27 VE beats. 1 idioventricular episode 2.4 seconds.  EKG - (4/15/2016) NSR.70 BPM. Inferior MI.  Nuclear Stress Test - (5/11/2015) Lexiscan. No scar. No ischemia. EF 57%.     Cardiac Procedures:     Cardiac Catheterization -  "(9/16/2005) LM ok. LAD 20-30% proximal stenosis. Dx 50% ostial. LCx 50% OM and PLB with 50%. RCA 40% ISR. No intervention. EF 60%.  Cardiac Catheterization - (6/4/2004) PTCA and Cypher stent placement to the Proximal RCA.  Percutaneous Coronary Intervention - (6/4/2004) Cypher stent to RCA.    Review of Systems   Constitutional: Positive for malaise/fatigue.   HENT: Negative.     Cardiovascular:  Positive for dyspnea on exertion and palpitations. Negative for chest pain, irregular heartbeat, near-syncope and orthopnea.   Respiratory:  Negative for cough and snoring.    Endocrine: Negative.    Skin: Negative.    Musculoskeletal: Negative.    Gastrointestinal: Negative.    Genitourinary: Negative.    Neurological:  Positive for dizziness.   Psychiatric/Behavioral: Negative.         Vitals:    02/17/25 1127   BP: 138/72   Pulse: 93   Temp: 97.6 °F (36.4 °C)   SpO2: 95%     Vitals:    02/17/25 1127   Weight: (!) 139 kg (307 lb)     Height: 6' 3\" (190.5 cm)   Body mass index is 38.37 kg/m².    Physical Exam  Vitals and nursing note reviewed.   Constitutional:       General: He is not in acute distress.     Appearance: He is well-developed. He is obese. He is not diaphoretic.   HENT:      Head: Normocephalic and atraumatic.   Neck:      Vascular: No JVD.   Cardiovascular:      Rate and Rhythm: Normal rate. Rhythm irregularly irregular.      Pulses: Intact distal pulses.      Heart sounds: Normal heart sounds, S1 normal and S2 normal. No murmur heard.     No friction rub. No gallop.      Comments: No edema  Pulmonary:      Effort: Pulmonary effort is normal. No respiratory distress.      Breath sounds: Examination of the right-lower field reveals wheezing. Wheezing present.   Abdominal:      General: There is no distension.      Palpations: Abdomen is soft.      Tenderness: There is no abdominal tenderness.   Skin:     General: Skin is warm and dry.      Findings: No rash.   Neurological:      Mental Status: He is alert " and oriented to person, place, and time.   Psychiatric:         Behavior: Behavior normal.

## 2025-02-14 NOTE — PROGRESS NOTES
Cardiology Follow Up    Terry Kemmerling  1955  [id number]  [facility] CARDIOVASC PHYS  [address] [facility] [address] [phone]  [phone]    Tomasz presents for evaluation of dizziness.     1. Paroxysmal atrial fibrillation (HCC)  Assessment & Plan:  A fib with RVR after coronary intervention.  Maintained on Eliquis 5 mg BID.  Presents today with 2 weeks of dizziness. ECG shows A fib with ventricular rate 104 bpm.  Continue Eliquis 5 mg BID (missed 1 evening dose this week)  Transition off metoprolol tartrate to metoprolol succinate 50 mg BID.  EKG in office within 7 days. He will monitor HR's with his Apple Watch in the mean time.  Plan cardioversion in 3 weeks. No med holds, take Eliquis and metoprolol the morning of with sips of water. Instructed he will need a .  Urged faithful CPAP use.  Labs today.  Echo prior to cardioversion.  We reviewed urgent s/s to report and when to seek immediate medical attention.  Orders:  -     Magnesium; Future  -     Basic metabolic panel; Future  -     Cardioversion; Future; Expected date: 02/17/2025  -     Echo complete w/ contrast if indicated; Future; Expected date: 02/17/2025  -     POCT ECG  2. Coronary artery disease involving native coronary artery of native heart without angina pectoris  Assessment & Plan:  Coronary Artery Disease:  A. Inferior MI 6/4/2004 with VF arrest.  B. PCI and stent to RCA 6/4/2004.  C. Cardiac catheterization 10/2005: LM ok. LAD 20-30% proximal stenosis. Dx 50% ostial. LCx 50% OM and PLB with 50%. RCA 40% ISR. No intervention. EF 60%.  D. Non ischemic Lexiscan stress test 8/12/2019. EF 54%.  E. Lexiscan stress test 6/23/2022 with inferior scar with large periscar ischemia.  F. Cardiac cath 7/19/22: 80% LAD(p-m) with significant iFR, 70% 1st OM, 99% RCA(p-m); shockwave/PCI/RO to LAD, PCI and RO x 2 to RCA.  - - - - - - - -  Echocardiogram 11/2023 with EF 60%.  No anginal complaints.  Continue Plavix 75 mg daily with  Eliquis 5 mg BID. No aspirin to avoid triple therapy.  Continue rosuvastatin 40 mg daily.  Strongly urged smoking cessation.  Reviewed s/s to report.  Will monitor.  Orders:  -     metoprolol succinate (TOPROL-XL) 50 mg 24 hr tablet; Take 1 tablet (50 mg total) by mouth 2 (two) times a day  -     rosuvastatin (CRESTOR) 40 MG tablet; Take 1 tablet (40 mg total) by mouth daily  -     nitroglycerin (NITROSTAT) 0.4 mg SL tablet; Place 1 tablet (0.4 mg total) under the tongue every 5 (five) minutes as needed for chest pain  3. Primary hypertension  Assessment & Plan:  Blood pressure is acceptable.  Benazepril was stopped in the hospital due to angioedema.  Reports only taking 2.5 mg of amlodipine currently - will verify.  Continue doxazosin 1 mg daily and torsemide to 40 mg daily.  Will monitor with transition from metoprolol tartrate to metoprolol succinate 50 mg BID.  Reviewed importance of CPAP compliance, 2 g sodium diet, smoking cessation, and weight control.  He will monitor BP at home and report readings > 140/90.  Labs today  4. Mixed hyperlipidemia  Assessment & Plan:  Patient is currently on rosuvastatin 40 mg daily, Zetia 10 mg daily.  Goal LDL < 70.  Lipid panel 10/27/2023: C 132. T 195. H 42. L 51.  Direct LDL 70 on 7/1/2024 labs.  Repatha was too expensive.     5. Obstructive sleep apnea syndrome  Assessment & Plan:  We reviewed the correlation between untreated sleep apnea and atrial fibrillation.  Urged faithful CPAP use.  Referral to sleep medicine for additional treatment.     LYSSA Tolbert has a past medical history of CAD, paroxysmal atrial fibrillation on Eliquis, HTN, HLD, obesity, ongoing tobacco use. History of angioedema related to ACE inhibitor.     He sustained an MI with VF arrest 2004 at which time he underwent PCI of the right coronary artery.        He was admitted to [facility] 7/9/2020 with swelling of his face and tongue.  This occurred after eating dinner on 07/09/2020.  The  patient was given Benadryl and steroids as well as epinephrine it was felt that the patient likely had developed angioedema from ACE-inhibitor.      He is a long time patient of Dr. Boyd and re-established with her on 8/12/2020   He had undergone a nuclear stress test 08/11/2019 which showed ejection fraction of 54% and was in normal limits.      Tomasz followed up 5/6/2022. He had gained 22 pounds since his visit the prior year. He had changed jobs and was much less active. He had not been compliant with his CPAP. He continued to smoke, about 1/2 PPD. He denied chest pain but reported progressively worsening dyspnea on exertion along with leg swelling. Updated echocardiogram and nuclear stress testing were ordered.     Echocardiogram 6/23/22 showed preserved LVEF with no significant wall motion or valvular abnormalities. Lexiscan nuclear stress test 6/23/22 showed an inferior wall scar with significant tk-scar ischemia. Therefore he was referred to undergo cardiac catheterization with nephrology consultation prior given his renal function.     Tomasz underwent cardiac catheterization at [facility] on 7/19/2022 which showed 80% stenosis of the proximal to mid LAD, iFR was significant. 70% stenosis to 1st marginal. 99% stenosis of proximal to mid RCA. He underwent shockwave to the proximal to mid LAD lesion followed by PCI with RO. PCI and RO x 2 placed to proximal to mid RCA lesion. He received 230 mL of IV contrast/ 61.4 minutes of fluro time (excess fluoro time was reviewed with patient by Dr. Gomes and he was counseled on observing for radiation burn). He did receive pre-hydration per ESTELLE protocol and aggressive fluid resuscitation 150 mL/hr overnight. Admitting creatinine/GFR 1.48/48, discharge creatinine/GFR 1.14/67. He required an IV nitro drip overnight due to significant hypertension. He did go into rapid atrial fibrillation following the intervention. He was started on Eliquis 5 mg BID and Plavix 75 mg daily  (no aspirin as per Dr. Gomes).      He followed up 7/27/2022 at which time he reported complete resolution of his shortness of breath since coronary intervention. BP was much improved. He denied palpitations. No bleeding issues. He was using his CPAP faithfully. ECG showed sinus bradycardia. Chest examined with no evidence of radiation burn.     2/17/2025: Tomasz presents accompanied by his wife for evaluation of dizziness, shortness of breath, and fatigue. Symptoms started about 2 weeks ago. He reports having a URI at the time. URI symptoms resolved but the dizziness, fatigue persist. Last night he felt chest discomfort while lying on his left side that resolved with rolling over. He continues to smoke. He has drastically modified his diet and his weight is down 20 pounds compared to May on our scale today. He denies any exertional discomfort. No syncope/near syncope. No edema or orthopnea. ECG today shows atrial fibrillation with ventricular rate 104 bpm. His wife ordered him an Apple watch which should arrive this week for HR monitoring.     Medical Problems       Problem List       Esophagitis    H/O acute myocardial infarction    Coronary artery disease involving native coronary artery    S/P angioplasty with stent    Paroxysmal atrial fibrillation (HCC)    HTN (hypertension)    Hyperlipidemia    Sleep apnea    Localized edema    Chronic kidney disease (CKD), stage III (moderate) ([facility])    Dyspnea on exertion    Tobacco use    Impaired fasting glucose    Bradycardia        Past Medical History:   Diagnosis Date    Chronic kidney disease     Coronary artery disease     CPAP (continuous positive airway pressure) dependence     Intermittant    GERD (gastroesophageal reflux disease)     H/O heart artery stent     Heart attack (HCC)     Hyperlipidemia     Hypertension     MI (myocardial infarction) (HCC)     Myocardial infarction (HCC)     2004 x 1 stent    LUCIO (obstructive sleep apnea)     Tobacco use      Social  History     Socioeconomic History    Marital status: /Civil Union     Spouse name: Not on file    Number of children: Not on file    Years of education: Not on file    Highest education level: Not on file   Occupational History    Not on file   Tobacco Use    Smoking status: Former     Current packs/day: 0.00     Types: Cigarettes     Quit date: 2022     Years since quittin.6    Smokeless tobacco: Never    Tobacco comments:     Occ Cigs   Vaping Use    Vaping status: Never Used   Substance and Sexual Activity    Alcohol use: Not Currently    Drug use: Never    Sexual activity: Not on file     Comment: Defer   Other Topics Concern    Not on file   Social History Narrative    Not on file     Social Drivers of Health     Financial Resource Strain: Low Risk  (2024)    Received from [facility]    Overall Financial Resource Strain (CARDIA)     Difficulty of Paying Living Expenses: Not hard at all   Food Insecurity: No Food Insecurity (2024)    Received from [facility]    Hunger Vital Sign     Worried About Running Out of Food in the Last Year: Never true     Ran Out of Food in the Last Year: Never true   Transportation Needs: No Transportation Needs (2024)    Received from [facility]    PRAPARE - Transportation     Lack of Transportation (Medical): No     Lack of Transportation (Non-Medical): No   Physical Activity: Not on file   Stress: No Stress Concern Present (2024)    Received from [facility]    [employer] [place] [employer] - Occupational Stress Questionnaire     Feeling of Stress : Not at all   Social Connections: Feeling Socially Integrated (2024)    Received from [facility]    OASIS : Social Isolation     How often do you feel lonely or isolated from those around you?: Never   Intimate Partner Violence: Not At Risk (2024)    Received from [facility],  [facility]    Humiliation, Afraid, Rape, and Kick questionnaire     Fear of Current or Ex-Partner: No     Emotionally Abused: No     Physically Abused: No     Sexually Abused: No   Housing Stability: Unknown (7/1/2024)    Received from [facility]    Housing Stability Vital Sign     Unable to Pay for Housing in the Last Year: No     Number of Times Moved in the Last Year: Not on file     Homeless in the Last Year: No      Family History   Problem Relation Age of Onset    Hypertension Mother     Arthritis Mother     Hypertension Father     Hyperlipidemia Father     Hypertension Sister     Hyperlipidemia Sister     Hyperlipidemia Brother     Diabetes Paternal Grandmother      Past Surgical History:   Procedure Laterality Date    BACK SURGERY  2012    CARDIAC CATHETERIZATION  06/04/2004    PTCA and Cypher stent placement to the RCA(p).     CARDIAC CATHETERIZATION  09/16/2005    LAD(p) 20-30%. Dx 50% ostial. LCx 50%. OM and PLB 50%. RCA 40% ISR. No intervention. EF 60%.    CARDIAC CATHETERIZATION N/A 7/19/2022    Procedure: Cardiac Coronary Angiogram;  Surgeon: Daniel Gomes MD;  Location:  CARDIAC CATH LAB;  Service: Cardiology    CARDIAC CATHETERIZATION  7/19/2022    Procedure: Cardiac catheterization;  Surgeon: Daniel Gomes MD;  Location:  CARDIAC CATH LAB;  Service: Cardiology    CARDIAC CATHETERIZATION N/A 7/19/2022    Procedure: Cardiac pci;  Surgeon: Daniel Gomes MD;  Location:  CARDIAC CATH LAB;  Service: Cardiology    CARDIAC SURGERY      HERNIA REPAIR      OR OPTX DSTL RADL I-ARTIC FX/EPIPHYSL SEP 2 FRAG Right 3/1/2021    Procedure: OPEN REDUCTION W/ INTERNAL FIXATION (ORIF) DISTAL RADIUS;  Surgeon: Cyrus Fishman MD;  Location: [facility];  Service: Orthopedics    ROTATOR CUFF REPAIR         Current Outpatient Medications:     albuterol (PROVENTIL HFA,VENTOLIN HFA) 90 mcg/act inhaler, Inhale 2 puffs every 4 (four) hours as needed for wheezing, Disp: 18 g, Rfl: 3     allopurinol (ZYLOPRIM) 300 mg tablet, TAKE (1) TABLET BY MOUTH ONCE DAILY., Disp: , Rfl:     amLODIPine (NORVASC) 2.5 mg tablet, Take 1 tablet (2.5 mg total) by mouth daily Take with 5 mg for total of 7.5 mg daily, Disp: 90 tablet, Rfl: 3    apixaban (ELIQUIS) 5 mg, Take 1 tablet (5 mg total) by mouth 2 (two) times a day, Disp: 180 tablet, Rfl: 3    clopidogrel (PLAVIX) 75 mg tablet, Take 1 tablet (75 mg total) by mouth daily, Disp: 90 tablet, Rfl: 3    co-enzyme Q-10 100 mg capsule, Take 100 mg by mouth daily , Disp: , Rfl:     doxazosin (CARDURA) 1 mg tablet, Take 1 tablet (1 mg total) by mouth daily, Disp: 90 tablet, Rfl: 3    ezetimibe (ZETIA) 10 mg tablet, Take 1 tablet (10 mg total) by mouth daily at bedtime, Disp: 90 tablet, Rfl: 3    fluticasone (FLONASE) 50 mcg/act nasal spray, 2 sprays into each nostril every 12 (twelve) hours as needed (sinus infections) , Disp: , Rfl:     lansoprazole (PREVACID) 30 mg capsule, TAKE 1 CAPSULE BY MOUTH ONCE DAILY., Disp: , Rfl:     metoprolol succinate (TOPROL-XL) 50 mg 24 hr tablet, Take 1 tablet (50 mg total) by mouth 2 (two) times a day, Disp: 60 tablet, Rfl: 11    nitroglycerin (NITROSTAT) 0.4 mg SL tablet, Place 1 tablet (0.4 mg total) under the tongue every 5 (five) minutes as needed for chest pain, Disp: 20 tablet, Rfl: 0    rosuvastatin (CRESTOR) 40 MG tablet, Take 1 tablet (40 mg total) by mouth daily, Disp: 90 tablet, Rfl: 3    torsemide (DEMADEX) 20 mg tablet, Take 2 tablets (40 mg total) by mouth daily, Disp: 180 tablet, Rfl: 3    levocetirizine (XYZAL) 5 MG tablet, Take 5 mg by mouth every evening (Patient not taking: Reported on 2/17/2025), Disp: , Rfl:     potassium chloride (Klor-Con M20) 20 mEq tablet, Take 1 tablet (20 mEq total) by mouth daily, Disp: 90 tablet, Rfl: 3  Allergies   Allergen Reactions    Ace Inhibitors Angioedema    Alprazolam Other (See Comments)     Other reaction(s): Other: Document details in comments  SEVERE  DISOREINTATION/CRAZY  SEVERE DISOREINTATION/CRAZY      Benazepril Other (See Comments)    Buspirone      Other reaction(s): Other (See Comments), Other: Document details in comments  SEVERE DISORIENTATION/CRAZY  SEVERE DISORIENTATION/CRAZY      Lorazepam      Other reaction(s): Other (See Comments), Other: Document details in comments  SEVERE DISORIENTATION/CRAZY  SEVERE DISORIENTATION/CRAZY         Labs:     Chemistry        Component Value Date/Time    K 3.9 11/01/2024 0810    CL 97 (L) 11/01/2024 0810    CO2 29 11/01/2024 0810    BUN 16 11/01/2024 0810    CREATININE 1.64 (H) 11/01/2024 0810        Component Value Date/Time    CALCIUM 9.2 11/01/2024 0810    ALKPHOS 140 (H) 11/01/2024 0810    AST 16 11/01/2024 0810    ALT 13 11/01/2024 0810        ECG 2/17/2025: Atrial fibrillation with  bpm. LAD. Low voltage QRS. Inferior infarct. Poor anterior R wave progression.    Lipid panel 7/1/2024: Direct LDL 70     24 hour Holter monitor 1/29/2024:  Predominantly sinus rhythm, HR , avg 59 bpm.  71 PVC's. 9483 PAC's(11.2%) with one 5-beat atrial run. No atrial fibrillation.  Longest R/R 2.4 seconds.     Echo 11/2/2023:  LVEF 60%, mild LVH. Grade 1 DD.  Mild fusion of left and noncoronary cusps of aortic valve.  Mild TR. PASP 34mmHg.     Lipid panel 10/27/2023: C 132. T 195. H 42. L 51.     ECG 6/29/2023: Sinus bradycardia with sinus arrythmia. Poor anterior R wave progression. Rate 58 bpm.     Lipid panel 6/27/2023: C 145. T 237. H 48. L 50.     ZIO 7/27-8/10/2022:  Predominantly sinus rhythm with slight P wave morphology changes noted.  HR , avg 53 bpm.  183 runs of SVT, longest 53.8 seconds with avg  bpm.  Wenckebach present during sleep.  1.1% PAC burden. Rare PVC's.     ECG 7/27/2022: Sinus bradycardia, inferior infarct, age undetermined. Rate 54 bpm.     Cardiac catheterization 7/19/2022:  Prox LAD to Mid LAD lesion is 80% stenosed. · 1st Mrg lesion is 70% stenosed. · Prox RCA to Mid RCA lesion  is 99% stenosed.  Multivessel CAD. A long 80% stenosis of the proximal and mid LAD was interrogated by iFR and was flow-limiting (iFR=0.82).  Successful IVUS-guided PCI of proximal and mid LAD performed. After shockwave lithotripsy and pre-dilation, a 3.0x38 RO was placed under GuiderLiner support. There was no residual stenosis. IVUS-guided PCI of in-stent restenosis of the mid RCA was then performed. A 2.5x38mm RO was placed under GuideLiner support. Because of a possible edge dissection, a 2.5x12mm RO was overlapped at the distal margin of the first stent. There was no residual stenosis. Disease of OM1 was not treated but could be addressed if symptoms recur.      Lexiscan nuclear stress test 6/23/2022:   Post-stress ejection fraction is 60 %. There is mild hypokinesis of the basal to mid inferior wall.  Perfusion: There is a medium-sized, moderate intensity perfusion defect in the basal to distal inferior wall, with partial reversibility in the distal inferior wall consistent with myocardial scar with significant tk-scar ischemia.     Echocardiogram 6/23/2022:  EF 60%. Trace MR. Trace TR.      ECG 5/6/2022: Sinus bradycardia with PAC's. Inferior infarct. Rate 51 bpm.     Lipid 4/1/2022: Triglycerides 268. Direct LDL 69.     Lipid Panel 2/9/2021: C 163. T 215. H 38. L 82.     Echocardiogram 9/18/2020:   EF 55-60%.  Upper normal wall thickness.  Mild diastolic dysfunction.  Mildly dilated left atrium.  Trace mitral regurgitation.  Trace to mild tricuspid regurgitation.  No significant change compared to prior study 05/11/2015.     Nuclear Lexiscan Stress Test 8/12/2019:   Normal study. EF 54%.     Echocardiogram(5/11/2015):  EF 55-60%.  Mild MR. Mild TR.     Holter Monitor - (7/6/2004) 27 VE beats. 1 idioventricular episode 2.4 seconds.  EKG - (4/15/2016) NSR.70 BPM. Inferior MI.  Nuclear Stress Test - (5/11/2015) Lexiscan. No scar. No ischemia. EF 57%.     Cardiac Procedures:     Cardiac Catheterization -  "(9/16/2005) LM ok. LAD 20-30% proximal stenosis. Dx 50% ostial. LCx 50% OM and PLB with 50%. RCA 40% ISR. No intervention. EF 60%.  Cardiac Catheterization - (6/4/2004) PTCA and Cypher stent placement to the Proximal RCA.  Percutaneous Coronary Intervention - (6/4/2004) Cypher stent to RCA.    Review of Systems   Constitutional: Positive for malaise/fatigue.   HENT: Negative.     Cardiovascular:  Positive for dyspnea on exertion and palpitations. Negative for chest pain, irregular heartbeat, near-syncope and orthopnea.   Respiratory:  Negative for cough and snoring.    Endocrine: Negative.    Skin: Negative.    Musculoskeletal: Negative.    Gastrointestinal: Negative.    Genitourinary: Negative.    Neurological:  Positive for dizziness.   Psychiatric/Behavioral: Negative.         Vitals:    02/17/25 1127   BP: 138/72   Pulse: 93   Temp: 97.6 °F (36.4 °C)   SpO2: 95%     Vitals:    02/17/25 1127   Weight: (!) 139 kg (307 lb)     Height: 6' 3\" (190.5 cm)   Body mass index is 38.37 kg/m².    Physical Exam  Vitals and nursing note reviewed.   Constitutional:       General: He is not in acute distress.     Appearance: He is well-developed. He is obese. He is not diaphoretic.   HENT:      Head: Normocephalic and atraumatic.   Neck:      Vascular: No JVD.   Cardiovascular:      Rate and Rhythm: Normal rate. Rhythm irregularly irregular.      Pulses: Intact distal pulses.      Heart sounds: Normal heart sounds, S1 normal and S2 normal. No murmur heard.     No friction rub. No gallop.      Comments: No edema  Pulmonary:      Effort: Pulmonary effort is normal. No respiratory distress.      Breath sounds: Examination of the right-lower field reveals wheezing. Wheezing present.   Abdominal:      General: There is no distension.      Palpations: Abdomen is soft.      Tenderness: There is no abdominal tenderness.   Skin:     General: Skin is warm and dry.      Findings: No rash.   Neurological:      Mental Status: He is alert " and oriented to person, place, and time.   Psychiatric:         Behavior: Behavior normal.

## 2025-02-17 ENCOUNTER — TELEPHONE (OUTPATIENT)
Age: 70
End: 2025-02-17

## 2025-02-17 ENCOUNTER — OFFICE VISIT (OUTPATIENT)
Dept: CARDIOLOGY CLINIC | Facility: CLINIC | Age: 70
End: 2025-02-17
Payer: COMMERCIAL

## 2025-02-17 ENCOUNTER — HOSPITAL ENCOUNTER (EMERGENCY)
Facility: HOSPITAL | Age: 70
Discharge: HOME | End: 2025-02-17
Attending: EMERGENCY MEDICINE
Payer: COMMERCIAL

## 2025-02-17 ENCOUNTER — RESULTS FOLLOW-UP (OUTPATIENT)
Dept: CARDIOLOGY CLINIC | Facility: CLINIC | Age: 70
End: 2025-02-17

## 2025-02-17 ENCOUNTER — APPOINTMENT (OUTPATIENT)
Dept: LAB | Facility: HOSPITAL | Age: 70
End: 2025-02-17
Payer: COMMERCIAL

## 2025-02-17 VITALS
TEMPERATURE: 97.9 F | WEIGHT: 304.01 LBS | HEIGHT: 75 IN | DIASTOLIC BLOOD PRESSURE: 65 MMHG | HEART RATE: 101 BPM | OXYGEN SATURATION: 92 % | SYSTOLIC BLOOD PRESSURE: 130 MMHG | RESPIRATION RATE: 20 BRPM | BODY MASS INDEX: 37.8 KG/M2

## 2025-02-17 VITALS
DIASTOLIC BLOOD PRESSURE: 72 MMHG | HEIGHT: 75 IN | SYSTOLIC BLOOD PRESSURE: 138 MMHG | HEART RATE: 93 BPM | BODY MASS INDEX: 38.17 KG/M2 | OXYGEN SATURATION: 95 % | TEMPERATURE: 97.6 F | WEIGHT: 307 LBS

## 2025-02-17 DIAGNOSIS — G47.33 OBSTRUCTIVE SLEEP APNEA SYNDROME: ICD-10-CM

## 2025-02-17 DIAGNOSIS — E87.6 HYPOKALEMIA: ICD-10-CM

## 2025-02-17 DIAGNOSIS — I25.10 CORONARY ARTERY DISEASE INVOLVING NATIVE CORONARY ARTERY OF NATIVE HEART WITHOUT ANGINA PECTORIS: ICD-10-CM

## 2025-02-17 DIAGNOSIS — E78.2 MIXED HYPERLIPIDEMIA: ICD-10-CM

## 2025-02-17 DIAGNOSIS — I48.0 PAROXYSMAL ATRIAL FIBRILLATION (HCC): Primary | ICD-10-CM

## 2025-02-17 DIAGNOSIS — E87.6 HYPOKALEMIA: Primary | ICD-10-CM

## 2025-02-17 DIAGNOSIS — R60.0 LOCALIZED EDEMA: ICD-10-CM

## 2025-02-17 DIAGNOSIS — E83.42 HYPOMAGNESEMIA: ICD-10-CM

## 2025-02-17 DIAGNOSIS — I10 PRIMARY HYPERTENSION: ICD-10-CM

## 2025-02-17 DIAGNOSIS — I48.0 PAROXYSMAL ATRIAL FIBRILLATION (HCC): ICD-10-CM

## 2025-02-17 DIAGNOSIS — E83.42 HYPOMAGNESEMIA: Primary | ICD-10-CM

## 2025-02-17 LAB
ANION GAP SERPL CALCULATED.3IONS-SCNC: 12 MMOL/L (ref 4–13)
BUN SERPL-MCNC: 19 MG/DL (ref 5–25)
CALCIUM SERPL-MCNC: 8.8 MG/DL (ref 8.4–10.2)
CHLORIDE SERPL-SCNC: 95 MMOL/L (ref 96–108)
CO2 SERPL-SCNC: 32 MMOL/L (ref 21–32)
CREAT SERPL-MCNC: 1.78 MG/DL (ref 0.6–1.3)
GFR SERPL CREATININE-BSD FRML MDRD: 38 ML/MIN/1.73SQ M
GLUCOSE P FAST SERPL-MCNC: 99 MG/DL (ref 65–99)
MAGNESIUM SERPL-MCNC: 0.9 MG/DL (ref 1.9–2.7)
MAGNESIUM SERPL-MCNC: 2.7 MG/DL (ref 1.9–2.7)
POTASSIUM SERPL-SCNC: 3.1 MMOL/L (ref 3.5–5.3)
SODIUM SERPL-SCNC: 139 MMOL/L (ref 135–147)

## 2025-02-17 PROCEDURE — 93005 ELECTROCARDIOGRAM TRACING: CPT

## 2025-02-17 PROCEDURE — 83735 ASSAY OF MAGNESIUM: CPT

## 2025-02-17 PROCEDURE — 83735 ASSAY OF MAGNESIUM: CPT | Performed by: EMERGENCY MEDICINE

## 2025-02-17 PROCEDURE — 96366 THER/PROPH/DIAG IV INF ADDON: CPT

## 2025-02-17 PROCEDURE — 96365 THER/PROPH/DIAG IV INF INIT: CPT

## 2025-02-17 PROCEDURE — 99284 EMERGENCY DEPT VISIT MOD MDM: CPT

## 2025-02-17 PROCEDURE — 93000 ELECTROCARDIOGRAM COMPLETE: CPT | Performed by: NURSE PRACTITIONER

## 2025-02-17 PROCEDURE — 99285 EMERGENCY DEPT VISIT HI MDM: CPT | Performed by: EMERGENCY MEDICINE

## 2025-02-17 PROCEDURE — 99214 OFFICE O/P EST MOD 30 MIN: CPT | Performed by: NURSE PRACTITIONER

## 2025-02-17 PROCEDURE — 36415 COLL VENOUS BLD VENIPUNCTURE: CPT

## 2025-02-17 PROCEDURE — 80048 BASIC METABOLIC PNL TOTAL CA: CPT

## 2025-02-17 RX ORDER — POTASSIUM CHLORIDE 1500 MG/1
40 TABLET, EXTENDED RELEASE ORAL ONCE
Status: COMPLETED | OUTPATIENT
Start: 2025-02-17 | End: 2025-02-17

## 2025-02-17 RX ORDER — NITROGLYCERIN 0.4 MG/1
0.4 TABLET SUBLINGUAL
Qty: 20 TABLET | Refills: 0 | Status: SHIPPED | OUTPATIENT
Start: 2025-02-17

## 2025-02-17 RX ORDER — POTASSIUM CHLORIDE 1500 MG/1
40 TABLET, EXTENDED RELEASE ORAL DAILY
Qty: 90 TABLET | Refills: 3 | Status: SHIPPED | OUTPATIENT
Start: 2025-02-17

## 2025-02-17 RX ORDER — TORSEMIDE 20 MG/1
20 TABLET ORAL DAILY
Qty: 180 TABLET | Refills: 3 | Status: SHIPPED | OUTPATIENT
Start: 2025-02-17

## 2025-02-17 RX ORDER — METOPROLOL SUCCINATE 50 MG/1
50 TABLET, EXTENDED RELEASE ORAL 2 TIMES DAILY
Qty: 60 TABLET | Refills: 11 | Status: SHIPPED | OUTPATIENT
Start: 2025-02-17

## 2025-02-17 RX ORDER — ROSUVASTATIN CALCIUM 40 MG/1
40 TABLET, COATED ORAL DAILY
Qty: 90 TABLET | Refills: 3 | Status: SHIPPED | OUTPATIENT
Start: 2025-02-17

## 2025-02-17 RX ORDER — MAGNESIUM SULFATE HEPTAHYDRATE 40 MG/ML
4 INJECTION, SOLUTION INTRAVENOUS ONCE
Status: COMPLETED | OUTPATIENT
Start: 2025-02-17 | End: 2025-02-17

## 2025-02-17 RX ORDER — MAGNESIUM OXIDE 400 MG/1
400 TABLET ORAL DAILY
Qty: 30 TABLET | Refills: 11 | Status: SHIPPED | OUTPATIENT
Start: 2025-02-17 | End: 2025-02-20 | Stop reason: SDUPTHER

## 2025-02-17 RX ADMIN — POTASSIUM CHLORIDE 40 MEQ: 1500 TABLET, EXTENDED RELEASE ORAL at 17:31

## 2025-02-17 RX ADMIN — MAGNESIUM SULFATE HEPTAHYDRATE 4 G: 40 INJECTION, SOLUTION INTRAVENOUS at 15:33

## 2025-02-17 NOTE — ASSESSMENT & PLAN NOTE
Patient is currently on rosuvastatin 40 mg daily, Zetia 10 mg daily.  Goal LDL < 70.  Lipid panel 10/27/2023: C 132. T 195. H 42. L 51.  Direct LDL 70 on 7/1/2024 labs.  Repatha was too expensive.

## 2025-02-17 NOTE — ED PROVIDER NOTES
Time reflects when diagnosis was documented in both MDM as applicable and the Disposition within this note       Time User Action Codes Description Comment    2/17/2025  6:13 PM Natalie Auguste [E87.6] Hypokalemia     2/17/2025  6:13 PM Natalie Auguste [E83.42] Hypomagnesemia           ED Disposition       ED Disposition   Discharge    Condition   Stable    Date/Time   Mon Feb 17, 2025  6:13 PM    Comment   Tomasz Samimakbrando discharge to home/self care.                   Assessment & Plan       Medical Decision Making  Patient presents for low magnesium and potassium.  Patient is afebrile with no infectious symptoms.  Considered PE but less likely as patient has no chest pain, shortness of breath, significant risk factors and is satting well, doubt ACS, no chest pain, no significant anemia on CBC, patient euvolemic on exam and does not appear dry so doubt orthostatic changes, auscultation of the heart and lungs within normal limits with known atrial fibrillation, repeat magnesium 2.7 after magnesium infusion, patient feeling much better and discharged home with instructions for follow-up and advised to return if any additional concerns, patient acknowledges understanding and agreement with this plan      Problems Addressed:  Hypokalemia: acute illness or injury  Hypomagnesemia: acute illness or injury    Amount and/or Complexity of Data Reviewed  Labs: ordered. Decision-making details documented in ED Course.  ECG/medicine tests: ordered and independent interpretation performed. Decision-making details documented in ED Course.    Risk  OTC drugs.  Prescription drug management.        ED Course as of 02/17/25 2326 Mon Feb 17, 2025   2324 Immediately after magnesium infusion patient had episode of hypotension, was reevaluated shortly after that and no further issues with blood pressure, was able to ambulate without difficulty, reports feeling much better and agreeable to discharge home       Medications    magnesium sulfate 4 g/100 mL IVPB (premix) 4 g (0 g Intravenous Stopped 2/17/25 1733)   potassium chloride (Klor-Con M20) CR tablet 40 mEq (40 mEq Oral Given 2/17/25 1731)       ED Risk Strat Scores                                                History of Present Illness       Chief Complaint   Patient presents with    Abnormal Lab     Pt presented to this ED with family stating seen by cardiology today, bw done today showed low K+ and Mg. Instructed to come to ED per further evaluation and tx. Pt c/o weakness and dizziness.        Past Medical History:   Diagnosis Date    Chronic kidney disease     Coronary artery disease     CPAP (continuous positive airway pressure) dependence     Intermittant    GERD (gastroesophageal reflux disease)     H/O heart artery stent     Heart attack (HCC)     Hyperlipidemia     Hypertension     MI (myocardial infarction) (HCC)     Myocardial infarction (HCC)     2004 x 1 stent    LUCIO (obstructive sleep apnea)     Tobacco use       Past Surgical History:   Procedure Laterality Date    BACK SURGERY  2012    CARDIAC CATHETERIZATION  06/04/2004    PTCA and Cypher stent placement to the RCA(p).     CARDIAC CATHETERIZATION  09/16/2005    LAD(p) 20-30%. Dx 50% ostial. LCx 50%. OM and PLB 50%. RCA 40% ISR. No intervention. EF 60%.    CARDIAC CATHETERIZATION N/A 7/19/2022    Procedure: Cardiac Coronary Angiogram;  Surgeon: Daniel Gomes MD;  Location: [facility];  Service: Cardiology    CARDIAC CATHETERIZATION  7/19/2022    Procedure: Cardiac catheterization;  Surgeon: Dainel Gomes MD;  Location: [facility];  Service: Cardiology    CARDIAC CATHETERIZATION N/A 7/19/2022    Procedure: Cardiac pci;  Surgeon: Daniel Gomes MD;  Location: [facility];  Service: Cardiology    CARDIAC SURGERY      HERNIA REPAIR      AL OPTX DSTL RADL I-ARTIC FX/EPIPHYSL SEP 2 FRAG Right 3/1/2021    Procedure: OPEN REDUCTION W/ INTERNAL FIXATION (ORIF) DISTAL RADIUS;  Surgeon: Cyrus NOYOLA  MD Sravanthi;  Location:  MAIN OR;  Service: Orthopedics    ROTATOR CUFF REPAIR        Family History   Problem Relation Age of Onset    Hypertension Mother     Arthritis Mother     Hypertension Father     Hyperlipidemia Father     Hypertension Sister     Hyperlipidemia Sister     Hyperlipidemia Brother     Diabetes Paternal Grandmother       Social History     Tobacco Use    Smoking status: Every Day     Current packs/day: 0.00     Types: Cigarettes     Last attempt to quit: 2022     Years since quittin.6    Smokeless tobacco: Never    Tobacco comments:     Occ Cigs   Vaping Use    Vaping status: Never Used   Substance Use Topics    Alcohol use: Not Currently    Drug use: Never      E-Cigarette/Vaping    E-Cigarette Use Never User       E-Cigarette/Vaping Substances    Nicotine No     THC No     CBD No     Flavoring No     Other No     Unknown No       I have reviewed and agree with the history as documented.     Patient is a 69-year-old male presenting to the emergency department on advice from cardiology office secondary to abnormal labs including magnesium and potassium, patient was seen in the office today, was basically asymptomatic, had outpatient labs done showing these lab values, recommended to come to the ED for IV magnesium replacement, patient denies any symptoms on my evaluation, no chest pain or shortness of breath, no palpitations, no racing heart, no nausea vomiting or diarrhea        Review of Systems   Constitutional: Negative.    HENT: Negative.     Eyes: Negative.    Respiratory: Negative.     Cardiovascular: Negative.    Gastrointestinal: Negative.    Endocrine: Negative.    Genitourinary: Negative.    Musculoskeletal: Negative.    Skin: Negative.    Allergic/Immunologic: Negative.    Neurological: Negative.    Hematological: Negative.    Psychiatric/Behavioral: Negative.             Objective       ED Triage Vitals [25 1435]   Temperature Pulse Blood Pressure Respirations SpO2  Patient Position - Orthostatic VS   97.9 °F (36.6 °C) (!) 112 155/99 21 97 % Lying      Temp src Heart Rate Source BP Location FiO2 (%) Pain Score    -- Monitor Left arm -- No Pain      Vitals      Date and Time Temp Pulse SpO2 Resp BP Pain Score FACES Pain Rating User   02/17/25 1700 -- 101 92 % 20 130/65 -- -- MB   02/17/25 1630 -- 96 94 % 22 126/64 -- -- MB   02/17/25 1600 -- 99 91 % 27 126/63 -- -- MB   02/17/25 1545 -- 96 93 % 14 125/70 -- -- MB   02/17/25 1530 -- 96 97 % 17 138/65 -- -- MB   02/17/25 1445 -- 104 95 % 22 137/82 -- -- MB   02/17/25 1435 97.9 °F (36.6 °C) 112 97 % 21 155/99 No Pain -- AC            Physical Exam  Constitutional:       Appearance: Normal appearance. He is well-developed.   HENT:      Head: Normocephalic and atraumatic.      Nose: Nose normal.      Mouth/Throat:      Mouth: Mucous membranes are moist.   Eyes:      Conjunctiva/sclera: Conjunctivae normal.      Pupils: Pupils are equal, round, and reactive to light.   Cardiovascular:      Rate and Rhythm: Tachycardia present. Rhythm irregular.   Pulmonary:      Effort: Pulmonary effort is normal.   Abdominal:      Palpations: Abdomen is soft.   Musculoskeletal:         General: Normal range of motion.      Cervical back: Normal range of motion and neck supple.   Skin:     General: Skin is warm and dry.   Neurological:      Mental Status: He is alert and oriented to person, place, and time.         Results Reviewed       Procedure Component Value Units Date/Time    Magnesium [[id number]]  (Normal) Collected: 02/17/25 1728    Lab Status: Final result Specimen: Blood from Arm, Left Updated: 02/17/25 1805     Magnesium 2.7 mg/dL             No orders to display       ECG 12 Lead Documentation Only    Date/Time: 2/17/2025 3:16 PM    Performed by: Natalie Auguste DO  Authorized by: Natalie Auguste DO    Indications / Diagnosis:  Electrolyte abnormality  ECG reviewed by me, the ED Provider: yes    Patient location:  ED  Previous ECG:      Comparison to cardiac monitor: Yes    Interpretation:     Interpretation: abnormal    Rate:     ECG rate:  113    ECG rate assessment: tachycardic    Rhythm:     Rhythm: atrial fibrillation    Ectopy:     Ectopy: none    QRS:     QRS axis:  Left    QRS intervals:  Normal  Conduction:     Conduction: normal    ST segments:     ST segments:  Normal  T waves:     T waves: normal        ED Medication and Procedure Management   Prior to Admission Medications   Prescriptions Last Dose Informant Patient Reported? Taking?   albuterol (PROVENTIL HFA,VENTOLIN HFA) 90 mcg/act inhaler   No No   Sig: Inhale 2 puffs every 4 (four) hours as needed for wheezing   allopurinol (ZYLOPRIM) 300 mg tablet   Yes No   Sig: TAKE (1) TABLET BY MOUTH ONCE DAILY.   amLODIPine (NORVASC) 2.5 mg tablet   No No   Sig: Take 1 tablet (2.5 mg total) by mouth daily Take with 5 mg for total of 7.5 mg daily   apixaban (ELIQUIS) 5 mg   No No   Sig: Take 1 tablet (5 mg total) by mouth 2 (two) times a day   clopidogrel (PLAVIX) 75 mg tablet   No No   Sig: Take 1 tablet (75 mg total) by mouth daily   co-enzyme Q-10 100 mg capsule   Yes No   Sig: Take 100 mg by mouth daily    doxazosin (CARDURA) 1 mg tablet   No No   Sig: Take 1 tablet (1 mg total) by mouth daily   ezetimibe (ZETIA) 10 mg tablet   No No   Sig: Take 1 tablet (10 mg total) by mouth daily at bedtime   fluticasone (FLONASE) 50 mcg/act nasal spray  Spouse/Significant Other Yes No   Si sprays into each nostril every 12 (twelve) hours as needed (sinus infections)    lansoprazole (PREVACID) 30 mg capsule   Yes No   Sig: TAKE 1 CAPSULE BY MOUTH ONCE DAILY.   levocetirizine (XYZAL) 5 MG tablet   Yes No   Sig: Take 5 mg by mouth every evening   Patient not taking: Reported on 2025   magnesium oxide (MAG-OX) 400 mg tablet   No No   Sig: Take 1 tablet (400 mg total) by mouth daily   metoprolol succinate (TOPROL-XL) 50 mg 24 hr tablet   No No   Sig: Take 1 tablet (50 mg total) by mouth 2 (two)  times a day   nitroglycerin (NITROSTAT) 0.4 mg SL tablet   No No   Sig: Place 1 tablet (0.4 mg total) under the tongue every 5 (five) minutes as needed for chest pain   potassium chloride (Klor-Con M20) 20 mEq tablet   No No   Sig: Take 2 tablets (40 mEq total) by mouth daily   rosuvastatin (CRESTOR) 40 MG tablet   No No   Sig: Take 1 tablet (40 mg total) by mouth daily   torsemide (DEMADEX) 20 mg tablet   No No   Sig: Take 1 tablet (20 mg total) by mouth daily      Facility-Administered Medications: None     Discharge Medication List as of 2/17/2025  6:13 PM        CONTINUE these medications which have NOT CHANGED    Details   albuterol (PROVENTIL HFA,VENTOLIN HFA) 90 mcg/act inhaler Inhale 2 puffs every 4 (four) hours as needed for wheezing, Starting Tue 9/24/2024, Normal      allopurinol (ZYLOPRIM) 300 mg tablet TAKE (1) TABLET BY MOUTH ONCE DAILY., Historical Med      amLODIPine (NORVASC) 2.5 mg tablet Take 1 tablet (2.5 mg total) by mouth daily Take with 5 mg for total of 7.5 mg daily, Starting Thu 5/16/2024, Normal      apixaban (ELIQUIS) 5 mg Take 1 tablet (5 mg total) by mouth 2 (two) times a day, Starting Thu 5/16/2024, Normal      clopidogrel (PLAVIX) 75 mg tablet Take 1 tablet (75 mg total) by mouth daily, Starting Thu 5/16/2024, Normal      co-enzyme Q-10 100 mg capsule Take 100 mg by mouth daily , Historical Med      doxazosin (CARDURA) 1 mg tablet Take 1 tablet (1 mg total) by mouth daily, Starting Thu 5/16/2024, Normal      ezetimibe (ZETIA) 10 mg tablet Take 1 tablet (10 mg total) by mouth daily at bedtime, Starting Thu 5/16/2024, Normal      fluticasone (FLONASE) 50 mcg/act nasal spray 2 sprays into each nostril every 12 (twelve) hours as needed (sinus infections) , Historical Med      lansoprazole (PREVACID) 30 mg capsule TAKE 1 CAPSULE BY MOUTH ONCE DAILY., Historical Med      levocetirizine (XYZAL) 5 MG tablet Take 5 mg by mouth every evening, Starting Thu 11/14/2024, Historical Med       magnesium oxide (MAG-OX) 400 mg tablet Take 1 tablet (400 mg total) by mouth daily, Starting Mon 2/17/2025, Normal      metoprolol succinate (TOPROL-XL) 50 mg 24 hr tablet Take 1 tablet (50 mg total) by mouth 2 (two) times a day, Starting Mon 2/17/2025, Normal      nitroglycerin (NITROSTAT) 0.4 mg SL tablet Place 1 tablet (0.4 mg total) under the tongue every 5 (five) minutes as needed for chest pain, Starting Mon 2/17/2025, Normal      potassium chloride (Klor-Con M20) 20 mEq tablet Take 2 tablets (40 mEq total) by mouth daily, Starting Mon 2/17/2025, Normal      rosuvastatin (CRESTOR) 40 MG tablet Take 1 tablet (40 mg total) by mouth daily, Starting Mon 2/17/2025, Normal      torsemide (DEMADEX) 20 mg tablet Take 1 tablet (20 mg total) by mouth daily, Starting Mon 2/17/2025, Fill Later           No discharge procedures on file.  ED SEPSIS DOCUMENTATION   Time reflects when diagnosis was documented in both MDM as applicable and the Disposition within this note       Time User Action Codes Description Comment    2/17/2025  6:13 PM Natalie Auguste [E87.6] Hypokalemia     2/17/2025  6:13 PM Natalie Auguste [E83.42] Hypomagnesemia                  Natalie Auguste DO  02/17/25 6420

## 2025-02-17 NOTE — Clinical Note
Terry Kemmerling was seen and treated in our emergency department on 2/17/2025.                Diagnosis:     Tomasz  may return to work on return date.    He may return on this date: 02/19/2025         If you have any questions or concerns, please don't hesitate to call.      Natalie Auguste, DO    ______________________________           _______________          _______________  Hospital Representative                              Date                                Time

## 2025-02-17 NOTE — TELEPHONE ENCOUNTER
Received call from Heidy at  Labs to report critical value for pt's magnesium level at 0.9.  Lab was collected today at 12:32pm.  Secure chat message sent to [facility].

## 2025-02-17 NOTE — PATIENT INSTRUCTIONS
You are back in the irregular heart rhythm atrial fibrillation.  Change metoprolol to metoprolol succinate to 50 mg twice daily.  Monitor heart rates and notify me if HR is staying > 100 bpm or < 50 bpm.  Your new Apple Watch should have EKG capability and you can do an EKG twice daily to track rhythm.  Cardioversion in 3 weeks. It is imperative that you do not miss any Elqiuis in the next 3 weeks. Nothing to eat after midnight the evening before. Take your medication though. You will need a .

## 2025-02-17 NOTE — ASSESSMENT & PLAN NOTE
Coronary Artery Disease:  A. Inferior MI 6/4/2004 with VF arrest.  B. PCI and stent to RCA 6/4/2004.  C. Cardiac catheterization 10/2005: LM ok. LAD 20-30% proximal stenosis. Dx 50% ostial. LCx 50% OM and PLB with 50%. RCA 40% ISR. No intervention. EF 60%.  D. Non ischemic Lexiscan stress test 8/12/2019. EF 54%.  E. Lexiscan stress test 6/23/2022 with inferior scar with large periscar ischemia.  F. Cardiac cath 7/19/22: 80% LAD(p-m) with significant iFR, 70% 1st OM, 99% RCA(p-m); shockwave/PCI/RO to LAD, PCI and RO x 2 to RCA.  - - - - - - - -  Echocardiogram 11/2023 with EF 60%.  No anginal complaints.  Continue Plavix 75 mg daily with Eliquis 5 mg BID. No aspirin to avoid triple therapy.  Continue rosuvastatin 40 mg daily.  Strongly urged smoking cessation.  Reviewed s/s to report.  Will monitor.

## 2025-02-17 NOTE — ASSESSMENT & PLAN NOTE
Blood pressure is acceptable.  Benazepril was stopped in the hospital due to angioedema.  Reports only taking 2.5 mg of amlodipine currently - will verify.  Continue doxazosin 1 mg daily and torsemide to 40 mg daily.  Will monitor with transition from metoprolol tartrate to metoprolol succinate 50 mg BID.  Reviewed importance of CPAP compliance, 2 g sodium diet, smoking cessation, and weight control.  He will monitor BP at home and report readings > 140/90.  Labs today

## 2025-02-17 NOTE — TELEPHONE ENCOUNTER
Spoke with pt. Labs returned with critically low magnesium.  I have asked him to come to our ER for IV magnesium.  Reduce torsemide from 40 mg to 20 mg daily. I added magnesium oxide 400 mg daily for outpatient and increase potassium to 40 mEQ daily.  BMP and mag before the end of the week.

## 2025-02-17 NOTE — ASSESSMENT & PLAN NOTE
A fib with RVR after coronary intervention.  Maintained on Eliquis 5 mg BID.  Presents today with 2 weeks of dizziness. ECG shows A fib with ventricular rate 104 bpm.  Continue Eliquis 5 mg BID (missed 1 evening dose this week)  Transition off metoprolol tartrate to metoprolol succinate 50 mg BID.  EKG in office within 7 days. He will monitor HR's with his Apple Watch in the mean time.  Plan cardioversion in 3 weeks. No med holds, take Eliquis and metoprolol the morning of with sips of water. Instructed he will need a .  Urged faithful CPAP use.  Labs today.  Echo prior to cardioversion.  We reviewed urgent s/s to report and when to seek immediate medical attention.

## 2025-02-18 ENCOUNTER — PATIENT MESSAGE (OUTPATIENT)
Dept: CARDIOLOGY CLINIC | Facility: CLINIC | Age: 70
End: 2025-02-18

## 2025-02-18 DIAGNOSIS — E87.6 HYPOKALEMIA: Primary | ICD-10-CM

## 2025-02-18 DIAGNOSIS — E83.42 HYPOMAGNESEMIA: ICD-10-CM

## 2025-02-18 LAB
QRS AXIS: -33 DEGREES
QRSD INTERVAL: 84 MS
QT INTERVAL: 352 MS
QTC INTERVAL: 482 MS
T WAVE AXIS: 99 DEGREES
VENTRICULAR RATE: 113 BPM

## 2025-02-19 NOTE — PATIENT COMMUNICATION
Spoke with pt and spouse. He ate chinese yesterday evening then was up vomiting during the night. I advised he needs to go to the ER but he says he feels good and declines. I spoke to his wife and told her if he has any worsening symptoms to have him taken to the ER. She agreed. Will come for labs tomorrow morning and I will watch for results.

## 2025-02-20 ENCOUNTER — RESULTS FOLLOW-UP (OUTPATIENT)
Dept: CARDIOLOGY CLINIC | Facility: CLINIC | Age: 70
End: 2025-02-20

## 2025-02-20 ENCOUNTER — APPOINTMENT (OUTPATIENT)
Dept: LAB | Facility: HOSPITAL | Age: 70
End: 2025-02-20
Payer: COMMERCIAL

## 2025-02-20 DIAGNOSIS — E87.6 HYPOKALEMIA: ICD-10-CM

## 2025-02-20 DIAGNOSIS — E83.42 HYPOMAGNESEMIA: ICD-10-CM

## 2025-02-20 LAB
ANION GAP SERPL CALCULATED.3IONS-SCNC: 11 MMOL/L (ref 4–13)
BUN SERPL-MCNC: 18 MG/DL (ref 5–25)
CALCIUM SERPL-MCNC: 9.3 MG/DL (ref 8.4–10.2)
CHLORIDE SERPL-SCNC: 98 MMOL/L (ref 96–108)
CO2 SERPL-SCNC: 29 MMOL/L (ref 21–32)
CREAT SERPL-MCNC: 1.77 MG/DL (ref 0.6–1.3)
GFR SERPL CREATININE-BSD FRML MDRD: 38 ML/MIN/1.73SQ M
GLUCOSE P FAST SERPL-MCNC: 85 MG/DL (ref 65–99)
MAGNESIUM SERPL-MCNC: 1.4 MG/DL (ref 1.9–2.7)
POTASSIUM SERPL-SCNC: 3.7 MMOL/L (ref 3.5–5.3)
SODIUM SERPL-SCNC: 138 MMOL/L (ref 135–147)

## 2025-02-20 PROCEDURE — 36415 COLL VENOUS BLD VENIPUNCTURE: CPT

## 2025-02-20 PROCEDURE — 83735 ASSAY OF MAGNESIUM: CPT

## 2025-02-20 PROCEDURE — 80048 BASIC METABOLIC PNL TOTAL CA: CPT

## 2025-02-20 RX ORDER — MAGNESIUM OXIDE 400 MG/1
400 TABLET ORAL 2 TIMES DAILY
Qty: 60 TABLET | Refills: 11 | Status: SHIPPED | OUTPATIENT
Start: 2025-02-20

## 2025-02-21 ENCOUNTER — TELEPHONE (OUTPATIENT)
Age: 70
End: 2025-02-21

## 2025-02-21 ENCOUNTER — TELEPHONE (OUTPATIENT)
Dept: NON INVASIVE DIAGNOSTICS | Facility: HOSPITAL | Age: 70
End: 2025-02-21

## 2025-02-21 NOTE — TELEPHONE ENCOUNTER
Called and left message that his cardioversion appointment on 3/12/25 must be rescheduled to Friday 3/14/25 at 8am. I asked him to return a phone call to verify he received this message.

## 2025-02-21 NOTE — TELEPHONE ENCOUNTER
Received call from patient's wife requesting to speak to Juanyguy Ochoa regarding her 's return to work date. He is currently scheduled to return this Monday, but she is worried he is not ready and would like to discuss his RTW date in more detail.

## 2025-02-21 NOTE — TELEPHONE ENCOUNTER
Spoke with Tomasz. He says he is feeling well. He is up and around with no issue. I advised he may return to work, but if he starts feeling poorly he needs to go to the hospital.  He is scheduled for labs and EKG Tuesday next week.

## 2025-02-25 ENCOUNTER — CLINICAL SUPPORT (OUTPATIENT)
Dept: CARDIOLOGY CLINIC | Facility: CLINIC | Age: 70
End: 2025-02-25
Payer: COMMERCIAL

## 2025-02-25 ENCOUNTER — TELEPHONE (OUTPATIENT)
Dept: CARDIOLOGY CLINIC | Facility: CLINIC | Age: 70
End: 2025-02-25

## 2025-02-25 ENCOUNTER — RESULTS FOLLOW-UP (OUTPATIENT)
Dept: CARDIOLOGY CLINIC | Facility: CLINIC | Age: 70
End: 2025-02-25

## 2025-02-25 ENCOUNTER — APPOINTMENT (OUTPATIENT)
Dept: LAB | Facility: HOSPITAL | Age: 70
End: 2025-02-25
Payer: COMMERCIAL

## 2025-02-25 VITALS
WEIGHT: 306 LBS | HEIGHT: 75 IN | DIASTOLIC BLOOD PRESSURE: 98 MMHG | HEART RATE: 72 BPM | SYSTOLIC BLOOD PRESSURE: 142 MMHG | OXYGEN SATURATION: 92 % | TEMPERATURE: 97.6 F | BODY MASS INDEX: 38.05 KG/M2

## 2025-02-25 DIAGNOSIS — I48.0 PAROXYSMAL ATRIAL FIBRILLATION (HCC): Primary | ICD-10-CM

## 2025-02-25 DIAGNOSIS — I48.91 ATRIAL FIBRILLATION, UNSPECIFIED TYPE (HCC): Primary | ICD-10-CM

## 2025-02-25 DIAGNOSIS — R60.0 LOCALIZED EDEMA: ICD-10-CM

## 2025-02-25 DIAGNOSIS — E83.42 HYPOMAGNESEMIA: ICD-10-CM

## 2025-02-25 LAB
ANION GAP SERPL CALCULATED.3IONS-SCNC: 10 MMOL/L (ref 4–13)
BUN SERPL-MCNC: 15 MG/DL (ref 5–25)
CALCIUM SERPL-MCNC: 9.2 MG/DL (ref 8.4–10.2)
CHLORIDE SERPL-SCNC: 96 MMOL/L (ref 96–108)
CO2 SERPL-SCNC: 29 MMOL/L (ref 21–32)
CREAT SERPL-MCNC: 1.69 MG/DL (ref 0.6–1.3)
GFR SERPL CREATININE-BSD FRML MDRD: 41 ML/MIN/1.73SQ M
GLUCOSE P FAST SERPL-MCNC: 84 MG/DL (ref 65–99)
MAGNESIUM SERPL-MCNC: 1.2 MG/DL (ref 1.9–2.7)
POTASSIUM SERPL-SCNC: 3.7 MMOL/L (ref 3.5–5.3)
SODIUM SERPL-SCNC: 135 MMOL/L (ref 135–147)

## 2025-02-25 PROCEDURE — 36415 COLL VENOUS BLD VENIPUNCTURE: CPT

## 2025-02-25 PROCEDURE — 99212 OFFICE O/P EST SF 10 MIN: CPT

## 2025-02-25 PROCEDURE — 83735 ASSAY OF MAGNESIUM: CPT

## 2025-02-25 PROCEDURE — 93000 ELECTROCARDIOGRAM COMPLETE: CPT

## 2025-02-25 PROCEDURE — 80048 BASIC METABOLIC PNL TOTAL CA: CPT

## 2025-02-25 RX ORDER — MAGNESIUM OXIDE 400 MG/1
800 TABLET ORAL 2 TIMES DAILY
Qty: 120 TABLET | Refills: 11 | Status: SHIPPED | OUTPATIENT
Start: 2025-02-25

## 2025-02-25 RX ORDER — TORSEMIDE 20 MG/1
10 TABLET ORAL DAILY
Qty: 180 TABLET | Refills: 3 | Status: SHIPPED | OUTPATIENT
Start: 2025-02-25

## 2025-02-25 NOTE — TELEPHONE ENCOUNTER
Discussed lab with pt. Increase magnesium to 800 mg twice daily. Reduce torsemide to 10 mg daily.  Labs in 1 week.

## 2025-02-26 ENCOUNTER — TELEPHONE (OUTPATIENT)
Age: 70
End: 2025-02-26

## 2025-02-26 NOTE — TELEPHONE ENCOUNTER
Pt spouse called stating he needs a RTW note for being out of work last week. He was out from the out from 17 th - 21st  due to Afib and returned on Monday the 24 th.   fax# [phone]    Please call spouse once letter is completed.

## 2025-02-26 NOTE — TELEPHONE ENCOUNTER
Letter created and routed to his portal. Also, ok to print for them to  if unable to print from the portal.  Thank you!

## 2025-02-26 NOTE — LETTER
February 26, 2025     Patient: Terry Kemmerling  YOB: 1955  Date of Visit: 2/26/2025      To Whom it May Concern:    Terry Kemmerling is under my professional care. Tomasz was seen in my office on 2/17/2025 and 2/25/2025. Please excuse from 2/17-2/23/2025. Tomasz may return to work on 2/24/2025 .    If you have any questions or concerns, please don't hesitate to call.         Sincerely,      Juany FUNK

## 2025-03-06 ENCOUNTER — HOSPITAL ENCOUNTER (OUTPATIENT)
Dept: NON INVASIVE DIAGNOSTICS | Facility: HOSPITAL | Age: 70
Discharge: HOME | End: 2025-03-06
Payer: COMMERCIAL

## 2025-03-06 ENCOUNTER — TELEPHONE (OUTPATIENT)
Dept: CARDIOLOGY CLINIC | Facility: CLINIC | Age: 70
End: 2025-03-06

## 2025-03-06 ENCOUNTER — APPOINTMENT (OUTPATIENT)
Dept: LAB | Facility: HOSPITAL | Age: 70
End: 2025-03-06
Payer: COMMERCIAL

## 2025-03-06 ENCOUNTER — RESULTS FOLLOW-UP (OUTPATIENT)
Dept: CARDIOLOGY CLINIC | Facility: CLINIC | Age: 70
End: 2025-03-06

## 2025-03-06 VITALS
HEIGHT: 75 IN | HEART RATE: 100 BPM | SYSTOLIC BLOOD PRESSURE: 142 MMHG | WEIGHT: 306 LBS | BODY MASS INDEX: 38.05 KG/M2 | DIASTOLIC BLOOD PRESSURE: 98 MMHG

## 2025-03-06 DIAGNOSIS — I48.0 PAROXYSMAL ATRIAL FIBRILLATION (HCC): ICD-10-CM

## 2025-03-06 DIAGNOSIS — R60.0 LOCALIZED EDEMA: ICD-10-CM

## 2025-03-06 DIAGNOSIS — E83.42 HYPOMAGNESEMIA: ICD-10-CM

## 2025-03-06 LAB
ANION GAP SERPL CALCULATED.3IONS-SCNC: 6 MMOL/L (ref 4–13)
AORTIC VALVE MEAN VELOCITY: 12.7 M/S
AV AREA BY CONTINUOUS VTI: 1.7 CM2
AV AREA PEAK VELOCITY: 1.7 CM2
AV LVOT MEAN GRADIENT: 2 MMHG
AV LVOT PEAK GRADIENT: 3 MMHG
AV MEAN PRESS GRAD SYS DOP V1V2: 7 MMHG
AV ORIFICE AREA US: 1.65 CM2
AV PEAK GRADIENT: 11 MMHG
AV VELOCITY RATIO: 0.48
AV VMAX SYS DOP: 1.64 M/S
BSA FOR ECHO PROCEDURE: 2.63 M2
BUN SERPL-MCNC: 12 MG/DL (ref 5–25)
CALCIUM SERPL-MCNC: 9.5 MG/DL (ref 8.4–10.2)
CHLORIDE SERPL-SCNC: 103 MMOL/L (ref 96–108)
CO2 SERPL-SCNC: 32 MMOL/L (ref 21–32)
CREAT SERPL-MCNC: 1.63 MG/DL (ref 0.6–1.3)
DOP CALC AO VTI: 30.91 CM
DOP CALC LVOT AREA: 3.46 CM2
DOP CALC LVOT CARDIAC INDEX: 1.74 L/MIN/M2
DOP CALC LVOT CARDIAC OUTPUT: 4.58 L/MIN
DOP CALC LVOT DIAMETER: 2.1 CM
DOP CALC LVOT PEAK VEL VTI: 14.75 CM
DOP CALC LVOT PEAK VEL: 0.81 M/S
DOP CALC LVOT STROKE INDEX: 19 ML/M2
DOP CALC LVOT STROKE VOLUME: 51.06
E WAVE DECELERATION TIME: 144 MS
E/A RATIO: 115
FRACTIONAL SHORTENING: 38 (ref 28–44)
GFR SERPL CREATININE-BSD FRML MDRD: 42 ML/MIN/1.73SQ M
GLUCOSE P FAST SERPL-MCNC: 69 MG/DL (ref 65–99)
INTERVENTRICULAR SEPTUM IN DIASTOLE (PARASTERNAL SHORT AXIS VIEW): 1.7 CM
INTERVENTRICULAR SEPTUM: 1.7 CM (ref 0.6–1.1)
LEFT ATRIUM SIZE: 4.6 CM
LEFT INTERNAL DIMENSION IN SYSTOLE: 3.4 CM (ref 2.1–4)
LEFT VENTRICULAR INTERNAL DIMENSION IN DIASTOLE: 5.5 CM (ref 3.5–6)
LEFT VENTRICULAR POSTERIOR WALL IN END DIASTOLE: 1.6 CM
LEFT VENTRICULAR STROKE VOLUME: 100 ML
LVSV (TEICH): 100 ML
MAGNESIUM SERPL-MCNC: 1.5 MG/DL (ref 1.9–2.7)
MV PEAK A VEL: 0.01 M/S
MV PEAK E VEL: 115 CM/S
MV STENOSIS PRESSURE HALF TIME: 42 MS
MV VALVE AREA P 1/2 METHOD: 5.24
POTASSIUM SERPL-SCNC: 4.4 MMOL/L (ref 3.5–5.3)
RA PRESSURE ESTIMATED: 3 MMHG
SL CV LV EF: 60
SL CV PED ECHO LEFT VENTRICLE DIASTOLIC VOLUME (MOD BIPLANE) 2D: 147 ML
SL CV PED ECHO LEFT VENTRICLE SYSTOLIC VOLUME (MOD BIPLANE) 2D: 48 ML
SODIUM SERPL-SCNC: 141 MMOL/L (ref 135–147)
TRICUSPID ANNULAR PLANE SYSTOLIC EXCURSION: 1.7 CM
TSH SERPL DL<=0.05 MIU/L-ACNC: 1.8 UIU/ML (ref 0.45–4.5)

## 2025-03-06 PROCEDURE — 80048 BASIC METABOLIC PNL TOTAL CA: CPT

## 2025-03-06 PROCEDURE — 36415 COLL VENOUS BLD VENIPUNCTURE: CPT

## 2025-03-06 PROCEDURE — 83735 ASSAY OF MAGNESIUM: CPT

## 2025-03-06 PROCEDURE — 93306 TTE W/DOPPLER COMPLETE: CPT

## 2025-03-06 PROCEDURE — 84443 ASSAY THYROID STIM HORMONE: CPT

## 2025-03-12 ENCOUNTER — TELEPHONE (OUTPATIENT)
Dept: NON INVASIVE DIAGNOSTICS | Facility: HOSPITAL | Age: 70
End: 2025-03-12

## 2025-03-12 NOTE — TELEPHONE ENCOUNTER
Pt's wife calls, following up to see if [place] papers were completed.  I advised her that they were and emailed on 3/6.  Pt's employer told her today they did not receive it.  Can it be remailed?  She's also going to call and see if they have a fax# to send it to.

## 2025-03-12 NOTE — TELEPHONE ENCOUNTER
E-mailed again.  Left message for Saadia Reyes to call me with alternative fax number if she does not receive because I got notification that it went through.

## 2025-03-12 NOTE — TELEPHONE ENCOUNTER
"Done on 3/6/2025. Please see \"media\" tab for documents saved in his chart. Please send to employer and let patient know we did send it on 3/6 and will send again.  Thank you!  "

## 2025-03-12 NOTE — TELEPHONE ENCOUNTER
Spoke with pt and reviewed the following instructions:  Arrival time 0700  NPO after MN Thursday into Friday  Will need a   Take eliquis and metoprolol in AM with sip of water before coming to hospital    Pt states verbal understanding of instructions

## 2025-03-14 ENCOUNTER — ANESTHESIA EVENT (OUTPATIENT)
Dept: NON INVASIVE DIAGNOSTICS | Facility: HOSPITAL | Age: 70
End: 2025-03-14
Payer: COMMERCIAL

## 2025-03-14 ENCOUNTER — ANESTHESIA (OUTPATIENT)
Dept: NON INVASIVE DIAGNOSTICS | Facility: HOSPITAL | Age: 70
End: 2025-03-14
Payer: COMMERCIAL

## 2025-03-14 ENCOUNTER — HOSPITAL ENCOUNTER (OUTPATIENT)
Dept: NON INVASIVE DIAGNOSTICS | Facility: HOSPITAL | Age: 70
Discharge: HOME | End: 2025-03-14
Payer: COMMERCIAL

## 2025-03-14 VITALS
SYSTOLIC BLOOD PRESSURE: 142 MMHG | DIASTOLIC BLOOD PRESSURE: 80 MMHG | HEART RATE: 61 BPM | OXYGEN SATURATION: 97 % | HEIGHT: 75 IN | RESPIRATION RATE: 14 BRPM | BODY MASS INDEX: 37.05 KG/M2 | WEIGHT: 298 LBS | TEMPERATURE: 97.2 F

## 2025-03-14 DIAGNOSIS — I48.0 PAROXYSMAL ATRIAL FIBRILLATION (HCC): ICD-10-CM

## 2025-03-14 LAB
ATRIAL RATE: 125 BPM
ATRIAL RATE: 62 BPM
ATRIAL RATE: 63 BPM
P AXIS: 39 DEGREES
P AXIS: 42 DEGREES
PR INTERVAL: 164 MS
PR INTERVAL: 172 MS
QRS AXIS: -18 DEGREES
QRS AXIS: -5 DEGREES
QRS AXIS: -9 DEGREES
QRSD INTERVAL: 78 MS
QRSD INTERVAL: 80 MS
QRSD INTERVAL: 84 MS
QT INTERVAL: 352 MS
QT INTERVAL: 428 MS
QT INTERVAL: 432 MS
QTC INTERVAL: 437 MS
QTC INTERVAL: 438 MS
QTC INTERVAL: 451 MS
T WAVE AXIS: 46 DEGREES
T WAVE AXIS: 61 DEGREES
T WAVE AXIS: 73 DEGREES
VENTRICULAR RATE: 62 BPM
VENTRICULAR RATE: 63 BPM
VENTRICULAR RATE: 99 BPM

## 2025-03-14 PROCEDURE — 93010 ELECTROCARDIOGRAM REPORT: CPT | Performed by: INTERNAL MEDICINE

## 2025-03-14 PROCEDURE — 92960 CARDIOVERSION ELECTRIC EXT: CPT

## 2025-03-14 PROCEDURE — 92960 CARDIOVERSION ELECTRIC EXT: CPT | Performed by: INTERNAL MEDICINE

## 2025-03-14 PROCEDURE — 93005 ELECTROCARDIOGRAM TRACING: CPT

## 2025-03-14 RX ORDER — LIDOCAINE HYDROCHLORIDE 10 MG/ML
INJECTION, SOLUTION EPIDURAL; INFILTRATION; INTRACAUDAL; PERINEURAL AS NEEDED
Status: DISCONTINUED | OUTPATIENT
Start: 2025-03-14 | End: 2025-03-14

## 2025-03-14 RX ORDER — SODIUM CHLORIDE, SODIUM LACTATE, POTASSIUM CHLORIDE, CALCIUM CHLORIDE 600; 310; 30; 20 MG/100ML; MG/100ML; MG/100ML; MG/100ML
INJECTION, SOLUTION INTRAVENOUS CONTINUOUS PRN
Status: DISCONTINUED | OUTPATIENT
Start: 2025-03-14 | End: 2025-03-14

## 2025-03-14 RX ORDER — PROPOFOL 10 MG/ML
INJECTION, EMULSION INTRAVENOUS AS NEEDED
Status: DISCONTINUED | OUTPATIENT
Start: 2025-03-14 | End: 2025-03-14

## 2025-03-14 RX ADMIN — PROPOFOL 30 MG: 10 INJECTION, EMULSION INTRAVENOUS at 08:28

## 2025-03-14 RX ADMIN — PROPOFOL 80 MG: 10 INJECTION, EMULSION INTRAVENOUS at 08:25

## 2025-03-14 RX ADMIN — PROPOFOL 20 MG: 10 INJECTION, EMULSION INTRAVENOUS at 08:26

## 2025-03-14 RX ADMIN — SODIUM CHLORIDE, SODIUM LACTATE, POTASSIUM CHLORIDE, AND CALCIUM CHLORIDE: .6; .31; .03; .02 INJECTION, SOLUTION INTRAVENOUS at 08:19

## 2025-03-14 RX ADMIN — LIDOCAINE HYDROCHLORIDE 50 MG: 10 INJECTION, SOLUTION EPIDURAL; INFILTRATION; INTRACAUDAL; PERINEURAL at 08:25

## 2025-03-14 NOTE — ANESTHESIA POSTPROCEDURE EVALUATION
Post-Op Assessment Note    CV Status:  Stable    Pain management: adequate       Mental Status:  Awake and alert   Hydration Status:  Stable   PONV Controlled:  None   Airway Patency:  Patent     Post Op Vitals Reviewed: Yes    No anethesia notable event occurred.    Staff: Anesthesiologist           Last Filed PACU Vitals:  Vitals Value Taken Time   Temp 97.2 °F (36.2 °C) 03/14/25 0854   Pulse 60 03/14/25 0854   /80 03/14/25 0854   Resp 16 03/14/25 0854   SpO2 98 % 03/14/25 0854       Modified Camilo:     Vitals Value Taken Time   Activity 2 03/14/25 0854   Respiration 2 03/14/25 0854   Circulation 2 03/14/25 0854   Consciousness 2 03/14/25 0854   Oxygen Saturation 2 03/14/25 0854     Modified Camilo Score: 10

## 2025-03-14 NOTE — DISCHARGE SUMMARY
Discharge Summary - Cardiology   Name: Terry Kemmerling 69 y.o. male I MRN: [id number]  Unit/Bed#:  I Date of Admission: 3/14/2025   Date of Service: 3/14/2025 I Hospital Day: 0    Patient presented with persistent atrial fibrillation with rapid ventricular rate.  He was scheduled for outpatient direct-current cardioversion.  Patient has been on uninterrupted oral anticoagulation for more than 4 weeks.  Cardioversion was initially carried out with 200 J without success followed by second shock of 300 J with successful restoration of normal sinus rhythm.  Patient tolerated the procedure well with no complications.

## 2025-03-14 NOTE — ANESTHESIA POSTPROCEDURE EVALUATION
Post-Op Assessment Note    CV Status:  Stable  Pain Score: 0    Pain management: adequate       Mental Status:  Alert and awake   Hydration Status:  Stable   PONV Controlled:  Controlled   Airway Patency:  Patent     Post Op Vitals Reviewed: Yes    No anethesia notable event occurred.    Staff: CRNA           Last Filed PACU Vitals:  Vitals Value Taken Time   Temp 97.2 °F (36.2 °C) 03/14/25 0838   Pulse 66 03/14/25 0839   /74 03/14/25 0838   Resp 22 03/14/25 0839   SpO2 96 % 03/14/25 0839   Vitals shown include unfiled device data.

## 2025-03-14 NOTE — INTERVAL H&P NOTE
Update: (This section must be completed if the H&P was completed greater than 24 hrs to procedure or admission)    H&P reviewed. After examining the patient, I find no changed to the H&P since it had been written.  Will proceed with DC cardioversion.    Patient re-evaluated. Accept as history and physical.    Boo Camarena MD/March 14, 2025/8:03 AM

## 2025-03-14 NOTE — ANESTHESIA PREPROCEDURE EVALUATION
"Procedure:  CARDIOVERSION    Relevant Problems   CARDIO   (+) Coronary artery disease involving native coronary artery   (+) Dyspnea on exertion   (+) HTN (hypertension)   (+) Hyperlipidemia   (+) Paroxysmal atrial fibrillation (HCC)      GI/HEPATIC   (+) GERD (gastroesophageal reflux disease)      /RENAL   (+) Chronic kidney disease (CKD), stage III (moderate) (HCC)      PULMONARY   (+) Dyspnea on exertion   (+) Sleep apnea      Behavioral Health   (+) Tobacco use      Surgery/Wound/Pain   (+) S/P angioplasty with stent      Other   (+) H/O acute myocardial infarction        Physical Exam    Airway    Mallampati score: III  TM Distance: >3 FB  Neck ROM: full     Dental    upper dentures and lower dentures,     Cardiovascular      Pulmonary   Breath sounds normal    Other Findings    Anesthesia Plan  ASA Score- 3     Anesthesia Type- IV sedation with anesthesia with ASA Monitors.         Additional Monitors:     Airway Plan:            Plan Factors-    Chart reviewed. EKG reviewed. Imaging results reviewed. Existing labs reviewed. Patient summary reviewed.    Patient is a current smoker.  Patient smoked on day of surgery.            Induction- intravenous.    Postoperative Plan-         Informed Consent- Anesthetic plan and risks discussed with patient.  I personally reviewed this patient with the CRNA. Discussed and agreed on the Anesthesia Plan with the CRNA..      NPO Status:  Vitals Value Taken Time   Date of last liquid 03/14/25 03/14/25 0708   Time of last liquid 0600 03/14/25 0708   Date of last solid 03/13/25 03/14/25 0708   Time of last solid 1800 03/14/25 0708       VITALS  /90   Pulse 101   Temp (!) 97.4 °F (36.3 °C) (Temporal)   Resp 18   Ht 6' 3\" (1.905 m)   Wt 135 kg (298 lb)   SpO2 98%   BMI 37.25 kg/m²  BP Readings from Last 3 Encounters:   03/14/25 136/90   03/06/25 142/98   02/25/25 142/98        LABS  No results for input(s): \"WBC\", \"HGB\", \"HCT\", \"PLT\" in the last 8784 hours.  No " "results for input(s): \"APTT\", \"INR\", \"PTT\" in the last 8784 hours. Results from Last 12 Months   Lab Units 03/06/25  1352 02/25/25  1404 02/17/25  1232 11/01/24  0810 07/01/24  0921   SODIUM mmol/L 141 135   < > 138 140   POTASSIUM mmol/L 4.4 3.7   < > 3.9 3.6   CHLORIDE mmol/L 103 96   < > 97* 99*   CO2 mmol/L 32 29   < > 29 30   ANION GAP mmol/L 6 10   < > 12* 11   BUN mg/dL 12 15   < > 16 19   CREATININE mg/dL 1.63* 1.69*   < > 1.64* 1.40*   CALCIUM mg/dL 9.5 9.2   < > 9.2 9.1   GLUCOSE RANDOM mg/dL  --   --   --  110* 103*   MAGNESIUM mg/dL 1.5* 1.2*   < >  --   --    HEMOGLOBIN A1C %  --   --   --   --  6.2*    < > = values in this interval not displayed.        ECG      ECHOCARDIOGRAPHY OR OTHER TESTING/IMAGING  Encounter Date: 02/17/25   ECG 12 lead   Result Value    Ventricular Rate 113    Atrial Rate     AK Interval     QRSD Interval 84    QT Interval 352    QTC Interval 482    P Axis     QRS Axis -33    T Wave Axis 99    Narrative    Atrial fibrillation with rapid ventricular response  Left axis deviation  Low voltage QRS  Inferior infarct (cited on or before 09-Jul-2020)  Cannot rule out Anteroseptal infarct , age undetermined  Abnormal ECG  Confirmed by Kevin Nuno ([id number]) on 2/18/2025 3:34:48 PM     No results found for this or any previous visit.   History    Coronary artery disease, S/P coronary angioplasty w/ stent, AFIB, obstructive sleep apnea, old MI, current smoker, hypertension, hyperlipidemia, Hx of cardiac catheterization.     Interpretation Summary       •  Technically difficult study  •  Left Ventricle: Left ventricular cavity size is normal. Wall thickness is moderately increased. The left ventricular ejection fraction is 60% by visual estimation. Systolic function is normal. Although no diagnostic regional wall motion abnormality was identified, this possibility cannot be completely excluded on the basis of this study.  •  Left Atrium: The atrium is mildly dilated.  •  Aortic Valve: The " leaflets are mildly calcified. The aortic valve has no significant stenosis. Aortic valve peak velocity is 1.64 m/s. AV mean gradient is 7 mmHg.  •  IVC/SVC: The right atrial pressure is estimated at 3.0 mmHg. The inferior vena cava is normal in size. Respirophasic changes were normal.     ------- ANESTHESIA RISK-BENEFIT DISCUSSION -------  BENEFITS OF A SPECIALIZED ANESTHESIA TEAM INCLUDE (NBK [id number], PMID 21290236):  (1) Reduce mortality and morbidity for major surgeries/procedures. (2) The team provides analgesia/sedation/amnesia/akinesia as safely as possible. (3) The team strives to reduce discomfort as safely as possible.    RISKS, AND PLANS TO MITIGATE RISKS, INCLUDE:    - Neurologic system: IntraOp awareness (Risk is ~1:1,000 - 1:14,000; PMID 32491517), Stroke (Risk ~<0.1-2% for most cases; PMID 49252239), nerve injury, vision loss, and POCD.     - Airway and Pulmonary system: Dental or mouth injury, throat pain, critical hypoxia, pneumothorax, prolonged intubation, post-op respiratory compromise.  Airway/Intubation risks and prior data:   Mask Ventilation: Mask ventilation not attempted (0); Brand: LMA; Size: 4; Insertion Attempts: 1; Placement Verify: Auscultation, End tidal CO2; Comments: HKD CRNA;    LUCIO  Major ARISCAT risk factors for pulmonary complications include: Other factors/Clinical gestalt: 1 pt, yielding a score of 0-1= Low risk, 1.6%.  - Cardiovascular system: Hypotension, arrhythmias, cardiac injury or arrest, blood clots, bleeding, infection, vascular injuries.  Deniz's RCRI score items: ICM, yielding an RCRI Score of 1= 0.6% risk of MACE  Are tk-op or intra-op beta blockers indicated? (PMID 07559089): yes, if patient has not already taken  - FEN/GI system: Aspiration risk (~0.5% per PMC 6228223) and PONV (10-80% per Apfel score) especially if the patient has not fasted.  ASA NPO guideline compliance?: Yes  - Medication risk assessment: Allergic reactions, excessive bleeding with  anticoagulant use, overdoses, drug-drug interactions, injury to a fetus or  in pregnant or breastfeeding patients, tk-procedural sedation including while driving/operating machinery.  Recent relevant medications: See MAR or Med Review  Personal or family history of anesthesia complications: no  Pregnancy Status: N/A  - Estimate mortality risks associated with anesthesia based on ASA-PS (PMID 50521088): ASA-PS III: 1:3,500

## 2025-04-03 ENCOUNTER — OFFICE VISIT (OUTPATIENT)
Dept: CARDIOLOGY CLINIC | Facility: CLINIC | Age: 70
End: 2025-04-03
Payer: COMMERCIAL

## 2025-04-03 VITALS
HEIGHT: 75 IN | TEMPERATURE: 97.1 F | HEART RATE: 54 BPM | SYSTOLIC BLOOD PRESSURE: 136 MMHG | WEIGHT: 306 LBS | BODY MASS INDEX: 38.05 KG/M2 | OXYGEN SATURATION: 95 % | DIASTOLIC BLOOD PRESSURE: 72 MMHG

## 2025-04-03 DIAGNOSIS — I10 PRIMARY HYPERTENSION: ICD-10-CM

## 2025-04-03 DIAGNOSIS — G47.33 OBSTRUCTIVE SLEEP APNEA SYNDROME: ICD-10-CM

## 2025-04-03 DIAGNOSIS — E78.2 MIXED HYPERLIPIDEMIA: ICD-10-CM

## 2025-04-03 DIAGNOSIS — I48.0 PAROXYSMAL ATRIAL FIBRILLATION (HCC): Primary | ICD-10-CM

## 2025-04-03 DIAGNOSIS — I25.10 CORONARY ARTERY DISEASE INVOLVING NATIVE CORONARY ARTERY OF NATIVE HEART WITHOUT ANGINA PECTORIS: ICD-10-CM

## 2025-04-03 PROCEDURE — 93000 ELECTROCARDIOGRAM COMPLETE: CPT | Performed by: NURSE PRACTITIONER

## 2025-04-03 PROCEDURE — 99214 OFFICE O/P EST MOD 30 MIN: CPT | Performed by: NURSE PRACTITIONER

## 2025-04-03 RX ORDER — METOPROLOL SUCCINATE 25 MG/1
25 TABLET, EXTENDED RELEASE ORAL 2 TIMES DAILY
Qty: 60 TABLET | Refills: 11 | Status: SHIPPED | OUTPATIENT
Start: 2025-04-03

## 2025-04-03 NOTE — ASSESSMENT & PLAN NOTE
A fib with RVR after coronary intervention.  Maintained on Eliquis 5 mg BID.  Found to be in rapid a fib at  where he reported dizziness 2/17/25  Transitioned off metoprolol tartrate to metoprolol succinate 50 mg BID.  S/P cardioversion 3/17/2025 which restored sinus rhythm.   Continue Eliquis 5 mg BID  Reduce metoprolol succinate to 25 mg BID due to symptomatic bradycardia.  He will monitor HR's on his smart watch.  Needs sleep medicine follow up to treat LUCIO.  We reviewed urgent s/s to report and when to seek immediate medical attention.

## 2025-04-03 NOTE — ASSESSMENT & PLAN NOTE
Blood pressure is acceptable.  Benazepril was stopped in the hospital due to angioedema.  Continue amlodipine 2.5 mg daily, doxazosin 1 mg daily and torsemide to 10 mg daily.  Continue metoprolol succinate but at 25 mg BID given bradycardia.  Reviewed importance of CPAP compliance, 2 g sodium diet, smoking cessation, and weight control.  He will monitor BP at home and report readings > 140/90.

## 2025-04-03 NOTE — PROGRESS NOTES
Cardiology Follow Up    Terry Kemmerling  1955  [id number]  [facility] [place] [address]  [address]  [facility] [address] [phone]    Tomasz presents for close follow up of PAF s/p cardioversion.    1. Paroxysmal atrial fibrillation (HCC)  Assessment & Plan:  A fib with RVR after coronary intervention.  Maintained on Eliquis 5 mg BID.  Found to be in rapid a fib at  where he reported dizziness 2/17/25  Transitioned off metoprolol tartrate to metoprolol succinate 50 mg BID.  S/P cardioversion 3/17/2025 which restored sinus rhythm.   Continue Eliquis 5 mg BID  Reduce metoprolol succinate to 25 mg BID due to symptomatic bradycardia.  He will monitor HR's on his smart watch.  Needs sleep medicine follow up to treat LUCIO.  We reviewed urgent s/s to report and when to seek immediate medical attention.  Orders:  -     POCT ECG  2. Coronary artery disease involving native coronary artery of native heart without angina pectoris  Assessment & Plan:  Coronary Artery Disease:  A. Inferior MI 6/4/2004 with VF arrest.  B. PCI and stent to RCA 6/4/2004.  C. Cardiac catheterization 10/2005: LM ok. LAD 20-30% proximal stenosis. Dx 50% ostial. LCx 50% OM and PLB with 50%. RCA 40% ISR. No intervention. EF 60%.  D. Non ischemic Lexiscan stress test 8/12/2019. EF 54%.  E. Lexiscan stress test 6/23/2022 with inferior scar with large periscar ischemia.  F. Cardiac cath 7/19/22: 80% LAD(p-m) with significant iFR, 70% 1st OM, 99% RCA(p-m); shockwave/PCI/RO to LAD, PCI and RO x 2 to RCA.  - - - - - - - -  Echocardiogram 11/2023 with EF 60%.  No anginal complaints.  Continue Plavix 75 mg daily with Eliquis 5 mg BID. No aspirin to avoid triple therapy.  Continue rosuvastatin 40 mg daily.  Strongly urged smoking cessation.  Reviewed s/s to report.  Will monitor.  Orders:  -     metoprolol succinate (TOPROL-XL) 25 mg 24 hr tablet; Take 1 tablet (25 mg total) by mouth 2  (two) times a day  3. Primary hypertension  Assessment & Plan:  Blood pressure is acceptable.  Benazepril was stopped in the hospital due to angioedema.  Continue amlodipine 2.5 mg daily, doxazosin 1 mg daily and torsemide to 10 mg daily.  Continue metoprolol succinate but at 25 mg BID given bradycardia.  Reviewed importance of CPAP compliance, 2 g sodium diet, smoking cessation, and weight control.  He will monitor BP at home and report readings > 140/90.  4. Mixed hyperlipidemia  Assessment & Plan:  Patient is currently on rosuvastatin 40 mg daily, Zetia 10 mg daily.  Goal LDL < 70.  Lipid panel 10/27/2023: C 132. T 195. H 42. L 51.  Direct LDL 70 on 7/1/2024 labs.  Repatha was too expensive.     5. Obstructive sleep apnea syndrome  Assessment & Plan:  We reviewed the correlation between untreated sleep apnea and atrial fibrillation.  Urged faithful CPAP use.  Referral to sleep medicine for additional treatment.  Orders:  -     Ambulatory Referral to Sleep Medicine; Future     HPI   Tomasz has a past medical history of CAD, paroxysmal atrial fibrillation on Eliquis, HTN, HLD, obesity, ongoing tobacco use. History of angioedema related to ACE inhibitor.     He sustained an MI with VF arrest 2004 at which time he underwent PCI of the right coronary artery.        He was admitted to [facility] 7/9/2020 with swelling of his face and tongue.  This occurred after eating dinner on 07/09/2020.  The patient was given Benadryl and steroids as well as epinephrine it was felt that the patient likely had developed angioedema from ACE-inhibitor.      He is a long time patient of Dr. Boyd and re-established with her on 8/12/2020   He had undergone a nuclear stress test 08/11/2019 which showed ejection fraction of 54% and was in normal limits.      Tomasz followed up 5/6/2022. He had gained 22 pounds since his visit the prior year. He had changed jobs and was much less active. He had not been compliant with his CPAP.  He continued to smoke, about 1/2 PPD. He denied chest pain but reported progressively worsening dyspnea on exertion along with leg swelling. Updated echocardiogram and nuclear stress testing were ordered.     Echocardiogram 6/23/22 showed preserved LVEF with no significant wall motion or valvular abnormalities. Lexiscan nuclear stress test 6/23/22 showed an inferior wall scar with significant tk-scar ischemia. Therefore he was referred to undergo cardiac catheterization with nephrology consultation prior given his renal function.     Tomasz underwent cardiac catheterization [facility] on 7/19/2022 which showed 80% stenosis of the proximal to mid LAD, iFR was significant. 70% stenosis to 1st marginal. 99% stenosis of proximal to mid RCA. He underwent shockwave to the proximal to mid LAD lesion followed by PCI with RO. PCI and RO x 2 placed to proximal to mid RCA lesion. He received 230 mL of IV contrast/ 61.4 minutes of fluro time (excess fluoro time was reviewed with patient by Dr. Gomes and he was counseled on observing for radiation burn). He did receive pre-hydration per ESTELLE protocol and aggressive fluid resuscitation 150 mL/hr overnight. Admitting creatinine/GFR 1.48/48, discharge creatinine/GFR 1.14/67. He required an IV nitro drip overnight due to significant hypertension. He did go into rapid atrial fibrillation following the intervention. He was started on Eliquis 5 mg BID and Plavix 75 mg daily (no aspirin as per Dr. Gomes).     He reached out 2/17/2025 for evaluation of dizziness, shortness of breath, and fatigue. Symptoms started about 2 weeks prior to his OV. He reports having a URI at the time of symptom onset. URI symptoms resolved but the dizziness, fatigue persist. He had drastically modified his diet and his weight is down 20 pounds compared to May on our scale. He denied any exertional discomfort. No syncope/near syncope. No edema or orthopnea. ECG showed atrial fibrillation with ventricular rate  104 bpm. He was transitioned from metoprolol tartrate to metoprolol succinate 50 mg BID and set up for cardioversion.    Echocardiogram 3/6/2025 showed preserved LVEF with no significant valvular abnormality.    He underwent successful cardioversion 3/14/2025 with restoration of sinus rhythm after shock at 300 J (initial shock of 200J was not successful).    4/3/2025: Tomasz presents for close follow up. ECG today shows SB at 50 bpm. He is wearing his smart watch which has been showing HR's 40-80's. He does have lightheadedness with position change and fatigue. In general though he feels remarkably better since being back in sinus rhythm. No chest pain, shortness of breath. His weight is stable. He continues to smoke but is motivated to quit. He has been unable to use his CPAP and has not seen sleep medicine in years.     Medical Problems       Problem List       Esophagitis    H/O acute myocardial infarction    Myocardial infarction (HCC)    GERD (gastroesophageal reflux disease)    Coronary artery disease involving native coronary artery    S/P angioplasty with stent    Paroxysmal atrial fibrillation ([facility])    HTN (hypertension)    Hyperlipidemia    Sleep apnea    Localized edema    Chronic kidney disease (CKD), stage III (moderate) ([facility])    Dyspnea on exertion    Tobacco use    Impaired fasting glucose    Bradycardia        Past Medical History:   Diagnosis Date    Chronic kidney disease     Coronary artery disease     CPAP (continuous positive airway pressure) dependence     Intermittant    GERD (gastroesophageal reflux disease)     H/O heart artery stent     Heart attack (HCC)     Hyperlipidemia     Hypertension     MI (myocardial infarction) (HCC)     Myocardial infarction (HCC)     2004 x 1 stent    LUCIO (obstructive sleep apnea)     Tobacco use      Social History     Socioeconomic History    Marital status: /Civil Union     Spouse name: Not on file    Number of children: Not on file    Years of  education: Not on file    Highest education level: Not on file   Occupational History    Not on file   Tobacco Use    Smoking status: Every Day     Current packs/day: 0.00     Types: Cigarettes     Last attempt to quit: 2022     Years since quittin.7    Smokeless tobacco: Never    Tobacco comments:     Occ Cigs   Vaping Use    Vaping status: Never Used   Substance and Sexual Activity    Alcohol use: Not Currently    Drug use: Never    Sexual activity: Not on file     Comment: Defer   Other Topics Concern    Not on file   Social History Narrative    Not on file     Social Drivers of Health     Financial Resource Strain: Low Risk  (2024)    Received from [facility]    Overall Financial Resource Strain (CARDIA)     Difficulty of Paying Living Expenses: Not hard at all   Food Insecurity: No Food Insecurity (2024)    Received from [facility]    Hunger Vital Sign     Worried About Running Out of Food in the Last Year: Never true     Ran Out of Food in the Last Year: Never true   Transportation Needs: No Transportation Needs (2024)    Received from [facility]    PRAPARE - Transportation     Lack of Transportation (Medical): No     Lack of Transportation (Non-Medical): No   Physical Activity: Not on file   Stress: No Stress Concern Present (2024)    Received from [facility]    [employer] [place] [employer] - Occupational Stress Questionnaire     Feeling of Stress : Not at all   Social Connections: Feeling Socially Integrated (2024)    Received from [facility]    OASIS : Social Isolation     How often do you feel lonely or isolated from those around you?: Never   Intimate Partner Violence: Not At Risk (2024)    [facility] from [facility], [facility]    Humiliation, Afraid, Rape, and Kick questionnaire     Fear of Current or Ex-Partner: No     Emotionally  Abused: No     Physically Abused: No     Sexually Abused: No   Housing Stability: Unknown (7/1/2024)    Received from [facility]    Housing Stability Vital Sign     Unable to Pay for Housing in the Last Year: No     Number of Times Moved in the Last Year: Not on file     Homeless in the Last Year: No      Family History   Problem Relation Age of Onset    Hypertension Mother     Arthritis Mother     Hypertension Father     Hyperlipidemia Father     Hypertension Sister     Hyperlipidemia Sister     Hyperlipidemia Brother     Diabetes Paternal Grandmother      Past Surgical History:   Procedure Laterality Date    BACK SURGERY  2012    CARDIAC CATHETERIZATION  06/04/2004    PTCA and Cypher stent placement to the RCA(p).     CARDIAC CATHETERIZATION  09/16/2005    LAD(p) 20-30%. Dx 50% ostial. LCx 50%. OM and PLB 50%. RCA 40% ISR. No intervention. EF 60%.    CARDIAC CATHETERIZATION N/A 7/19/2022    Procedure: Cardiac Coronary Angiogram;  Surgeon: Daniel Gomes MD;  Location: [facility];  Service: Cardiology    CARDIAC CATHETERIZATION  7/19/2022    Procedure: Cardiac catheterization;  Surgeon: Daniel Gomes MD;  Location: [facility];  Service: Cardiology    CARDIAC CATHETERIZATION N/A 7/19/2022    Procedure: Cardiac pci;  Surgeon: Daniel Gomes MD;  Location: [facility];  Service: Cardiology    CARDIAC SURGERY      HERNIA REPAIR      NC OPTX DSTL RADL I-ARTIC FX/EPIPHYSL SEP 2 FRAG Right 3/1/2021    Procedure: OPEN REDUCTION W/ INTERNAL FIXATION (ORIF) DISTAL RADIUS;  Surgeon: Cyrus Fishman MD;  Location: [facility];  Service: Orthopedics    ROTATOR CUFF REPAIR         Current Outpatient Medications:     albuterol (PROVENTIL HFA,VENTOLIN HFA) 90 mcg/act inhaler, Inhale 2 puffs every 4 (four) hours as needed for wheezing, Disp: 18 g, Rfl: 3    allopurinol (ZYLOPRIM) 300 mg tablet, TAKE (1) TABLET BY MOUTH ONCE DAILY., Disp: , Rfl:     amLODIPine (NORVASC) 2.5 mg tablet, Take 1  tablet (2.5 mg total) by mouth daily Take with 5 mg for total of 7.5 mg daily, Disp: 90 tablet, Rfl: 3    apixaban (ELIQUIS) 5 mg, Take 1 tablet (5 mg total) by mouth 2 (two) times a day, Disp: 180 tablet, Rfl: 3    clopidogrel (PLAVIX) 75 mg tablet, Take 1 tablet (75 mg total) by mouth daily, Disp: 90 tablet, Rfl: 3    co-enzyme Q-10 100 mg capsule, Take 100 mg by mouth daily , Disp: , Rfl:     doxazosin (CARDURA) 1 mg tablet, Take 1 tablet (1 mg total) by mouth daily, Disp: 90 tablet, Rfl: 3    ezetimibe (ZETIA) 10 mg tablet, Take 1 tablet (10 mg total) by mouth daily at bedtime, Disp: 90 tablet, Rfl: 3    fluticasone (FLONASE) 50 mcg/act nasal spray, 2 sprays into each nostril every 12 (twelve) hours as needed (sinus infections) , Disp: , Rfl:     lansoprazole (PREVACID) 30 mg capsule, TAKE 1 CAPSULE BY MOUTH ONCE DAILY., Disp: , Rfl:     magnesium oxide (MAG-OX) 400 mg tablet, Take 2 tablets (800 mg total) by mouth 2 (two) times a day, Disp: 120 tablet, Rfl: 11    metoprolol succinate (TOPROL-XL) 25 mg 24 hr tablet, Take 1 tablet (25 mg total) by mouth 2 (two) times a day, Disp: 60 tablet, Rfl: 11    nitroglycerin (NITROSTAT) 0.4 mg SL tablet, Place 1 tablet (0.4 mg total) under the tongue every 5 (five) minutes as needed for chest pain, Disp: 20 tablet, Rfl: 0    potassium chloride (Klor-Con M20) 20 mEq tablet, Take 2 tablets (40 mEq total) by mouth daily, Disp: 90 tablet, Rfl: 3    rosuvastatin (CRESTOR) 40 MG tablet, Take 1 tablet (40 mg total) by mouth daily, Disp: 90 tablet, Rfl: 3    torsemide (DEMADEX) 20 mg tablet, Take 0.5 tablets (10 mg total) by mouth daily, Disp: 180 tablet, Rfl: 3  Allergies   Allergen Reactions    Ace Inhibitors Angioedema    Alprazolam Other (See Comments)     Other reaction(s): Other: Document details in comments  SEVERE DISOREINTATION/CRAZY  SEVERE DISOREINTATION/CRAZY      Benazepril Other (See Comments)    Buspirone      Other reaction(s): Other (See Comments), Other: Document  details in comments  SEVERE DISORIENTATION/CRAZY  SEVERE DISORIENTATION/CRAZY      Lorazepam      Other reaction(s): Other (See Comments), Other: Document details in comments  SEVERE DISORIENTATION/CRAZY  SEVERE DISORIENTATION/CRAZY         Labs:     Chemistry        Component Value Date/Time    K 4.4 03/06/2025 1352    K 3.9 11/01/2024 0810     03/06/2025 1352    CL 97 (L) 11/01/2024 0810    CO2 32 03/06/2025 1352    CO2 29 11/01/2024 0810    BUN 12 03/06/2025 1352    BUN 16 11/01/2024 0810    CREATININE 1.63 (H) 03/06/2025 1352    CREATININE 1.64 (H) 11/01/2024 0810        Component Value Date/Time    CALCIUM 9.5 03/06/2025 1352    CALCIUM 9.2 11/01/2024 0810    ALKPHOS 140 (H) 11/01/2024 0810    AST 16 11/01/2024 0810    ALT 13 11/01/2024 0810        ECG 4/3/2025: Sinus bradycardia at 50 bpm. Low voltage QRS. Inferior infarct.     Echo complete 3/6/2025  LVEF 60%. Mod LVH. Mildly dilated LA.  Aortic sclerosis without stenosis.    ECG 2/17/2025: Atrial fibrillation with  bpm. LAD. Low voltage QRS. Inferior infarct. Poor anterior R wave progression.     Lipid panel 7/1/2024: Direct LDL 70     24 hour Holter monitor 1/29/2024:  Predominantly sinus rhythm, HR , avg 59 bpm.  71 PVC's. 9483 PAC's(11.2%) with one 5-beat atrial run. No atrial fibrillation.  Longest R/R 2.4 seconds.     Echo 11/2/2023:  LVEF 60%, mild LVH. Grade 1 DD.  Mild fusion of left and noncoronary cusps of aortic valve.  Mild TR. PASP 34mmHg.     Lipid panel 10/27/2023: C 132. T 195. H 42. L 51.     ECG 6/29/2023: Sinus bradycardia with sinus arrythmia. Poor anterior R wave progression. Rate 58 bpm.     Lipid panel 6/27/2023: C 145. T 237. H 48. L 50.     ZIO 7/27-8/10/2022:  Predominantly sinus rhythm with slight P wave morphology changes noted.  HR , avg 53 bpm.  183 runs of SVT, longest 53.8 seconds with avg  bpm.  Wenckebach present during sleep.  1.1% PAC burden. Rare PVC's.     ECG 7/27/2022: Sinus bradycardia,  inferior infarct, age undetermined. Rate 54 bpm.     Cardiac catheterization 7/19/2022:  Prox LAD to Mid LAD lesion is 80% stenosed. · 1st Mrg lesion is 70% stenosed. · Prox RCA to Mid RCA lesion is 99% stenosed.  Multivessel CAD. A long 80% stenosis of the proximal and mid LAD was interrogated by iFR and was flow-limiting (iFR=0.82).  Successful IVUS-guided PCI of proximal and mid LAD performed. After shockwave lithotripsy and pre-dilation, a 3.0x38 RO was placed under GuiderLiner support. There was no residual stenosis. IVUS-guided PCI of in-stent restenosis of the mid RCA was then performed. A 2.5x38mm RO was placed under GuideLiner support. Because of a possible edge dissection, a 2.5x12mm RO was overlapped at the distal margin of the first stent. There was no residual stenosis. Disease of OM1 was not treated but could be addressed if symptoms recur.      Lexiscan nuclear stress test 6/23/2022:   Post-stress ejection fraction is 60 %. There is mild hypokinesis of the basal to mid inferior wall.  Perfusion: There is a medium-sized, moderate intensity perfusion defect in the basal to distal inferior wall, with partial reversibility in the distal inferior wall consistent with myocardial scar with significant tk-scar ischemia.     Echocardiogram 6/23/2022:  EF 60%. Trace MR. Trace TR.      ECG 5/6/2022: Sinus bradycardia with PAC's. Inferior infarct. Rate 51 bpm.     Lipid 4/1/2022: Triglycerides 268. Direct LDL 69.     Lipid Panel 2/9/2021: C 163. T 215. H 38. L 82.     Echocardiogram 9/18/2020:   EF 55-60%.  Upper normal wall thickness.  Mild diastolic dysfunction.  Mildly dilated left atrium.  Trace mitral regurgitation.  Trace to mild tricuspid regurgitation.  No significant change compared to prior study 05/11/2015.     Nuclear Lexiscan Stress Test 8/12/2019:   Normal study. EF 54%.     Echocardiogram(5/11/2015):  EF 55-60%.  Mild MR. Mild TR.     Holter Monitor - (7/6/2004) 27 VE beats. 1 idioventricular  "episode 2.4 seconds.  EKG - (4/15/2016) NSR.70 BPM. Inferior MI.  Nuclear Stress Test - (5/11/2015) Lexiscan. No scar. No ischemia. EF 57%.     Cardiac Procedures:     Cardiac Catheterization - (9/16/2005) LM ok. LAD 20-30% proximal stenosis. Dx 50% ostial. LCx 50% OM and PLB with 50%. RCA 40% ISR. No intervention. EF 60%.  Cardiac Catheterization - (6/4/2004) PTCA and Cypher stent placement to the Proximal RCA.  Percutaneous Coronary Intervention - (6/4/2004) Cypher stent to RCA.    Review of Systems   Constitutional: Negative.   HENT: Negative.     Cardiovascular:  Positive for dyspnea on exertion (stable) and leg swelling (stable). Negative for chest pain, irregular heartbeat, near-syncope, orthopnea and palpitations.   Respiratory:  Negative for cough and snoring.    Endocrine: Negative.    Skin: Negative.    Musculoskeletal: Negative.    Gastrointestinal: Negative.    Genitourinary: Negative.    Neurological:  Positive for light-headedness.   Psychiatric/Behavioral: Negative.         Vitals:    04/03/25 1550   BP: 136/72   Pulse: (!) 54   Temp: (!) 97.1 °F (36.2 °C)   SpO2: 95%     Vitals:    04/03/25 1550   Weight: (!) 139 kg (306 lb)     Height: 6' 3\" (190.5 cm)   Body mass index is 38.25 kg/m².    Physical Exam  Vitals and nursing note reviewed.   Constitutional:       General: He is not in acute distress.     Appearance: He is well-developed. He is obese. He is not diaphoretic.   HENT:      Head: Normocephalic and atraumatic.   Neck:      Vascular: No carotid bruit or JVD.   Cardiovascular:      Rate and Rhythm: Regular rhythm. Bradycardia present.      Pulses: Intact distal pulses.      Heart sounds: Normal heart sounds, S1 normal and S2 normal. No murmur heard.     No friction rub. No gallop.      Comments: Minimal lower leg edema  Pulmonary:      Effort: Pulmonary effort is normal. No respiratory distress.      Breath sounds: Normal breath sounds.   Abdominal:      General: There is no distension.      " Palpations: Abdomen is soft.      Tenderness: There is no abdominal tenderness.   Skin:     General: Skin is warm and dry.      Findings: No rash.   Neurological:      Mental Status: He is alert and oriented to person, place, and time.   Psychiatric:         Behavior: Behavior normal.

## 2025-05-29 DIAGNOSIS — I48.0 PAROXYSMAL ATRIAL FIBRILLATION (HCC): ICD-10-CM

## 2025-05-29 RX ORDER — APIXABAN 5 MG/1
5 TABLET, FILM COATED ORAL 2 TIMES DAILY
Qty: 60 TABLET | Refills: 5 | Status: SHIPPED | OUTPATIENT
Start: 2025-05-29

## 2025-06-12 NOTE — ANESTHESIA POSTPROCEDURE EVALUATION
Post-Op Assessment Note    CV Status:  Stable  Pain Score: 9    Pain management: adequate     Mental Status:  Awake and alert   Hydration Status:  Stable   PONV Controlled:  None   Airway Patency:  Patent and adequate      Post Op Vitals Reviewed: Yes      Staff: CRNA, Anesthesiologist         No complications documented      BP   162/77   Temp   97   Pulse  61   Resp   18   SpO2   91% [Negative] : Endocrine

## 2025-06-18 DIAGNOSIS — I10 PRIMARY HYPERTENSION: ICD-10-CM

## 2025-06-18 RX ORDER — DOXAZOSIN 1 MG/1
1 TABLET ORAL DAILY
Qty: 90 TABLET | Refills: 1 | Status: SHIPPED | OUTPATIENT
Start: 2025-06-18

## 2025-06-30 DIAGNOSIS — I25.10 CORONARY ARTERY DISEASE INVOLVING NATIVE CORONARY ARTERY OF NATIVE HEART WITHOUT ANGINA PECTORIS: ICD-10-CM

## 2025-07-01 RX ORDER — CLOPIDOGREL BISULFATE 75 MG/1
75 TABLET ORAL DAILY
Qty: 90 TABLET | Refills: 0 | Status: SHIPPED | OUTPATIENT
Start: 2025-07-01

## 2025-07-01 NOTE — PROGRESS NOTES
Cardiology Follow Up    Terry Kemmerling  1955  [id number]  [facility] [place] [address] [address]  [address]LO[address]  [facility] [address] [phone]    Tomasz presents for follow up of CAD, PAF, HTN, HLD.     1. Coronary artery disease involving native coronary artery of native heart without angina pectoris  Assessment & Plan:  Coronary Artery Disease:  A. Inferior MI 6/4/2004 with VF arrest.  B. PCI and stent to RCA 6/4/2004.  C. Cardiac catheterization 10/2005: LM ok. LAD 20-30% proximal stenosis. Dx 50% ostial. LCx 50% OM and PLB with 50%. RCA 40% ISR. No intervention. EF 60%.  D. Non ischemic Lexiscan stress test 8/12/2019. EF 54%.  E. Lexiscan stress test 6/23/2022 with inferior scar with large periscar ischemia.  F. Cardiac cath 7/19/22: 80% LAD(p-m) with significant iFR, 70% 1st OM, 99% RCA(p-m); shockwave/PCI/RO to LAD, PCI and RO x 2 to RCA.  - - - - - - - -  Echocardiogram 11/2023 with EF 60%.  No anginal complaints.  Continue Plavix 75 mg daily with Eliquis 5 mg BID. No aspirin to avoid triple therapy.  Continue rosuvastatin 40 mg daily.  Strongly urged smoking cessation.  Reviewed s/s to report.  Will monitor.  Orders:  -     metoprolol succinate (TOPROL-XL) 25 mg 24 hr tablet; Take 3 tablets (75 mg total) by mouth in the morning and 3 tablets (75 mg total) before bedtime.  2. Paroxysmal atrial fibrillation (HCC)  Assessment & Plan:  A fib with RVR after coronary intervention.  Maintained on Eliquis 5 mg BID.  Found to be in rapid a fib at  where he reported dizziness 2/17/25  Transitioned off metoprolol tartrate to metoprolol succinate 50 mg BID.  S/P cardioversion 3/17/2025 which restored sinus rhythm.   Recurrent a fib (he believes Saturday of last week)  ECG today shows AF at 105 bpm.  Increase metoprolol succinate to 75 mg BID.  Discussed addition of AAD vs ablation. He is not interested in adding another pill. Requests consult for ablation.  Will refer to EP  Continue Eliquis 5 mg BID  Monitor HR's on smart watch. Monitor weight/swelling closely.  Needs sleep medicine follow up to treat LUCIO. Urged him to complete this.  We reviewed urgent s/s to report and when to seek immediate medical attention. Labs today with close follow up arranged.  Orders:  -     Magnesium; Future; Expected date: Collect anytime  -     Basic metabolic panel; Future; Expected date: 07/03/2025  -     POCT ECG  -     Ambulatory referral to Cardiac Electrophysiology; Future  3. Primary hypertension  Assessment & Plan:  Blood pressure is acceptable.  Benazepril was stopped in the hospital due to angioedema.  Continue amlodipine 2.5 mg daily, doxazosin 1 mg daily and torsemide to 10 mg daily.  Increase metoprolol succinate to 75 mg BID for HR control  Reviewed importance of CPAP compliance, 2 g sodium diet, smoking cessation, and weight control.  He will monitor BP at home and report readings > 140/90.  Close follow up arranged  Orders:  -     amLODIPine (NORVASC) 2.5 mg tablet; Take 1 tablet (2.5 mg total) by mouth daily Take with 5 mg for total of 7.5 mg daily  4. Mixed hyperlipidemia  Assessment & Plan:  Patient is currently on rosuvastatin 40 mg daily, Zetia 10 mg daily.  Goal LDL < 70.  Lipid panel 10/27/2023: C 132. T 195. H 42. L 51.  Direct LDL 70 on 7/1/2024 labs.  Repatha was too expensive.     Orders:  -     ezetimibe (ZETIA) 10 mg tablet; Take 1 tablet (10 mg total) by mouth daily at bedtime  5. Stage 3a chronic kidney disease (HCC)  Assessment & Plan:  Lab Results   Component Value Date    EGFR 42 03/06/2025    EGFR 41 02/25/2025    EGFR 38 02/20/2025    CREATININE 1.63 (H) 03/06/2025    CREATININE 1.69 (H) 02/25/2025    CREATININE 1.77 (H) 02/20/2025     Stable on recent blood work. Reviewed importance of avoiding nephrotoxic agents.  Recheck now  6. Obstructive sleep apnea syndrome  Assessment & Plan:  We reviewed the correlation between untreated sleep apnea and atrial  fibrillation.  Urged faithful CPAP use.  He was referred back to sleep medicine but did not yet schedule. I urged him to follow through with this.  7. Hypokalemia  Comments:  renewed potassium supplementation  Orders:  -     potassium chloride (Klor-Con M20) 20 mEq tablet; Take 2 tablets (40 mEq total) by mouth daily     HPI   Tomasz has a past medical history of CAD, paroxysmal atrial fibrillation on Eliquis, HTN, HLD, obesity, ongoing tobacco use. History of angioedema related to ACE inhibitor.     He sustained an MI with VF arrest 2004 at which time he underwent PCI of the right coronary artery.        He was [facility] to [facility]/9/2020 with swelling of his face and tongue.  This occurred after eating dinner on 07/09/2020.  The patient was given Benadryl and steroids as well as epinephrine it was felt that the patient likely had developed angioedema from ACE-inhibitor.      He is a long time patient of Dr. Boyd and re-established with her on 8/12/2020   He had undergone a nuclear stress test 08/11/2019 which showed ejection fraction of 54% and was in normal limits.       Echocardiogram 6/23/22 showed preserved LVEF with no significant wall motion or valvular abnormalities. Lexiscan nuclear stress test 6/23/22 showed an inferior wall scar with significant tk-scar ischemia. Therefore he was referred to undergo cardiac catheterization with nephrology consultation prior given his renal function.     Tomasz underwent cardiac catheterization at \[facility]"" on 7/19/2022 which showed 80% stenosis of the proximal to mid LAD, iFR was significant. 70% stenosis to 1st marginal. 99% stenosis of proximal to mid RCA. He underwent shockwave to the proximal to mid LAD lesion followed by PCI with RO. PCI and RO x 2 placed to proximal to mid RCA lesion. He received 230 mL of IV contrast/ 61.4 minutes of fluro time (excess fluoro time was reviewed with patient by Dr. Gomes and he was counseled on observing for radiation  burn). He did receive pre-hydration per ESTELLE protocol and aggressive fluid resuscitation 150 mL/hr overnight. Admitting creatinine/GFR 1.48/48, discharge creatinine/GFR 1.14/67. He required an IV nitro drip overnight due to significant hypertension. He did go into rapid atrial fibrillation following the intervention. He was started on Eliquis 5 mg BID and Plavix 75 mg daily (no aspirin as per Dr. Gomes).      He reached out 2/17/2025 for evaluation of dizziness, shortness of breath, and fatigue. Symptoms started about 2 weeks prior to his OV. He reports having a URI at the time of symptom onset. URI symptoms resolved but the dizziness, fatigue persist. He had drastically modified his diet and his weight is down 20 pounds compared to May on our scale. He denied any exertional discomfort. No syncope/near syncope. No edema or orthopnea. ECG showed atrial fibrillation with ventricular rate 104 bpm. He was transitioned from metoprolol tartrate to metoprolol succinate 50 mg BID and set up for cardioversion.     Echocardiogram 3/6/2025 showed preserved LVEF with no significant valvular abnormality.    He underwent successful cardioversion 3/14/2025 with restoration of sinus rhythm after shock at 300 J (initial shock of 200J was not successful).    He followed up with me on 4/3/2025 at which time ECG showed SB at 50 bpm. HR was between 40-80's on his smart watch. He noted positional lightheadedness and fatigue. Metoprolol succinate was reduced to 25 mg BID. He was referred to sleep medicine for management of LUCIO as he is unable to tolerate CPAP.     7/3/2025: Tomasz presents today accompanied by his wife for close follow up. He reports significant stress. His son, who is 46, just was hospitalized with a stroke. He has been smoking more due to the stress. He is avoiding alcohol. He noted irregular heart rates on his smart watch starting on Saturday. His wife increased his metoprolol back to 50 mg BID. ECG today shows AF at 105  bpm. He states he is not symptomatic currently. No chest discomfort, shortness of breath, lightheadedness, edema. He feels well. He states he has been taking his medications faithfully.    Medical Problems       Problem List       Esophagitis    H/O acute myocardial infarction    Myocardial infarction (HCC)    GERD (gastroesophageal reflux disease)    Coronary artery disease involving native coronary artery    S/P angioplasty with stent    Paroxysmal atrial fibrillation (HCC)    HTN (hypertension)    Hyperlipidemia    Sleep apnea    Localized edema    Chronic kidney disease (CKD), stage III (moderate) (HCC)    Dyspnea on exertion    Tobacco use    Impaired fasting glucose    Bradycardia        Past Medical History[1]  Social History     Socioeconomic History    Marital status: /Civil Union     Spouse name: Not on file    Number of children: Not on file    Years of education: Not on file    Highest education level: Not on file   Occupational History    Not on file   Tobacco Use    Smoking status: Some Days     Current packs/day: 0.00     Average packs/day: 0.3 packs/day for 5.0 years (1.3 ttl pk-yrs)     Types: Cigarettes     Last attempt to quit: 2022     Years since quittin.9    Smokeless tobacco: Never    Tobacco comments:     Occ Cigs   Vaping Use    Vaping status: Never Used   Substance and Sexual Activity    Alcohol use: Not Currently    Drug use: Never    Sexual activity: Not Currently     Comment: Defer   Other Topics Concern    Not on file   Social History Narrative    Not on file     Social Drivers of Health     Financial Resource Strain: Low Risk  (2024)    Received from [facility]    Overall Financial Resource Strain (CARDIA)     Difficulty of Paying Living Expenses: Not hard at all   Food Insecurity: No Food Insecurity (2024)    Received from [facility]    Hunger Vital Sign     Within the past 12 months, you worried that your food would run out  before you got the money to buy more.: Never true     Within the past 12 months, the food you bought just didn't last and you didn't have money to get more.: Never true   Transportation Needs: No Transportation Needs (7/1/2024)    Received from [facility]    PRAPARE - Transportation     Lack of Transportation (Medical): No     Lack of Transportation (Non-Medical): No   Physical Activity: Not on file   Stress: No Stress Concern Present (7/1/2024)    Received from [facility]    [employer] [place] [employer] - Occupational Stress Questionnaire     Feeling of Stress : Not at all   Social Connections: Feeling Socially Integrated (7/1/2024)    Received from [facility]    OASIS : Social Isolation     How often do you feel lonely or isolated from those around you?: Never   Intimate Partner Violence: Not At Risk (7/1/2024)    Received from [facility]    Humiliation, Afraid, Rape, and Kick questionnaire     Within the last year, have you been afraid of your partner or ex-partner?: No     Within the last year, have you been humiliated or emotionally abused in other ways by your partner or ex-partner?: No     Within the last year, have you been kicked, hit, slapped, or otherwise physically hurt by your partner or ex-partner?: No     Within the last year, have you been raped or forced to have any kind of sexual activity by your partner or ex-partner?: No   Housing Stability: Unknown (7/1/2024)    Received from [facility]    Housing Stability Vital Sign     In the last 12 months, was there a time when you were not able to pay the mortgage or rent on time?: No     Number of Times Moved in the Last Year: Not on file     At any time in the past 12 months, were you homeless or living in a shelter (including now)?: No      Family History[2]  Past Surgical History[3]  Current Medications[4]  Allergies   Allergen Reactions    Ace  Inhibitors Angioedema    Alprazolam Other (See Comments)     Other reaction(s): Other: Document details in comments  SEVERE DISOREINTATION/CRAZY  SEVERE DISOREINTATION/CRAZY      Benazepril Other (See Comments)    Buspirone      Other reaction(s): Other (See Comments), Other: Document details in comments  SEVERE DISORIENTATION/CRAZY  SEVERE DISORIENTATION/CRAZY      Lorazepam      Other reaction(s): Other (See Comments), Other: Document details in comments  SEVERE DISORIENTATION/CRAZY  SEVERE DISORIENTATION/CRAZY         Labs:     Chemistry        Component Value Date/Time    K 4.4 03/06/2025 1352    K 3.9 11/01/2024 0810     03/06/2025 1352    CL 97 (L) 11/01/2024 0810    CO2 32 03/06/2025 1352    CO2 29 11/01/2024 0810    BUN 12 03/06/2025 1352    BUN 16 11/01/2024 0810    CREATININE 1.63 (H) 03/06/2025 1352    CREATININE 1.64 (H) 11/01/2024 0810        Component Value Date/Time    CALCIUM 9.5 03/06/2025 1352    CALCIUM 9.2 11/01/2024 0810    ALKPHOS 140 (H) 11/01/2024 0810    AST 16 11/01/2024 0810    ALT 13 11/01/2024 0810        ECG 7/3/2025: Atrial fibrillation with ventricular rate 105 bpm. PVC vs aberrant beat x1. LAD. Inferior infarct.     ECG 4/3/2025: Sinus bradycardia at 50 bpm. Low voltage QRS. Inferior infarct.      Echo complete 3/6/2025  LVEF 60%. Mod LVH. Mildly dilated LA.  Aortic sclerosis without stenosis.     ECG 2/17/2025: Atrial fibrillation with  bpm. LAD. Low voltage QRS. Inferior infarct. Poor anterior R wave progression.     Lipid panel 7/1/2024: Direct LDL 70     24 hour Holter monitor 1/29/2024:  Predominantly sinus rhythm, HR , avg 59 bpm.  71 PVC's. 9483 PAC's(11.2%) with one 5-beat atrial run. No atrial fibrillation.  Longest R/R 2.4 seconds.     Echo 11/2/2023:  LVEF 60%, mild LVH. Grade 1 DD.  Mild fusion of left and noncoronary cusps of aortic valve.  Mild TR. PASP 34mmHg.     Lipid panel 10/27/2023: C 132. T 195. H 42. L 51.     ECG 6/29/2023: Sinus bradycardia  with sinus arrythmia. Poor anterior R wave progression. Rate 58 bpm.     Lipid panel 6/27/2023: C 145. T 237. H 48. L 50.     ZIO 7/27-8/10/2022:  Predominantly sinus rhythm with slight P wave morphology changes noted.  HR , avg 53 bpm.  183 runs of SVT, longest 53.8 seconds with avg  bpm.  Wenckebach present during sleep.  1.1% PAC burden. Rare PVC's.     ECG 7/27/2022: Sinus bradycardia, inferior infarct, age undetermined. Rate 54 bpm.     Cardiac catheterization 7/19/2022:  Prox LAD to Mid LAD lesion is 80% stenosed. · 1st Mrg lesion is 70% stenosed. · Prox RCA to Mid RCA lesion is 99% stenosed.  Multivessel CAD. A long 80% stenosis of the proximal and mid LAD was interrogated by iFR and was flow-limiting (iFR=0.82).  Successful IVUS-guided PCI of proximal and mid LAD performed. After shockwave lithotripsy and pre-dilation, a 3.0x38 RO was placed under GuiderLiner support. There was no residual stenosis. IVUS-guided PCI of in-stent restenosis of the mid RCA was then performed. A 2.5x38mm RO was placed under GuideLiner support. Because of a possible edge dissection, a 2.5x12mm RO was overlapped at the distal margin of the first stent. There was no residual stenosis. Disease of OM1 was not treated but could be addressed if symptoms recur.      Lexiscan nuclear stress test 6/23/2022:   Post-stress ejection fraction is 60 %. There is mild hypokinesis of the basal to mid inferior wall.  Perfusion: There is a medium-sized, moderate intensity perfusion defect in the basal to distal inferior wall, with partial reversibility in the distal inferior wall consistent with myocardial scar with significant tk-scar ischemia.     Echocardiogram 6/23/2022:  EF 60%. Trace MR. Trace TR.      ECG 5/6/2022: Sinus bradycardia with PAC's. Inferior infarct. Rate 51 bpm.     Lipid 4/1/2022: Triglycerides 268. Direct LDL 69.     Lipid Panel 2/9/2021: C 163. T 215. H 38. L 82.     Echocardiogram 9/18/2020:   EF  "55-60%.  Upper normal wall thickness.  Mild diastolic dysfunction.  Mildly dilated left atrium.  Trace mitral regurgitation.  Trace to mild tricuspid regurgitation.  No significant change compared to prior study 05/11/2015.     Nuclear Lexiscan Stress Test 8/12/2019:   Normal study. EF 54%.     Echocardiogram(5/11/2015):  EF 55-60%.  Mild MR. Mild TR.     Holter Monitor - (7/6/2004) 27 VE beats. 1 idioventricular episode 2.4 seconds.  EKG - (4/15/2016) NSR.70 BPM. Inferior MI.  Nuclear Stress Test - (5/11/2015) Lexiscan. No scar. No ischemia. EF 57%.     Cardiac Procedures:     Cardiac Catheterization - (9/16/2005) LM ok. LAD 20-30% proximal stenosis. Dx 50% ostial. LCx 50% OM and PLB with 50%. RCA 40% ISR. No intervention. EF 60%.  Cardiac Catheterization - (6/4/2004) PTCA and Cypher stent placement to the Proximal RCA.  Percutaneous Coronary Intervention - (6/4/2004) Cypher stent to RCA.    Review of Systems   Constitutional: Negative.   HENT: Negative.     Cardiovascular:  Positive for dyspnea on exertion. Negative for chest pain, irregular heartbeat, leg swelling, near-syncope, orthopnea and palpitations.   Respiratory:  Negative for cough and snoring.    Endocrine: Negative.    Skin: Negative.    Musculoskeletal: Negative.    Gastrointestinal: Negative.    Genitourinary: Negative.    Neurological: Negative.    Psychiatric/Behavioral: Negative.         Vitals:    07/03/25 1442   BP: 126/86   Pulse: (!) 124   Temp: (!) 97 °F (36.1 °C)   SpO2: 99%     Vitals:    07/03/25 1442   Weight: (!) 143 kg (316 lb 3.2 oz)     Height: 6' 3\" (190.5 cm)   Body mass index is 39.52 kg/m².    Physical Exam  Vitals and nursing note reviewed.   Constitutional:       General: He is not in acute distress.     Appearance: He is well-developed. He is obese. He is not diaphoretic.   HENT:      Head: Normocephalic and atraumatic.   Neck:      Vascular: No carotid bruit or JVD.     Cardiovascular:      Rate and Rhythm: Normal rate. Rhythm " irregular.      Pulses: Intact distal pulses.      Heart sounds: Normal heart sounds, S1 normal and S2 normal. No murmur heard.     No friction rub. No gallop.      Comments: Minimal lower leg edema  Pulmonary:      Effort: Pulmonary effort is normal. No respiratory distress.      Breath sounds: Wheezing present.   Abdominal:      General: There is no distension.      Palpations: Abdomen is soft.      Tenderness: There is no abdominal tenderness.     Skin:     General: Skin is warm and dry.      Findings: No rash.     Neurological:      Mental Status: He is alert and oriented to person, place, and time.     Psychiatric:         Behavior: Behavior normal.                     [1]   Past Medical History:  Diagnosis Date    Chronic kidney disease     Coronary artery disease     CPAP (continuous positive airway pressure) dependence     Intermittant    GERD (gastroesophageal reflux disease)     H/O heart artery stent     Heart attack (HCC)     Hyperlipidemia     Hypertension     MI (myocardial infarction) (HCC)     Myocardial infarction (HCC)     2004 x 1 stent    LUCIO (obstructive sleep apnea)     Tobacco use    [2]   Family History  Problem Relation Name Age of Onset    Hypertension Mother Carol     Arthritis Mother Carol     Hypertension Father Bryan     Hyperlipidemia Father Bryan     Heart attack Father Bryan     Heart disease Father Bryan     Hypertension Sister Kylah     Hyperlipidemia Sister Kylah     Hyperlipidemia Brother Bryan     Diabetes Paternal Grandmother      Stroke Son     [3]   Past Surgical History:  Procedure Laterality Date    BACK SURGERY  2012    CARDIAC CATHETERIZATION  06/04/2004    PTCA and Cypher stent placement to the RCA(p).     CARDIAC CATHETERIZATION  09/16/2005    LAD(p) 20-30%. Dx 50% ostial. LCx 50%. OM and PLB 50%. RCA 40% ISR. No intervention. EF 60%.    CARDIAC CATHETERIZATION N/A 7/19/2022    Procedure: Cardiac Coronary Angiogram;  Surgeon: Daniel Gomes MD;  Location: [facility];  Service: Cardiology    CARDIAC CATHETERIZATION  7/19/2022    Procedure: Cardiac catheterization;  Surgeon: Daniel Gomes MD;  Location:  CARDIAC CATH LAB;  Service: Cardiology    CARDIAC CATHETERIZATION N/A 7/19/2022    Procedure: Cardiac pci;  Surgeon: Daniel Gomes MD;  Location:  CARDIAC CATH LAB;  Service: Cardiology    CARDIAC SURGERY      HERNIA REPAIR      KY OPTX DSTL RADL I-ARTIC FX/EPIPHYSL SEP 2 FRAG Right 3/1/2021    Procedure: OPEN REDUCTION W/ INTERNAL FIXATION (ORIF) DISTAL RADIUS;  Surgeon: Cyrus Fishman MD;  Location:  MAIN OR;  Service: Orthopedics    ROTATOR CUFF REPAIR     [4]   Current Outpatient Medications:     albuterol (PROVENTIL HFA,VENTOLIN HFA) 90 mcg/act inhaler, Inhale 2 puffs every 4 (four) hours as needed for wheezing, Disp: 18 g, Rfl: 3    allopurinol (ZYLOPRIM) 300 mg tablet, , Disp: , Rfl:     amLODIPine (NORVASC) 2.5 mg tablet, Take 1 tablet (2.5 mg total) by mouth daily Take with 5 mg for total of 7.5 mg daily, Disp: 90 tablet, Rfl: 3    apixaban (Eliquis) 5 mg, TAKE 1 TABLET BY MOUTH TWICE DAILY., Disp: 60 tablet, Rfl: 5    clopidogrel (PLAVIX) 75 mg tablet, TAKE (1) TABLET BY MOUTH ONCE DAILY., Disp: 90 tablet, Rfl: 0    co-enzyme Q-10 100 mg capsule, Take 100 mg by mouth in the morning., Disp: , Rfl:     doxazosin (CARDURA) 1 mg tablet, TAKE (1) TABLET BY MOUTH ONCE DAILY., Disp: 90 tablet, Rfl: 1    ezetimibe (ZETIA) 10 mg tablet, Take 1 tablet (10 mg total) by mouth daily at bedtime, Disp: 90 tablet, Rfl: 3    fluticasone (FLONASE) 50 mcg/act nasal spray, 2 sprays into each nostril every 12 (twelve) hours as needed (sinus infections), Disp: , Rfl:     lansoprazole (PREVACID) 30 mg capsule, , Disp: , Rfl:     magnesium oxide (MAG-OX) 400 mg tablet, Take 2 tablets (800 mg total) by mouth 2 (two) times a day, Disp: 120 tablet, Rfl: 11    metoprolol succinate (TOPROL-XL) 25 mg 24 hr tablet, Take 3 tablets (75 mg total) by mouth in the morning and 3 tablets (75 mg  total) before bedtime., Disp: 180 tablet, Rfl: 11    nitroglycerin (NITROSTAT) 0.4 mg SL tablet, Place 1 tablet (0.4 mg total) under the tongue every 5 (five) minutes as needed for chest pain, Disp: 20 tablet, Rfl: 0    potassium chloride (Klor-Con M20) 20 mEq tablet, Take 2 tablets (40 mEq total) by mouth daily, Disp: 90 tablet, Rfl: 3    rosuvastatin (CRESTOR) 40 MG tablet, Take 1 tablet (40 mg total) by mouth daily, Disp: 90 tablet, Rfl: 3    torsemide (DEMADEX) 20 mg tablet, Take 0.5 tablets (10 mg total) by mouth daily, Disp: 180 tablet, Rfl: 3

## 2025-07-03 ENCOUNTER — OFFICE VISIT (OUTPATIENT)
Dept: CARDIOLOGY CLINIC | Facility: CLINIC | Age: 70
End: 2025-07-03
Payer: COMMERCIAL

## 2025-07-03 ENCOUNTER — APPOINTMENT (OUTPATIENT)
Dept: LAB | Facility: HOSPITAL | Age: 70
End: 2025-07-03
Payer: COMMERCIAL

## 2025-07-03 VITALS
HEIGHT: 75 IN | HEART RATE: 124 BPM | WEIGHT: 315 LBS | DIASTOLIC BLOOD PRESSURE: 86 MMHG | TEMPERATURE: 97 F | BODY MASS INDEX: 39.17 KG/M2 | OXYGEN SATURATION: 99 % | SYSTOLIC BLOOD PRESSURE: 126 MMHG

## 2025-07-03 DIAGNOSIS — E78.2 MIXED HYPERLIPIDEMIA: ICD-10-CM

## 2025-07-03 DIAGNOSIS — E87.6 HYPOKALEMIA: ICD-10-CM

## 2025-07-03 DIAGNOSIS — G47.33 OBSTRUCTIVE SLEEP APNEA SYNDROME: ICD-10-CM

## 2025-07-03 DIAGNOSIS — I10 PRIMARY HYPERTENSION: ICD-10-CM

## 2025-07-03 DIAGNOSIS — I25.10 CORONARY ARTERY DISEASE INVOLVING NATIVE CORONARY ARTERY OF NATIVE HEART WITHOUT ANGINA PECTORIS: Primary | ICD-10-CM

## 2025-07-03 DIAGNOSIS — I48.0 PAROXYSMAL ATRIAL FIBRILLATION (HCC): ICD-10-CM

## 2025-07-03 DIAGNOSIS — N18.31 STAGE 3A CHRONIC KIDNEY DISEASE (HCC): ICD-10-CM

## 2025-07-03 LAB
ANION GAP SERPL CALCULATED.3IONS-SCNC: 9 MMOL/L (ref 4–13)
BUN SERPL-MCNC: 17 MG/DL (ref 5–25)
CALCIUM SERPL-MCNC: 9.3 MG/DL (ref 8.4–10.2)
CHLORIDE SERPL-SCNC: 104 MMOL/L (ref 96–108)
CO2 SERPL-SCNC: 26 MMOL/L (ref 21–32)
CREAT SERPL-MCNC: 1.66 MG/DL (ref 0.6–1.3)
GFR SERPL CREATININE-BSD FRML MDRD: 41 ML/MIN/1.73SQ M
GLUCOSE SERPL-MCNC: 102 MG/DL (ref 65–140)
MAGNESIUM SERPL-MCNC: 1.4 MG/DL (ref 1.9–2.7)
POTASSIUM SERPL-SCNC: 3.7 MMOL/L (ref 3.5–5.3)
SODIUM SERPL-SCNC: 139 MMOL/L (ref 135–147)

## 2025-07-03 PROCEDURE — 36415 COLL VENOUS BLD VENIPUNCTURE: CPT

## 2025-07-03 PROCEDURE — 93000 ELECTROCARDIOGRAM COMPLETE: CPT | Performed by: NURSE PRACTITIONER

## 2025-07-03 PROCEDURE — 99214 OFFICE O/P EST MOD 30 MIN: CPT | Performed by: NURSE PRACTITIONER

## 2025-07-03 PROCEDURE — 80048 BASIC METABOLIC PNL TOTAL CA: CPT

## 2025-07-03 PROCEDURE — 83735 ASSAY OF MAGNESIUM: CPT

## 2025-07-03 RX ORDER — AMLODIPINE BESYLATE 2.5 MG/1
2.5 TABLET ORAL DAILY
Qty: 90 TABLET | Refills: 3 | Status: SHIPPED | OUTPATIENT
Start: 2025-07-03

## 2025-07-03 RX ORDER — EZETIMIBE 10 MG/1
10 TABLET ORAL
Qty: 90 TABLET | Refills: 3 | Status: SHIPPED | OUTPATIENT
Start: 2025-07-03

## 2025-07-03 RX ORDER — METOPROLOL SUCCINATE 25 MG/1
75 TABLET, EXTENDED RELEASE ORAL 2 TIMES DAILY
Qty: 180 TABLET | Refills: 11 | Status: SHIPPED | OUTPATIENT
Start: 2025-07-03

## 2025-07-03 RX ORDER — AMLODIPINE BESYLATE 2.5 MG/1
TABLET ORAL
Qty: 90 TABLET | Refills: 0 | OUTPATIENT
Start: 2025-07-03

## 2025-07-03 RX ORDER — EZETIMIBE 10 MG/1
10 TABLET ORAL
Qty: 90 TABLET | Refills: 0 | OUTPATIENT
Start: 2025-07-03

## 2025-07-03 RX ORDER — POTASSIUM CHLORIDE 1500 MG/1
40 TABLET, EXTENDED RELEASE ORAL DAILY
Qty: 90 TABLET | Refills: 3 | Status: SHIPPED | OUTPATIENT
Start: 2025-07-03

## 2025-07-03 NOTE — ASSESSMENT & PLAN NOTE
Blood pressure is acceptable.  Benazepril was stopped in the hospital due to angioedema.  Continue amlodipine 2.5 mg daily, doxazosin 1 mg daily and torsemide to 10 mg daily.  Increase metoprolol succinate to 75 mg BID for HR control  Reviewed importance of CPAP compliance, 2 g sodium diet, smoking cessation, and weight control.  He will monitor BP at home and report readings > 140/90.  Close follow up arranged

## 2025-07-03 NOTE — PATIENT INSTRUCTIONS
You are back in a fib.  Labs today. We may adjust your meds based on results.  Increase metoprolol to 75 mg twice daily.  I would really like you to schedule with sleep medicine because I am strongly suspicious sleep apnea is causing your a fib.   Avoid alcohol. Limit caffeine and try to cut down on smoking.  We are referring you to EP to discuss antiarrhythmics vs ablation

## 2025-07-03 NOTE — ASSESSMENT & PLAN NOTE
Lab Results   Component Value Date    EGFR 42 03/06/2025    EGFR 41 02/25/2025    EGFR 38 02/20/2025    CREATININE 1.63 (H) 03/06/2025    CREATININE 1.69 (H) 02/25/2025    CREATININE 1.77 (H) 02/20/2025     Stable on recent blood work. Reviewed importance of avoiding nephrotoxic agents.  Recheck now

## 2025-07-03 NOTE — ASSESSMENT & PLAN NOTE
We reviewed the correlation between untreated sleep apnea and atrial fibrillation.  Urged faithful CPAP use.  He was referred back to sleep medicine but did not yet schedule. I urged him to follow through with this.

## 2025-07-03 NOTE — ASSESSMENT & PLAN NOTE
A fib with RVR after coronary intervention.  Maintained on Eliquis 5 mg BID.  Found to be in rapid a fib at  where he reported dizziness 2/17/25  Transitioned off metoprolol tartrate to metoprolol succinate 50 mg BID.  S/P cardioversion 3/17/2025 which restored sinus rhythm.   Recurrent a fib (he believes Saturday of last week)  ECG today shows AF at 105 bpm.  Increase metoprolol succinate to 75 mg BID.  Discussed addition of AAD vs ablation. He is not interested in adding another pill. Requests consult for ablation. Will refer to EP  Continue Eliquis 5 mg BID  Monitor HR's on smart watch. Monitor weight/swelling closely.  Needs sleep medicine follow up to treat LUCIO. Urged him to complete this.  We reviewed urgent s/s to report and when to seek immediate medical attention. Labs today with close follow up arranged.

## 2025-07-07 ENCOUNTER — RESULTS FOLLOW-UP (OUTPATIENT)
Dept: CARDIOLOGY CLINIC | Facility: CLINIC | Age: 70
End: 2025-07-07

## 2025-07-07 ENCOUNTER — TELEPHONE (OUTPATIENT)
Dept: CARDIOLOGY CLINIC | Facility: CLINIC | Age: 70
End: 2025-07-07

## 2025-07-07 NOTE — TELEPHONE ENCOUNTER
----- Message from BLESSING Portillo sent at 7/7/2025  7:39 AM EDT -----  Please contact Tomasz and let him know his magnesium remains low. To confirm, he is taking the magnesium supplement 800 mg twice daily?    How was his heart rate with the increase in metoprolol over the weekend?  ----- Message -----  From: Lab, Background User  Sent: 7/3/2025   4:28 PM EDT  To: BLESSING Salinas

## 2025-07-07 NOTE — TELEPHONE ENCOUNTER
Hello! This patient has been referred to EP due to recurrent a fib, would like to be considered for an ablation.  Thank you!

## 2025-07-08 NOTE — TELEPHONE ENCOUNTER
Can you try to reach Tomasz again today? Would like an update on HR's and symptoms. OK to contact his wife if unable to reach him

## 2025-07-09 NOTE — TELEPHONE ENCOUNTER
Pt is taking the magnesium twice a day.  He did increase his metoprolol and his heart rate is running between 75-88.

## 2025-07-09 NOTE — TELEPHONE ENCOUNTER
Please have him increase magnesium to 3 times daily.  Also, I do not see that the follow up with me was scheduled. Please put him in an urgent slot within 2 weeks

## 2025-07-15 ENCOUNTER — TELEPHONE (OUTPATIENT)
Age: 70
End: 2025-07-15

## 2025-07-28 RX ORDER — METOPROLOL SUCCINATE 50 MG/1
50 TABLET, EXTENDED RELEASE ORAL DAILY
COMMUNITY
Start: 2025-06-18 | End: 2025-07-30 | Stop reason: SDUPTHER

## 2025-07-30 ENCOUNTER — OFFICE VISIT (OUTPATIENT)
Dept: CARDIOLOGY CLINIC | Facility: CLINIC | Age: 70
End: 2025-07-30
Payer: COMMERCIAL

## 2025-07-30 VITALS
SYSTOLIC BLOOD PRESSURE: 122 MMHG | BODY MASS INDEX: 39.17 KG/M2 | DIASTOLIC BLOOD PRESSURE: 78 MMHG | WEIGHT: 315 LBS | HEIGHT: 75 IN | HEART RATE: 83 BPM | OXYGEN SATURATION: 95 % | TEMPERATURE: 97.5 F

## 2025-07-30 DIAGNOSIS — R60.0 LOCALIZED EDEMA: ICD-10-CM

## 2025-07-30 DIAGNOSIS — N18.31 STAGE 3A CHRONIC KIDNEY DISEASE (HCC): ICD-10-CM

## 2025-07-30 DIAGNOSIS — I10 PRIMARY HYPERTENSION: ICD-10-CM

## 2025-07-30 DIAGNOSIS — I25.10 CORONARY ARTERY DISEASE INVOLVING NATIVE CORONARY ARTERY OF NATIVE HEART WITHOUT ANGINA PECTORIS: ICD-10-CM

## 2025-07-30 DIAGNOSIS — Z72.0 TOBACCO USE: ICD-10-CM

## 2025-07-30 DIAGNOSIS — I48.0 PAROXYSMAL ATRIAL FIBRILLATION (HCC): Primary | ICD-10-CM

## 2025-07-30 DIAGNOSIS — G47.33 OBSTRUCTIVE SLEEP APNEA SYNDROME: ICD-10-CM

## 2025-07-30 DIAGNOSIS — E78.2 MIXED HYPERLIPIDEMIA: ICD-10-CM

## 2025-07-30 PROCEDURE — 99214 OFFICE O/P EST MOD 30 MIN: CPT | Performed by: NURSE PRACTITIONER

## 2025-07-30 RX ORDER — METOPROLOL SUCCINATE 25 MG/1
25 TABLET, EXTENDED RELEASE ORAL 2 TIMES DAILY
Qty: 180 TABLET | Refills: 3 | Status: SHIPPED | OUTPATIENT
Start: 2025-07-30

## 2025-07-30 RX ORDER — METOPROLOL SUCCINATE 50 MG/1
50 TABLET, EXTENDED RELEASE ORAL 2 TIMES DAILY
Qty: 180 TABLET | Refills: 3 | Status: SHIPPED | OUTPATIENT
Start: 2025-07-30

## 2025-07-30 RX ORDER — TORSEMIDE 20 MG/1
20 TABLET ORAL DAILY
Qty: 90 TABLET | Refills: 3 | Status: SHIPPED | OUTPATIENT
Start: 2025-07-30

## 2025-08-04 DIAGNOSIS — E87.6 HYPOKALEMIA: ICD-10-CM

## 2025-08-06 RX ORDER — POTASSIUM CHLORIDE 1500 MG/1
40 TABLET, EXTENDED RELEASE ORAL DAILY
Qty: 90 TABLET | Refills: 1 | Status: SHIPPED | OUTPATIENT
Start: 2025-08-06

## (undated) DEVICE — PAD CAST 4 IN COTTON NON STERILE

## (undated) DEVICE — 1.6 K-WIRE, TROCAR, 150MM, 1/PKG
Type: IMPLANTABLE DEVICE | Site: WRIST | Status: NON-FUNCTIONAL
Brand: APTUS
Removed: 2021-03-01

## (undated) DEVICE — CATH GUIDE LAUNCHER 6FR JR4 110CM

## (undated) DEVICE — SUT ETHILON 3-0 PS-1 18 IN 1663G

## (undated) DEVICE — TIBURON HAND DRAPE: Brand: CONVERTORS

## (undated) DEVICE — CATH GUIDE LAUNCHER 6FR EBU 3.75

## (undated) DEVICE — ACE WRAP 4 IN UNSTERILE

## (undated) DEVICE — CATH BAL PTCA EMERGE MR 2 X 15MM

## (undated) DEVICE — PADDING CAST 4 IN  COTTON STRL

## (undated) DEVICE — DGW .035 FC J3MM 260CM TEF: Brand: EMERALD

## (undated) DEVICE — CURITY NON-ADHERENT STRIPS: Brand: CURITY

## (undated) DEVICE — BUCKET PLASTER DISPOSABLE

## (undated) DEVICE — STERILE POLYISOPRENE POWDER-FREE SURGICAL GLOVES WITH EMOLLIENT COATING: Brand: PROTEXIS

## (undated) DEVICE — Device: Brand: OMNIWIRE PRESSURE GUIDE WIRE

## (undated) DEVICE — GUIDEWIRE WHOLEY HI TORQUE INTERM MOD J .035 145CM

## (undated) DEVICE — BALLOON NC EMERGE 2.5 X 20MM

## (undated) DEVICE — CATH DIAG 5FR IMPULSE 100CM AR1

## (undated) DEVICE — Device

## (undated) DEVICE — GUIDELINER CATHETERS ARE INTENDED TO BE USED IN CONJUNCTION WITH GUIDE CATHETERS TO ACCESS DISCRETE REGIONS OF THE CORONARY AND/OR PERIPHERAL VASCULATURE, AND TO FACILITATE PLACEMENT OF INTERVENTIONAL DEVICES.: Brand: GUIDELINER® V3 CATHETER

## (undated) DEVICE — INTENDED FOR TISSUE SEPARATION, AND OTHER PROCEDURES THAT REQUIRE A SHARP SURGICAL BLADE TO PUNCTURE OR CUT.: Brand: BARD-PARKER ® SAFETYLOCK CARBON RIB-BACK BLADES

## (undated) DEVICE — STERILE POLYISOPRENE POWDER-FREE SURGICAL GLOVES: Brand: PROTEXIS

## (undated) DEVICE — STANDARD SURGICAL GOWN, L: Brand: CONVERTORS

## (undated) DEVICE — CATH IVUS EAGLE EYE PLATINUM 5FR 150CM

## (undated) DEVICE — RADIFOCUS OPTITORQUE ANGIOGRAPHIC CATHETER: Brand: OPTITORQUE

## (undated) DEVICE — CUFF TOURNIQUET 18 X 4 IN QUICK CONNECT DISP 1 BLADDER

## (undated) DEVICE — 2.5 CORTICAL SCREW 32MM, HD7, 5/PKG
Type: IMPLANTABLE DEVICE | Site: WRIST | Status: NON-FUNCTIONAL
Brand: APTUS
Removed: 2021-03-01

## (undated) DEVICE — CATH SHOCKWAVE C2 3 X 12MM

## (undated) DEVICE — PINNACLE INTRODUCER SHEATH: Brand: PINNACLE

## (undated) DEVICE — SCD SEQUENTIAL COMPRESSION COMFORT SLEEVE MEDIUM KNEE LENGTH: Brand: KENDALL SCD

## (undated) DEVICE — GAUZE SPONGES,16 PLY: Brand: CURITY

## (undated) DEVICE — CATH DIAG 5FR IMPULSE 100CM WR

## (undated) DEVICE — DRAPE SHEET THREE QUARTER

## (undated) DEVICE — RUNTHROUGH NS EXTRA FLOPPY PTCA GUIDEWIRE: Brand: RUNTHROUGH

## (undated) DEVICE — GLOVE SRG LF STRL BGL SKNSNS 6.5 PF

## (undated) DEVICE — CAST PADDING 4 IN SYNTHETIC STRL

## (undated) DEVICE — CHLORAPREP HI-LITE 26ML ORANGE

## (undated) DEVICE — DRAPE C-ARM X-RAY

## (undated) DEVICE — HI-TORQUE WHISPER MS GUIDE WIRE .014 STRAIGHT TIP 3.0 CM X 190 CM: Brand: HI-TORQUE WHISPER

## (undated) DEVICE — BETHLEHEM UNIVERSAL  MIONR EXT: Brand: CARDINAL HEALTH

## (undated) DEVICE — TR BAND RADIAL ARTERY COMPRESSION DEVICE: Brand: TR BAND

## (undated) DEVICE — 2.5 CORTICAL SCREW 20MM, HD7, 5/PKG
Type: IMPLANTABLE DEVICE | Site: WRIST | Status: FUNCTIONAL
Brand: APTUS
Removed: 2021-03-01

## (undated) DEVICE — THE TRAPLINER CATHETER IS INTENDED FOR USE IN CONJUNCTION WITH GUIDE CATHETERS TO ACCESS DISCRETE REGIONS OF THE CORONARY AND/OR PERIPHERAL VASCULATURE, TO FACILITATE PLACEMENT OF INTERVENTIONAL DEVICE WHILE MAINTAINING THE POSITION OF A GUIDEWIRE WITHIN THE VASCULATURE.: Brand: TRAPLINER™ CATHETER

## (undated) DEVICE — SUT MONOCRYL 3-0 PS-2 27 IN Y427H

## (undated) DEVICE — SPLINT PLASTER 4 IN X 15 IN

## (undated) DEVICE — NC TREK CORONARY DILATATION CATHETER 3.0 MM X 20 MM / RAPID-EXCHANGE: Brand: NC TREK

## (undated) DEVICE — TWIST DRILL Ø2.0MMX40MM,L91MM,SCALED,AO: Brand: APTUS

## (undated) DEVICE — GLIDESHEATH BASIC HYDROPHILIC COATED INTRODUCER SHEATH: Brand: GLIDESHEATH

## (undated) DEVICE — DGW .035 FC J3MM 150CM TEF: Brand: EMERALD

## (undated) DEVICE — CATH DIAG 5FR IMPULSE 100CM FR4